# Patient Record
Sex: MALE | Race: OTHER | NOT HISPANIC OR LATINO | Employment: OTHER | URBAN - METROPOLITAN AREA
[De-identification: names, ages, dates, MRNs, and addresses within clinical notes are randomized per-mention and may not be internally consistent; named-entity substitution may affect disease eponyms.]

---

## 2021-03-24 ENCOUNTER — IMMUNIZATION (OUTPATIENT)
Dept: INTERNAL MEDICINE | Facility: CLINIC | Age: 61
End: 2021-03-24
Payer: COMMERCIAL

## 2021-03-24 DIAGNOSIS — Z23 NEED FOR VACCINATION: Primary | ICD-10-CM

## 2021-03-24 PROCEDURE — 0011A COVID-19, MRNA, LNP-S, PF, 100 MCG/0.5 ML DOSE VACCINE: CPT | Mod: PBBFAC | Performed by: INTERNAL MEDICINE

## 2021-04-21 ENCOUNTER — IMMUNIZATION (OUTPATIENT)
Dept: INTERNAL MEDICINE | Facility: CLINIC | Age: 61
End: 2021-04-21
Payer: COMMERCIAL

## 2021-04-21 DIAGNOSIS — Z23 NEED FOR VACCINATION: Primary | ICD-10-CM

## 2021-04-21 PROCEDURE — 0012A COVID-19, MRNA, LNP-S, PF, 100 MCG/0.5 ML DOSE VACCINE: CPT | Mod: PBBFAC | Performed by: INTERNAL MEDICINE

## 2021-08-26 ENCOUNTER — HOSPITAL ENCOUNTER (OUTPATIENT)
Dept: RADIOLOGY | Facility: HOSPITAL | Age: 61
Discharge: HOME | End: 2021-08-26
Attending: STUDENT IN AN ORGANIZED HEALTH CARE EDUCATION/TRAINING PROGRAM
Payer: COMMERCIAL

## 2021-08-26 ENCOUNTER — OFFICE VISIT (OUTPATIENT)
Dept: UROLOGY | Facility: CLINIC | Age: 61
End: 2021-08-26
Payer: COMMERCIAL

## 2021-08-26 VITALS
HEART RATE: 54 BPM | DIASTOLIC BLOOD PRESSURE: 82 MMHG | WEIGHT: 176.69 LBS | BODY MASS INDEX: 24.74 KG/M2 | HEIGHT: 71 IN | SYSTOLIC BLOOD PRESSURE: 134 MMHG

## 2021-08-26 DIAGNOSIS — N13.30 HYDRONEPHROSIS, UNSPECIFIED HYDRONEPHROSIS TYPE: ICD-10-CM

## 2021-08-26 DIAGNOSIS — N20.0 NEPHROLITHIASIS: ICD-10-CM

## 2021-08-26 DIAGNOSIS — N20.0 NEPHROLITHIASIS: Primary | ICD-10-CM

## 2021-08-26 LAB
BILIRUB SERPL-MCNC: ABNORMAL MG/DL
BLOOD URINE, POC: 250
CLARITY, POC UA: CLEAR
COLOR, POC UA: YELLOW
GLUCOSE UR QL STRIP: ABNORMAL
KETONES UR QL STRIP: ABNORMAL
LEUKOCYTE ESTERASE URINE, POC: ABNORMAL
NITRITE, POC UA: ABNORMAL
PH, POC UA: 6
POC RESIDUAL URINE VOLUME: 55 ML (ref 0–100)
PROTEIN, POC: ABNORMAL
SPECIFIC GRAVITY, POC UA: 1.01
UROBILINOGEN, POC UA: ABNORMAL

## 2021-08-26 PROCEDURE — 3075F PR MOST RECENT SYSTOLIC BLOOD PRESS GE 130-139MM HG: ICD-10-PCS | Mod: CPTII,S$GLB,, | Performed by: STUDENT IN AN ORGANIZED HEALTH CARE EDUCATION/TRAINING PROGRAM

## 2021-08-26 PROCEDURE — 1159F MED LIST DOCD IN RCRD: CPT | Mod: CPTII,S$GLB,, | Performed by: STUDENT IN AN ORGANIZED HEALTH CARE EDUCATION/TRAINING PROGRAM

## 2021-08-26 PROCEDURE — 87086 URINE CULTURE/COLONY COUNT: CPT | Performed by: STUDENT IN AN ORGANIZED HEALTH CARE EDUCATION/TRAINING PROGRAM

## 2021-08-26 PROCEDURE — 51798 US URINE CAPACITY MEASURE: CPT | Mod: S$GLB,,, | Performed by: STUDENT IN AN ORGANIZED HEALTH CARE EDUCATION/TRAINING PROGRAM

## 2021-08-26 PROCEDURE — 74018 RADEX ABDOMEN 1 VIEW: CPT | Mod: TC,FY

## 2021-08-26 PROCEDURE — 1160F RVW MEDS BY RX/DR IN RCRD: CPT | Mod: CPTII,S$GLB,, | Performed by: STUDENT IN AN ORGANIZED HEALTH CARE EDUCATION/TRAINING PROGRAM

## 2021-08-26 PROCEDURE — 99999 PR PBB SHADOW E&M-EST. PATIENT-LVL III: ICD-10-PCS | Mod: PBBFAC,,, | Performed by: STUDENT IN AN ORGANIZED HEALTH CARE EDUCATION/TRAINING PROGRAM

## 2021-08-26 PROCEDURE — 1126F AMNT PAIN NOTED NONE PRSNT: CPT | Mod: CPTII,S$GLB,, | Performed by: STUDENT IN AN ORGANIZED HEALTH CARE EDUCATION/TRAINING PROGRAM

## 2021-08-26 PROCEDURE — 51798 POCT BLADDER SCAN: ICD-10-PCS | Mod: S$GLB,,, | Performed by: STUDENT IN AN ORGANIZED HEALTH CARE EDUCATION/TRAINING PROGRAM

## 2021-08-26 PROCEDURE — 81002 URINALYSIS NONAUTO W/O SCOPE: CPT | Mod: S$GLB,,, | Performed by: STUDENT IN AN ORGANIZED HEALTH CARE EDUCATION/TRAINING PROGRAM

## 2021-08-26 PROCEDURE — 3075F SYST BP GE 130 - 139MM HG: CPT | Mod: CPTII,S$GLB,, | Performed by: STUDENT IN AN ORGANIZED HEALTH CARE EDUCATION/TRAINING PROGRAM

## 2021-08-26 PROCEDURE — 99999 PR PBB SHADOW E&M-EST. PATIENT-LVL III: CPT | Mod: PBBFAC,,, | Performed by: STUDENT IN AN ORGANIZED HEALTH CARE EDUCATION/TRAINING PROGRAM

## 2021-08-26 PROCEDURE — 74018 RADEX ABDOMEN 1 VIEW: CPT | Mod: 26,,, | Performed by: RADIOLOGY

## 2021-08-26 PROCEDURE — 1159F PR MEDICATION LIST DOCUMENTED IN MEDICAL RECORD: ICD-10-PCS | Mod: CPTII,S$GLB,, | Performed by: STUDENT IN AN ORGANIZED HEALTH CARE EDUCATION/TRAINING PROGRAM

## 2021-08-26 PROCEDURE — 99204 PR OFFICE/OUTPT VISIT, NEW, LEVL IV, 45-59 MIN: ICD-10-PCS | Mod: S$GLB,,, | Performed by: STUDENT IN AN ORGANIZED HEALTH CARE EDUCATION/TRAINING PROGRAM

## 2021-08-26 PROCEDURE — 74018 XR ABDOMEN AP 1 VIEW: ICD-10-PCS | Mod: 26,,, | Performed by: RADIOLOGY

## 2021-08-26 PROCEDURE — 81002 POCT URINE DIPSTICK WITHOUT MICROSCOPE: ICD-10-PCS | Mod: S$GLB,,, | Performed by: STUDENT IN AN ORGANIZED HEALTH CARE EDUCATION/TRAINING PROGRAM

## 2021-08-26 PROCEDURE — 3079F DIAST BP 80-89 MM HG: CPT | Mod: CPTII,S$GLB,, | Performed by: STUDENT IN AN ORGANIZED HEALTH CARE EDUCATION/TRAINING PROGRAM

## 2021-08-26 PROCEDURE — 99204 OFFICE O/P NEW MOD 45 MIN: CPT | Mod: S$GLB,,, | Performed by: STUDENT IN AN ORGANIZED HEALTH CARE EDUCATION/TRAINING PROGRAM

## 2021-08-26 PROCEDURE — 1126F PR PAIN SEVERITY QUANTIFIED, NO PAIN PRESENT: ICD-10-PCS | Mod: CPTII,S$GLB,, | Performed by: STUDENT IN AN ORGANIZED HEALTH CARE EDUCATION/TRAINING PROGRAM

## 2021-08-26 PROCEDURE — 3079F PR MOST RECENT DIASTOLIC BLOOD PRESSURE 80-89 MM HG: ICD-10-PCS | Mod: CPTII,S$GLB,, | Performed by: STUDENT IN AN ORGANIZED HEALTH CARE EDUCATION/TRAINING PROGRAM

## 2021-08-26 PROCEDURE — 1160F PR REVIEW ALL MEDS BY PRESCRIBER/CLIN PHARMACIST DOCUMENTED: ICD-10-PCS | Mod: CPTII,S$GLB,, | Performed by: STUDENT IN AN ORGANIZED HEALTH CARE EDUCATION/TRAINING PROGRAM

## 2021-08-26 RX ORDER — METFORMIN HYDROCHLORIDE 500 MG/1
500 TABLET ORAL 2 TIMES DAILY
COMMUNITY
Start: 2021-07-23 | End: 2023-12-20 | Stop reason: SDUPTHER

## 2021-08-26 RX ORDER — LEVOTHYROXINE SODIUM 150 UG/1
150 TABLET ORAL DAILY
COMMUNITY
Start: 2021-07-27 | End: 2023-12-20 | Stop reason: SDUPTHER

## 2021-08-26 RX ORDER — ATORVASTATIN CALCIUM 20 MG/1
20 TABLET, FILM COATED ORAL NIGHTLY
COMMUNITY
Start: 2021-07-23 | End: 2023-12-20 | Stop reason: SDUPTHER

## 2021-08-26 RX ORDER — LOSARTAN POTASSIUM 25 MG/1
25 TABLET ORAL NIGHTLY
COMMUNITY
Start: 2021-07-23 | End: 2023-12-20 | Stop reason: SDUPTHER

## 2021-08-26 RX ORDER — TAMSULOSIN HYDROCHLORIDE 0.4 MG/1
0.4 CAPSULE ORAL DAILY
Qty: 30 CAPSULE | Refills: 0 | Status: SHIPPED | OUTPATIENT
Start: 2021-08-26 | End: 2021-09-24

## 2021-08-27 LAB — BACTERIA UR CULT: NO GROWTH

## 2021-09-20 ENCOUNTER — TELEPHONE (OUTPATIENT)
Dept: ADMINISTRATIVE | Facility: OTHER | Age: 61
End: 2021-09-20

## 2021-12-29 ENCOUNTER — IMMUNIZATION (OUTPATIENT)
Dept: PHARMACY | Facility: CLINIC | Age: 61
End: 2021-12-29

## 2021-12-29 DIAGNOSIS — Z23 NEED FOR VACCINATION: Primary | ICD-10-CM

## 2022-06-22 ENCOUNTER — IMMUNIZATION (OUTPATIENT)
Dept: PHARMACY | Facility: CLINIC | Age: 62
End: 2022-06-22
Payer: COMMERCIAL

## 2022-06-22 DIAGNOSIS — Z23 NEED FOR VACCINATION: Primary | ICD-10-CM

## 2023-08-28 ENCOUNTER — OFFICE VISIT (OUTPATIENT)
Dept: UROLOGY | Facility: CLINIC | Age: 63
End: 2023-08-28
Payer: COMMERCIAL

## 2023-08-28 VITALS
HEIGHT: 71 IN | DIASTOLIC BLOOD PRESSURE: 71 MMHG | BODY MASS INDEX: 23.97 KG/M2 | WEIGHT: 171.19 LBS | HEART RATE: 55 BPM | SYSTOLIC BLOOD PRESSURE: 122 MMHG

## 2023-08-28 DIAGNOSIS — Z12.5 PROSTATE CANCER SCREENING: Primary | ICD-10-CM

## 2023-08-28 DIAGNOSIS — N40.1 BPH WITH OBSTRUCTION/LOWER URINARY TRACT SYMPTOMS: ICD-10-CM

## 2023-08-28 DIAGNOSIS — N13.8 BPH WITH OBSTRUCTION/LOWER URINARY TRACT SYMPTOMS: ICD-10-CM

## 2023-08-28 DIAGNOSIS — N41.9 INFLAMMATORY DISEASE OF PROSTATE, UNSPECIFIED: ICD-10-CM

## 2023-08-28 PROCEDURE — 99214 PR OFFICE/OUTPT VISIT, EST, LEVL IV, 30-39 MIN: ICD-10-PCS | Mod: S$GLB,,, | Performed by: UROLOGY

## 2023-08-28 PROCEDURE — 99999 PR PBB SHADOW E&M-EST. PATIENT-LVL III: CPT | Mod: PBBFAC,,, | Performed by: UROLOGY

## 2023-08-28 PROCEDURE — 1160F RVW MEDS BY RX/DR IN RCRD: CPT | Mod: CPTII,S$GLB,, | Performed by: UROLOGY

## 2023-08-28 PROCEDURE — 99214 OFFICE O/P EST MOD 30 MIN: CPT | Mod: S$GLB,,, | Performed by: UROLOGY

## 2023-08-28 PROCEDURE — 3074F PR MOST RECENT SYSTOLIC BLOOD PRESSURE < 130 MM HG: ICD-10-PCS | Mod: CPTII,S$GLB,, | Performed by: UROLOGY

## 2023-08-28 PROCEDURE — 3074F SYST BP LT 130 MM HG: CPT | Mod: CPTII,S$GLB,, | Performed by: UROLOGY

## 2023-08-28 PROCEDURE — 1159F MED LIST DOCD IN RCRD: CPT | Mod: CPTII,S$GLB,, | Performed by: UROLOGY

## 2023-08-28 PROCEDURE — 99999 PR PBB SHADOW E&M-EST. PATIENT-LVL III: ICD-10-PCS | Mod: PBBFAC,,, | Performed by: UROLOGY

## 2023-08-28 PROCEDURE — 4010F ACE/ARB THERAPY RXD/TAKEN: CPT | Mod: CPTII,S$GLB,, | Performed by: UROLOGY

## 2023-08-28 PROCEDURE — 1159F PR MEDICATION LIST DOCUMENTED IN MEDICAL RECORD: ICD-10-PCS | Mod: CPTII,S$GLB,, | Performed by: UROLOGY

## 2023-08-28 PROCEDURE — 3078F DIAST BP <80 MM HG: CPT | Mod: CPTII,S$GLB,, | Performed by: UROLOGY

## 2023-08-28 PROCEDURE — 4010F PR ACE/ARB THEARPY RXD/TAKEN: ICD-10-PCS | Mod: CPTII,S$GLB,, | Performed by: UROLOGY

## 2023-08-28 PROCEDURE — 3008F BODY MASS INDEX DOCD: CPT | Mod: CPTII,S$GLB,, | Performed by: UROLOGY

## 2023-08-28 PROCEDURE — 1160F PR REVIEW ALL MEDS BY PRESCRIBER/CLIN PHARMACIST DOCUMENTED: ICD-10-PCS | Mod: CPTII,S$GLB,, | Performed by: UROLOGY

## 2023-08-28 PROCEDURE — 3008F PR BODY MASS INDEX (BMI) DOCUMENTED: ICD-10-PCS | Mod: CPTII,S$GLB,, | Performed by: UROLOGY

## 2023-08-28 PROCEDURE — 3078F PR MOST RECENT DIASTOLIC BLOOD PRESSURE < 80 MM HG: ICD-10-PCS | Mod: CPTII,S$GLB,, | Performed by: UROLOGY

## 2023-08-28 RX ORDER — FINASTERIDE 5 MG/1
5 TABLET, FILM COATED ORAL DAILY
Qty: 90 TABLET | Refills: 3 | Status: SHIPPED | OUTPATIENT
Start: 2023-08-28 | End: 2023-12-20 | Stop reason: SDUPTHER

## 2023-08-28 NOTE — PROGRESS NOTES
[facility] - Urology   Clinic Note    SUBJECTIVE:     Chief Complaint   Patient presents with    Other     Prostate issues        Referral from: No ref. provider found.    History of Present Illness:  Waldemar Marquez is a 62 y.o. male who presents to clinic for lower urinary tract symptoms and BPH.    Patient with bothersome LUTS, including urgency, weak stream, nocturia x 3 times per night. Denies any UTIs. No hematuria. No PSA on file. Has been on tamsulosin with little relief.    Previous medications for BPH include: yes: tamsulosin  AUA Symptom Score: pt unable to fill out, speaks cantonese  Previous prostatic surgery: No    Patient endorses no additional complaints at this time.    History reviewed. No pertinent past medical history.    History reviewed. No pertinent surgical history.    History reviewed. No pertinent family history.    Social History     Tobacco Use    Smoking status: Every Day     Current packs/day: 0.25     Average packs/day: 0.3 packs/day for 40.0 years (10.0 ttl pk-yrs)     Types: Cigarettes    Smokeless tobacco: Never       Current Outpatient Medications on File Prior to Visit   Medication Sig Dispense Refill    atorvastatin (LIPITOR) 20 MG tablet Take 20 mg by mouth nightly.      levothyroxine (SYNTHROID) 150 MCG tablet Take 150 mcg by mouth once daily.      losartan (COZAAR) 25 MG tablet Take 25 mg by mouth nightly.      metFORMIN (GLUCOPHAGE) 500 MG tablet Take 500 mg by mouth 2 (two) times daily.      tamsulosin (FLOMAX) 0.4 mg Cap Take 1 capsule by mouth once daily 30 capsule 0     No current facility-administered medications on file prior to visit.       Review of patient's allergies indicates:  No Known Allergies    Review of Systems:  A review of 10+ systems was conducted with pertinent positive and negative findings documented in HPI with all other systems reviewed and negative.    OBJECTIVE:     Estimated body mass index is 23.88 kg/m² as calculated from the following:    Height as of  "this encounter: 5' 11" (1.803 m).    Weight as of this encounter: 77.7 kg (171 lb 3 oz).    Vital Signs (Most Recent)  Vitals:    08/28/23 0922   BP: 122/71   Pulse: (!) 55       Physical Exam:  GENERAL: patient sitting comfortably  HEENT: normocephalic  NECK: supple, no JVD  PULM: normal chest rise, no increased WOB  HEART: non-diaphoretic  ABDO: soft, nondistended, nontender  BACK: no CVA tenderness bilaterally  SKIN: warm, dry, well perfused  EXT: no bruising or edema  NEURO: grossly normal with no focal deficits  PSYCH: appropriate mood and affect    Genitourinary Exam:  deferred    Lab Results   Component Value Date    BUN 14 08/26/2021    CREATININE 0.8 08/26/2021    WBC 6.69 08/26/2021    HGB 14.1 08/26/2021    HCT 41.7 08/26/2021     08/26/2021    AST 16 12/16/2014    ALT 17 12/16/2014    ALKPHOS 50 (L) 12/16/2014    ALBUMIN 3.5 12/16/2014        Imaging:  I have personally reviewed all relevant imaging studies.    No results found for this or any previous visit (from the past 2160 hour(s)).  No results found for this or any previous visit (from the past 2160 hour(s)).  X-Ray Abdomen AP 1 View  Narrative: EXAMINATION:  XR ABDOMEN AP 1 VIEW    CLINICAL HISTORY:  Calculus of kidney    TECHNIQUE:  Single AP supine view of the abdomen (KUB) was performed    COMPARISON:  None    FINDINGS:  Bones unremarkable    6 mm calcification overlies the right renal shadow.  Suspected cloudlike large focus of calcification measuring 20 x 13 mm overlies the expected location of the left renal pelvis and a cluster of calcifications or irregular larger calcification which measures 12 mm when measured together overlies the lower pole of the left renal shadow.  Rounded pelvic calcifications are seen bilaterally compatible with phleboliths.    Bowel gas pattern nonobstructive with formed stool seen throughout the colon, possibly obscuring smaller genitourinary tract stones.  Impression: Bilateral nephrolithiasis as " "discussed above    Electronically signed by: Pawan Adames Jr  Date:    08/26/2021  Time:    10:42       PSA:  Lab Results   Component Value Date    PSA 1.2 12/16/2014       Testosterone:  No results found for: "TOTALTESTOST", "TESTOSTERONE"     ASSESSMENT     1. Prostate cancer screening    2. BPH with obstruction/lower urinary tract symptoms        PLAN:     BPH with LUTS    - We discussed the association of lower urinary tract symptoms (LUTS) and prostate enlargement (BPH). The management of BPH varies widely depending on the degree of bother, adverse events related to BPH, and the overall size of the prostate.     - We discussed behavioral modifications and medical management with alpha-blockers or 5 alpha-reductase inhibitors. We reviewed the surgical options available including Rezum, Urolift, TURP, HoLEP, and simple prostatectomy with both robotic and open approaches. We reviewed the indications for each as well as the risks, benefits, and re-treatment rates associated with each procedure.     - Will continue tamsulosin 0.4 mg daily and add finasteride 5mg daily    Prostate Cancer Screening    - PSA today    Reinaldo Ervin MD  Urology  [facility]  [facility]    Disclaimer: This note has been generated using voice-recognition software. There may be typographical errors that have been missed during proof-reading.     "

## 2023-11-27 ENCOUNTER — LAB VISIT (OUTPATIENT)
Dept: LAB | Facility: HOSPITAL | Age: 63
End: 2023-11-27
Attending: UROLOGY
Payer: MEDICARE

## 2023-11-27 DIAGNOSIS — Z12.5 PROSTATE CANCER SCREENING: ICD-10-CM

## 2023-11-27 LAB — COMPLEXED PSA SERPL-MCNC: 13 NG/ML (ref 0–4)

## 2023-11-27 PROCEDURE — 36415 COLL VENOUS BLD VENIPUNCTURE: CPT | Performed by: UROLOGY

## 2023-11-27 PROCEDURE — 84153 ASSAY OF PSA TOTAL: CPT | Performed by: UROLOGY

## 2023-12-04 ENCOUNTER — LAB VISIT (OUTPATIENT)
Dept: LAB | Facility: HOSPITAL | Age: 63
End: 2023-12-04
Attending: UROLOGY
Payer: MEDICARE

## 2023-12-04 ENCOUNTER — OFFICE VISIT (OUTPATIENT)
Dept: UROLOGY | Facility: CLINIC | Age: 63
End: 2023-12-04
Payer: MEDICARE

## 2023-12-04 VITALS
WEIGHT: 177.25 LBS | BODY MASS INDEX: 24.81 KG/M2 | HEART RATE: 64 BPM | HEIGHT: 71 IN | SYSTOLIC BLOOD PRESSURE: 102 MMHG | DIASTOLIC BLOOD PRESSURE: 68 MMHG

## 2023-12-04 DIAGNOSIS — N40.1 BPH WITH OBSTRUCTION/LOWER URINARY TRACT SYMPTOMS: ICD-10-CM

## 2023-12-04 DIAGNOSIS — Z12.5 PROSTATE CANCER SCREENING: Primary | ICD-10-CM

## 2023-12-04 DIAGNOSIS — N13.8 BPH WITH OBSTRUCTION/LOWER URINARY TRACT SYMPTOMS: ICD-10-CM

## 2023-12-04 DIAGNOSIS — R97.20 ELEVATED PSA: ICD-10-CM

## 2023-12-04 DIAGNOSIS — Z12.5 PROSTATE CANCER SCREENING: ICD-10-CM

## 2023-12-04 LAB
PROSTATE SPECIFIC ANTIGEN, TOTAL: 15.4 NG/ML (ref 0–4)
PSA FREE MFR SERPL: 11.3 %
PSA FREE SERPL-MCNC: 1.74 NG/ML (ref 0–1.5)

## 2023-12-04 PROCEDURE — 99214 OFFICE O/P EST MOD 30 MIN: CPT | Mod: S$PBB,,, | Performed by: UROLOGY

## 2023-12-04 PROCEDURE — 99214 PR OFFICE/OUTPT VISIT, EST, LEVL IV, 30-39 MIN: ICD-10-PCS | Mod: S$PBB,,, | Performed by: UROLOGY

## 2023-12-04 PROCEDURE — 84154 ASSAY OF PSA FREE: CPT | Performed by: UROLOGY

## 2023-12-04 PROCEDURE — 36415 COLL VENOUS BLD VENIPUNCTURE: CPT | Performed by: UROLOGY

## 2023-12-04 PROCEDURE — 99999 PR PBB SHADOW E&M-EST. PATIENT-LVL IV: CPT | Mod: PBBFAC,,, | Performed by: UROLOGY

## 2023-12-04 PROCEDURE — 99999 PR PBB SHADOW E&M-EST. PATIENT-LVL IV: ICD-10-PCS | Mod: PBBFAC,,, | Performed by: UROLOGY

## 2023-12-04 PROCEDURE — 99214 OFFICE O/P EST MOD 30 MIN: CPT | Mod: PBBFAC,PO | Performed by: UROLOGY

## 2023-12-04 NOTE — PROGRESS NOTES
[facility] - Urology   Clinic Note    SUBJECTIVE:     Chief Complaint   Patient presents with    Other     3 month follow up  Discuss results        Referral from: No ref. provider found.    History of Present Illness:  Waldemar Marquez is a 63 y.o. male who presents to clinic for lower urinary tract symptoms and BPH.    Patient with bothersome LUTS, including urgency, weak stream, nocturia x 3 times per night. Denies any UTIs. No hematuria. No PSA on file. Has been on tamsulosin with little relief.    Previous medications for BPH include: yes: tamsulosin  AUA Symptom Score: pt unable to fill out, speaks cantonese  Previous prostatic surgery: No    12/04/2023  Here with elevated PSA. PSA is 26 corrected for finasteride.    PSA:  Lab Results   Component Value Date    PSA 1.2 12/16/2014    PSADIAG 13.0 (H) 11/27/2023       Patient endorses no additional complaints at this time.    History reviewed. No pertinent past medical history.    History reviewed. No pertinent surgical history.    History reviewed. No pertinent family history.    Social History     Tobacco Use    Smoking status: Every Day     Current packs/day: 0.25     Average packs/day: 0.3 packs/day for 40.0 years (10.0 ttl pk-yrs)     Types: Cigarettes    Smokeless tobacco: Never       Current Outpatient Medications on File Prior to Visit   Medication Sig Dispense Refill    atorvastatin (LIPITOR) 20 MG tablet Take 20 mg by mouth nightly.      COVID [id number],12up,,andu,,PF, (SPIKEVAX [id number],12Y UP,,PF,) 50 mcg/0.5 mL injection Inject into the muscle. 0.5 mL 0    finasteride (PROSCAR) 5 mg tablet Take 1 tablet (5 mg total) by mouth once daily. 90 tablet 3    levothyroxine (SYNTHROID) 150 MCG tablet Take 150 mcg by mouth once daily.      losartan (COZAAR) 25 MG tablet Take 25 mg by mouth nightly.      metFORMIN (GLUCOPHAGE) 500 MG tablet Take 500 mg by mouth 2 (two) times daily.      tamsulosin (FLOMAX) 0.4 mg Cap Take 1 capsule by mouth once daily 30 capsule 0  "    No current facility-administered medications on file prior to visit.       Review of patient's allergies indicates:  No Known Allergies    Review of Systems:  A review of 10+ systems was conducted with pertinent positive and negative findings documented in HPI with all other systems reviewed and negative.    OBJECTIVE:     Estimated body mass index is 24.72 kg/m² as calculated from the following:    Height as of this encounter: 5' 11" (1.803 m).    Weight as of this encounter: 80.4 kg (177 lb 4 oz).    Vital Signs (Most Recent)  Vitals:    12/04/23 0940   BP: 102/68   Pulse: 64       Physical Exam:  GENERAL: patient sitting comfortably  HEENT: normocephalic  NECK: supple, no JVD  PULM: normal chest rise, no increased WOB  HEART: non-diaphoretic  ABDO: soft, nondistended, nontender  BACK: no CVA tenderness bilaterally  SKIN: warm, dry, well perfused  EXT: no bruising or edema  NEURO: grossly normal with no focal deficits  PSYCH: appropriate mood and affect    Genitourinary Exam:  deferred    Lab Results   Component Value Date    BUN 14 08/26/2021    CREATININE 0.8 08/26/2021    WBC 6.69 08/26/2021    HGB 14.1 08/26/2021    HCT 41.7 08/26/2021     08/26/2021    AST 16 12/16/2014    ALT 17 12/16/2014    ALKPHOS 50 (L) 12/16/2014    ALBUMIN 3.5 12/16/2014        Imaging:  I have personally reviewed all relevant imaging studies.    No results found for this or any previous visit (from the past 2160 hour(s)).  No results found for this or any previous visit (from the past 2160 hour(s)).  X-Ray Abdomen AP 1 View  Narrative: EXAMINATION:  XR ABDOMEN AP 1 VIEW    CLINICAL HISTORY:  Calculus of kidney    TECHNIQUE:  Single AP supine view of the abdomen (KUB) was performed    COMPARISON:  None    FINDINGS:  Bones unremarkable    6 mm calcification overlies the right renal shadow.  Suspected cloudlike large focus of calcification measuring 20 x 13 mm overlies the expected location of the left renal pelvis and a " "cluster of calcifications or irregular larger calcification which measures 12 mm when measured together overlies the lower pole of the left renal shadow.  Rounded pelvic calcifications are seen bilaterally compatible with phleboliths.    Bowel gas pattern nonobstructive with formed stool seen throughout the colon, possibly obscuring smaller genitourinary tract stones.  Impression: Bilateral nephrolithiasis as discussed above    Electronically signed by: Pawan Adames Jr  Date:    08/26/2021  Time:    10:42       PSA:  Lab Results   Component Value Date    PSA 1.2 12/16/2014    PSADIAG 13.0 (H) 11/27/2023       Testosterone:  No results found for: "TOTALTESTOST", "TESTOSTERONE"     ASSESSMENT     1. Prostate cancer screening    2. BPH with obstruction/lower urinary tract symptoms    3. Elevated PSA        PLAN:     PSA values were discussed. PSA is a protein made by the prostate in both benign and malignant conditions. I discussed with the finding of an abnormal PSA test or rising PSA level. We discussed about the benefits and limitations of PSA testing/screening.    - We discussed the most common differential diagnosis of an elevated PSA including BPH, prostatitis (chronic and acute), and prostate cancer. Other common benign reasons for elevated PSA include infection, recent instrumentation, ejaculation, and trauma.     -  We also discussed next steps, including repeating the PSA, proceeding to prostate needle biopsy, as well as the utility of a multi-parametric prostate MRI.    - After our discussion, we decided to proceed with repeat PSA, and MRI of the prostate ASAP.  Patient is going to [place] for 3 months and would like to follow up after that.  We discussed the risks and benefits of that and will touch base to arrange follow up once the MRI is completed.    Reinaldo Ervin MD  Urology  [facility]    Disclaimer: This note has been generated using voice-recognition software. There may " be typographical errors that have been missed during proof-reading.

## 2023-12-11 ENCOUNTER — HOSPITAL ENCOUNTER (OUTPATIENT)
Dept: RADIOLOGY | Facility: HOSPITAL | Age: 63
Discharge: HOME | End: 2023-12-11
Attending: UROLOGY
Payer: MEDICARE

## 2023-12-11 DIAGNOSIS — Z12.5 PROSTATE CANCER SCREENING: Primary | ICD-10-CM

## 2023-12-11 DIAGNOSIS — R97.20 ELEVATED PSA: ICD-10-CM

## 2023-12-11 PROCEDURE — 25500020 PHARM REV CODE 255: Performed by: UROLOGY

## 2023-12-11 PROCEDURE — 72197 MRI PROSTATE W W/O CONTRAST: ICD-10-PCS | Mod: 26,,, | Performed by: RADIOLOGY

## 2023-12-11 PROCEDURE — 72197 MRI PELVIS W/O & W/DYE: CPT | Mod: 26,,, | Performed by: RADIOLOGY

## 2023-12-11 PROCEDURE — A9585 GADOBUTROL INJECTION: HCPCS | Performed by: UROLOGY

## 2023-12-11 PROCEDURE — 72197 MRI PELVIS W/O & W/DYE: CPT | Mod: TC

## 2023-12-11 RX ORDER — GADOBUTROL 604.72 MG/ML
10 INJECTION INTRAVENOUS
Status: COMPLETED | OUTPATIENT
Start: 2023-12-11 | End: 2023-12-11

## 2023-12-11 RX ADMIN — GADOBUTROL 10 ML: 604.72 INJECTION INTRAVENOUS at 03:12

## 2023-12-18 ENCOUNTER — OFFICE VISIT (OUTPATIENT)
Dept: UROLOGY | Facility: CLINIC | Age: 63
End: 2023-12-18
Payer: MEDICARE

## 2023-12-18 VITALS
DIASTOLIC BLOOD PRESSURE: 83 MMHG | BODY MASS INDEX: 25.83 KG/M2 | HEIGHT: 71 IN | SYSTOLIC BLOOD PRESSURE: 140 MMHG | HEART RATE: 69 BPM | WEIGHT: 184.5 LBS

## 2023-12-18 DIAGNOSIS — R97.20 ELEVATED PSA: ICD-10-CM

## 2023-12-18 DIAGNOSIS — Z12.5 PROSTATE CANCER SCREENING: Primary | ICD-10-CM

## 2023-12-18 PROCEDURE — 99213 OFFICE O/P EST LOW 20 MIN: CPT | Mod: PBBFAC,PO | Performed by: UROLOGY

## 2023-12-18 PROCEDURE — 99999 PR PBB SHADOW E&M-EST. PATIENT-LVL III: ICD-10-PCS | Mod: PBBFAC,,, | Performed by: UROLOGY

## 2023-12-18 PROCEDURE — 99215 PR OFFICE/OUTPT VISIT, EST, LEVL V, 40-54 MIN: ICD-10-PCS | Mod: S$PBB,,, | Performed by: UROLOGY

## 2023-12-18 PROCEDURE — 99999 PR PBB SHADOW E&M-EST. PATIENT-LVL III: CPT | Mod: PBBFAC,,, | Performed by: UROLOGY

## 2023-12-18 PROCEDURE — 99215 OFFICE O/P EST HI 40 MIN: CPT | Mod: S$PBB,,, | Performed by: UROLOGY

## 2023-12-18 RX ORDER — CIPROFLOXACIN 500 MG/1
500 TABLET ORAL 2 TIMES DAILY
Qty: 2 TABLET | Refills: 0 | Status: SHIPPED | OUTPATIENT
Start: 2023-12-18 | End: 2023-12-19

## 2023-12-18 NOTE — PROGRESS NOTES
[facility] - Urology   Clinic Note    SUBJECTIVE:     Chief Complaint   Patient presents with    Other     MRI Results        Referral from: No ref. provider found.    History of Present Illness:  Waldemar Marquez is a 63 y.o. male who presents to clinic for lower urinary tract symptoms and BPH.    Patient with bothersome LUTS, including urgency, weak stream, nocturia x 3 times per night. Denies any UTIs. No hematuria. No PSA on file. Has been on tamsulosin with little relief.    Previous medications for BPH include: yes: tamsulosin  AUA Symptom Score: pt unable to fill out, speaks cantonese  Previous prostatic surgery: No    12/04/2023  Here with elevated PSA. PSA is 26 corrected for finasteride.    PSA:  Lab Results   Component Value Date    PSA 1.2 12/16/2014    PSADIAG 13.0 (H) 11/27/2023    PSATOTAL 15.4 (H) 12/04/2023    PSAFREE 1.74 (H) 12/04/2023 12/18/2023  Here to review the results of his prostate MRI.    MRI Prostate:  Prostate Volume: 50 cc  PSA: 30.8  PSA Density: 0.61  Overall Assessment: PI-RADS 5, 1 targets (entire prostate)  Apparent extension into the bladder    IPSS: 25/5    Patient endorses no additional complaints at this time.    History reviewed. No pertinent past medical history.    History reviewed. No pertinent surgical history.    History reviewed. No pertinent family history.    Social History     Tobacco Use    Smoking status: Every Day     Current packs/day: 0.25     Average packs/day: 0.3 packs/day for 40.0 years (10.0 ttl pk-yrs)     Types: Cigarettes    Smokeless tobacco: Never       Current Outpatient Medications on File Prior to Visit   Medication Sig Dispense Refill    atorvastatin (LIPITOR) 20 MG tablet Take 20 mg by mouth nightly.      COVID [id number],12up,,andu,,PF, (SPIKEVAX [id number],12Y UP,,PF,) 50 mcg/0.5 mL injection Inject into the muscle. 0.5 mL 0    finasteride (PROSCAR) 5 mg tablet Take 1 tablet (5 mg total) by mouth once daily. 90 tablet 3    levothyroxine (SYNTHROID) 150  "MCG tablet Take 150 mcg by mouth once daily.      losartan (COZAAR) 25 MG tablet Take 25 mg by mouth nightly.      metFORMIN (GLUCOPHAGE) 500 MG tablet Take 500 mg by mouth 2 (two) times daily.      tamsulosin (FLOMAX) 0.4 mg Cap Take 1 capsule by mouth once daily 30 capsule 0     No current facility-administered medications on file prior to visit.       Review of patient's allergies indicates:  No Known Allergies    Review of Systems:  A review of 10+ systems was conducted with pertinent positive and negative findings documented in HPI with all other systems reviewed and negative.    OBJECTIVE:     Estimated body mass index is 25.74 kg/m² as calculated from the following:    Height as of this encounter: 5' 11" (1.803 m).    Weight as of this encounter: 83.7 kg (184 lb 8.4 oz).    Vital Signs (Most Recent)  Vitals:    12/18/23 0904   BP: (!) 140/83   Pulse: 69       Physical Exam:  GENERAL: patient sitting comfortably  HEENT: normocephalic  NECK: supple, no JVD  PULM: normal chest rise, no increased WOB  HEART: non-diaphoretic  ABDO: soft, nondistended, nontender  BACK: no CVA tenderness bilaterally  SKIN: warm, dry, well perfused  EXT: no bruising or edema  NEURO: grossly normal with no focal deficits  PSYCH: appropriate mood and affect    Genitourinary Exam:  deferred    Lab Results   Component Value Date    BUN 14 08/26/2021    CREATININE 0.8 08/26/2021    WBC 6.69 08/26/2021    HGB 14.1 08/26/2021    HCT 41.7 08/26/2021     08/26/2021    AST 16 12/16/2014    ALT 17 12/16/2014    ALKPHOS 50 (L) 12/16/2014    ALBUMIN 3.5 12/16/2014        Imaging:  I have personally reviewed all relevant imaging studies.    No results found for this or any previous visit (from the past 2160 hour(s)).  Results for orders placed or performed during the hospital encounter of 12/11/23 (from the past 2160 hour(s))   MRI Prostate W W/O Contrast    Impression    Diffusely abnormal appearance of the prostate highly concerning for " malignancy (PI-RADS 5).  Suspected involvement of the bilateral seminal vesicles.    Polypoid mass in the right anterolateral bladder wall concerning for involvement of the bladder wall, though primary bladder malignancy not excluded.    Suspicious right pelvic lymph node.    Overall Assessment: PI-RADS 5 - Very high (clinically significant cancer is highly likely to be present)    Number of targets created for potential MR/US fusion biopsy = 1 (encompassing nearly the entire prostate).    Electronically signed by resident: Man Marin  Date:    12/12/2023  Time:    08:40    Electronically signed by: Austyn Bardales MD  Date:    12/12/2023  Time:    17:39     MRI Prostate W W/O Contrast  Narrative: EXAMINATION:  MRI PROSTATE W W/O CONTRAST    CLINICAL HISTORY:  Prostate cancer suspected;  Elevated prostate specific antigen (PSA)    Additional history: None provided.    TECHNIQUE:  Multiparametric MRI of the prostate/pelvis performed on a 3T scanner with phase pelvic coil. Multiplanar, multisequence images including high resolution, small field-of-view T2-WI; axial diffusion weighted images with multiple B-values and creation of ADC-maps; and dynamic contrast enhanced T1-weighted images through the prostate were obtained before, during, and after the administration of 10 cc intravenous gadolinium.    COMPARISON:  None.    FINDINGS:  Previous biopsy: None.    PSA: 15.4 ng/mL (12/04/2023), 13.0 ng/mL (11/27/2023), 1.2 ng/mL (12/16/2014)    Prior therapy: None.    Prostate: 5.1 x 4.5 x 4.7 cm corresponding to a computed volume of 50 cc.    Nearly entire prostate gland is replaced by abnormal low T2 signal with diffusion restriction, P-RADS 5.  Portions of the abdominal prostate were marked in DynaCAD to facilitate possible biopsy.    Neurovascular bundle: Normal appearance.    Seminal vesicles: Abnormal signal involving the bilateral seminal vesicles near the prostate which may represent invasion.    Adjacent Organ  "Involvement: Polypoid mass in the right inferolateral bladder wall measuring 3.8 x 5.7 x 1.8 cm, concerning for involvement of the bladder wall, though primary bladder malignancy not excluded.  No definite rectal involvement.    Lymphadenopathy: Suspicious 0.8 cm lymph node in the right pelvic fat with mild heterogeneous signal (series 6001, image 15).    Other Findings: None.  Impression: Diffusely abnormal appearance of the prostate highly concerning for malignancy (PI-RADS 5).  Suspected involvement of the bilateral seminal vesicles.    Polypoid mass in the right anterolateral bladder wall concerning for involvement of the bladder wall, though primary bladder malignancy not excluded.    Suspicious right pelvic lymph node.    Overall Assessment: PI-RADS 5 - Very high (clinically significant cancer is highly likely to be present)    Number of targets created for potential MR/US fusion biopsy = 1 (encompassing nearly the entire prostate).    Electronically signed by resident: Man Marin  Date:    12/12/2023  Time:    08:40    Electronically signed by: Austyn Bardales MD  Date:    12/12/2023  Time:    17:39       PSA:  Lab Results   Component Value Date    PSA 1.2 12/16/2014    PSADIAG 13.0 (H) 11/27/2023    PSATOTAL 15.4 (H) 12/04/2023    PSAFREE 1.74 (H) 12/04/2023       Testosterone:  No results found for: "TOTALTESTOST", "TESTOSTERONE"     ASSESSMENT     1. Prostate cancer screening    2. Elevated PSA        PLAN:     We discussed options extensively.  Patient needs a prostate needle biopsy as he likely has prostate cancer.  We will subsequently need imaging as part of a metastatic workup, likely a PSMA PET scan.  I would also perform cystoscopy at the time of biopsy to evaluate his bladder.  In the event that the patient were to have prostate cancer he would likely need ADT with radiation therapy, and due to his extension of the disease into the bladder, I would be very hesitant to consider any radical " prostatectomy in the event that he does have localized disease.  Prior to proceeding with any radiation, he would also likely need some sort of de-obstruction.  The patient was strongly considering leaving [facility] and going back to [place] for the next few months which I advised him against.    I spent a total of 60 minutes on the day of the visit. This includes face to face time and non-face to face time preparing to see the patient (eg, review of tests), obtaining and/or reviewing separately obtained history, documenting clinical information in the electronic or other health record, independently interpreting results and communicating results to the patient/family/caregiver, or care coordinator.    Reinaldo Ervin MD  Urology  [facility]  [facility]    Disclaimer: This note has been generated using voice-recognition software. There may be typographical errors that have been missed during proof-reading.

## 2023-12-19 PROBLEM — N20.0 KIDNEY STONE: Status: ACTIVE | Noted: 2023-12-19

## 2023-12-19 PROBLEM — E11.59 HYPERTENSION ASSOCIATED WITH TYPE 2 DIABETES MELLITUS: Status: ACTIVE | Noted: 2023-12-19

## 2023-12-19 PROBLEM — I15.2 HYPERTENSION ASSOCIATED WITH TYPE 2 DIABETES MELLITUS: Status: ACTIVE | Noted: 2023-12-19

## 2023-12-19 PROBLEM — R97.20 ELEVATED PSA: Status: ACTIVE | Noted: 2023-12-19

## 2023-12-19 LAB
CREAT SERPL-MCNC: 1.3 MG/DL (ref 0.5–1.4)
SAMPLE: NORMAL

## 2023-12-20 ENCOUNTER — LAB VISIT (OUTPATIENT)
Dept: LAB | Facility: HOSPITAL | Age: 63
End: 2023-12-20
Attending: INTERNAL MEDICINE
Payer: MEDICARE

## 2023-12-20 ENCOUNTER — OFFICE VISIT (OUTPATIENT)
Dept: INTERNAL MEDICINE | Facility: CLINIC | Age: 63
End: 2023-12-20
Payer: MEDICARE

## 2023-12-20 VITALS
HEART RATE: 68 BPM | DIASTOLIC BLOOD PRESSURE: 80 MMHG | SYSTOLIC BLOOD PRESSURE: 130 MMHG | OXYGEN SATURATION: 98 % | BODY MASS INDEX: 25.71 KG/M2 | WEIGHT: 183.63 LBS | HEIGHT: 71 IN

## 2023-12-20 DIAGNOSIS — N20.0 KIDNEY STONE: ICD-10-CM

## 2023-12-20 DIAGNOSIS — E55.9 VITAMIN D DEFICIENCY: ICD-10-CM

## 2023-12-20 DIAGNOSIS — Z23 NEED FOR HEPATITIS B VACCINATION: ICD-10-CM

## 2023-12-20 DIAGNOSIS — Z11.3 SCREENING FOR STD (SEXUALLY TRANSMITTED DISEASE): ICD-10-CM

## 2023-12-20 DIAGNOSIS — Z23 NEED FOR PNEUMOCOCCAL 20-VALENT CONJUGATE VACCINATION: ICD-10-CM

## 2023-12-20 DIAGNOSIS — E11.59 HYPERTENSION ASSOCIATED WITH TYPE 2 DIABETES MELLITUS: ICD-10-CM

## 2023-12-20 DIAGNOSIS — Z23 NEED FOR TDAP VACCINATION: ICD-10-CM

## 2023-12-20 DIAGNOSIS — R35.0 BENIGN PROSTATIC HYPERPLASIA WITH URINARY FREQUENCY: ICD-10-CM

## 2023-12-20 DIAGNOSIS — Z23 NEED FOR SHINGLES VACCINE: ICD-10-CM

## 2023-12-20 DIAGNOSIS — E11.9 TYPE 2 DIABETES MELLITUS WITHOUT COMPLICATION, WITHOUT LONG-TERM CURRENT USE OF INSULIN: ICD-10-CM

## 2023-12-20 DIAGNOSIS — I15.2 HYPERTENSION ASSOCIATED WITH TYPE 2 DIABETES MELLITUS: ICD-10-CM

## 2023-12-20 DIAGNOSIS — Z76.89 ESTABLISHING CARE WITH NEW DOCTOR, ENCOUNTER FOR: ICD-10-CM

## 2023-12-20 DIAGNOSIS — N40.1 BENIGN PROSTATIC HYPERPLASIA WITH URINARY FREQUENCY: ICD-10-CM

## 2023-12-20 DIAGNOSIS — E89.0 POSTABLATIVE HYPOTHYROIDISM: ICD-10-CM

## 2023-12-20 DIAGNOSIS — E78.5 DYSLIPIDEMIA: ICD-10-CM

## 2023-12-20 DIAGNOSIS — R97.20 ELEVATED PSA: ICD-10-CM

## 2023-12-20 DIAGNOSIS — Z76.89 ESTABLISHING CARE WITH NEW DOCTOR, ENCOUNTER FOR: Primary | ICD-10-CM

## 2023-12-20 LAB
25(OH)D3+25(OH)D2 SERPL-MCNC: 28 NG/ML (ref 30–96)
ALBUMIN SERPL BCP-MCNC: 3.8 G/DL (ref 3.5–5.2)
ALBUMIN/CREAT UR: 35 UG/MG (ref 0–30)
ALP SERPL-CCNC: 60 U/L (ref 55–135)
ALT SERPL W/O P-5'-P-CCNC: 14 U/L (ref 10–44)
ANION GAP SERPL CALC-SCNC: 10 MMOL/L (ref 8–16)
AST SERPL-CCNC: 17 U/L (ref 10–40)
BACTERIA #/AREA URNS AUTO: ABNORMAL /HPF
BASOPHILS # BLD AUTO: 0.05 K/UL (ref 0–0.2)
BASOPHILS NFR BLD: 0.6 % (ref 0–1.9)
BILIRUB SERPL-MCNC: 0.5 MG/DL (ref 0.1–1)
BILIRUB UR QL STRIP: NEGATIVE
BUN SERPL-MCNC: 26 MG/DL (ref 8–23)
CALCIUM SERPL-MCNC: 9.7 MG/DL (ref 8.7–10.5)
CHLORIDE SERPL-SCNC: 105 MMOL/L (ref 95–110)
CHOLEST SERPL-MCNC: 158 MG/DL (ref 120–199)
CHOLEST/HDLC SERPL: 2.6 {RATIO} (ref 2–5)
CLARITY UR REFRACT.AUTO: CLEAR
CO2 SERPL-SCNC: 21 MMOL/L (ref 23–29)
COLOR UR AUTO: COLORLESS
CREAT SERPL-MCNC: 1.2 MG/DL (ref 0.5–1.4)
CREAT UR-MCNC: 40 MG/DL (ref 23–375)
DIFFERENTIAL METHOD BLD: ABNORMAL
EOSINOPHIL # BLD AUTO: 0.4 K/UL (ref 0–0.5)
EOSINOPHIL NFR BLD: 5.4 % (ref 0–8)
ERYTHROCYTE [DISTWIDTH] IN BLOOD BY AUTOMATED COUNT: 13.3 % (ref 11.5–14.5)
EST. GFR  (NO RACE VARIABLE): >60 ML/MIN/1.73 M^2
ESTIMATED AVG GLUCOSE: 151 MG/DL (ref 68–131)
GLUCOSE SERPL-MCNC: 109 MG/DL (ref 70–110)
GLUCOSE UR QL STRIP: ABNORMAL
HBA1C MFR BLD: 6.9 % (ref 4–5.6)
HBV CORE AB SERPL QL IA: REACTIVE
HBV SURFACE AB SER-ACNC: 6.52 MIU/ML
HBV SURFACE AB SER-ACNC: NORMAL M[IU]/ML
HBV SURFACE AG SERPL QL IA: NORMAL
HCT VFR BLD AUTO: 44.5 % (ref 40–54)
HCV AB SERPL QL IA: NORMAL
HDLC SERPL-MCNC: 60 MG/DL (ref 40–75)
HDLC SERPL: 38 % (ref 20–50)
HGB BLD-MCNC: 15 G/DL (ref 14–18)
HGB UR QL STRIP: ABNORMAL
HIV 1+2 AB+HIV1 P24 AG SERPL QL IA: NORMAL
IMM GRANULOCYTES # BLD AUTO: 0.04 K/UL (ref 0–0.04)
IMM GRANULOCYTES NFR BLD AUTO: 0.5 % (ref 0–0.5)
KETONES UR QL STRIP: NEGATIVE
LDLC SERPL CALC-MCNC: 80.2 MG/DL (ref 63–159)
LEUKOCYTE ESTERASE UR QL STRIP: NEGATIVE
LYMPHOCYTES # BLD AUTO: 2.4 K/UL (ref 1–4.8)
LYMPHOCYTES NFR BLD: 29.1 % (ref 18–48)
MCH RBC QN AUTO: 31.6 PG (ref 27–31)
MCHC RBC AUTO-ENTMCNC: 33.7 G/DL (ref 32–36)
MCV RBC AUTO: 94 FL (ref 82–98)
MICROALBUMIN UR DL<=1MG/L-MCNC: 14 UG/ML
MICROSCOPIC COMMENT: ABNORMAL
MONOCYTES # BLD AUTO: 0.9 K/UL (ref 0.3–1)
MONOCYTES NFR BLD: 11.2 % (ref 4–15)
NEUTROPHILS # BLD AUTO: 4.4 K/UL (ref 1.8–7.7)
NEUTROPHILS NFR BLD: 53.2 % (ref 38–73)
NITRITE UR QL STRIP: NEGATIVE
NONHDLC SERPL-MCNC: 98 MG/DL
NRBC BLD-RTO: 0 /100 WBC
PH UR STRIP: 6 [PH] (ref 5–8)
PLATELET # BLD AUTO: 232 K/UL (ref 150–450)
PMV BLD AUTO: 10 FL (ref 9.2–12.9)
POTASSIUM SERPL-SCNC: 4.2 MMOL/L (ref 3.5–5.1)
PROT SERPL-MCNC: 8.2 G/DL (ref 6–8.4)
PROT UR QL STRIP: NEGATIVE
RBC # BLD AUTO: 4.75 M/UL (ref 4.6–6.2)
RBC #/AREA URNS AUTO: 9 /HPF (ref 0–4)
SODIUM SERPL-SCNC: 136 MMOL/L (ref 136–145)
SP GR UR STRIP: 1.01 (ref 1–1.03)
T4 FREE SERPL-MCNC: 1.18 NG/DL (ref 0.71–1.51)
TRIGL SERPL-MCNC: 89 MG/DL (ref 30–150)
TSH SERPL DL<=0.005 MIU/L-ACNC: 0.24 UIU/ML (ref 0.4–4)
URN SPEC COLLECT METH UR: ABNORMAL
WBC # BLD AUTO: 8.21 K/UL (ref 3.9–12.7)
WBC #/AREA URNS AUTO: 2 /HPF (ref 0–5)
YEAST UR QL AUTO: ABNORMAL

## 2023-12-20 PROCEDURE — 86706 HEP B SURFACE ANTIBODY: CPT | Performed by: INTERNAL MEDICINE

## 2023-12-20 PROCEDURE — 81001 URINALYSIS AUTO W/SCOPE: CPT | Performed by: INTERNAL MEDICINE

## 2023-12-20 PROCEDURE — 99213 OFFICE O/P EST LOW 20 MIN: CPT | Mod: PBBFAC | Performed by: INTERNAL MEDICINE

## 2023-12-20 PROCEDURE — 99204 PR OFFICE/OUTPT VISIT, NEW, LEVL IV, 45-59 MIN: ICD-10-PCS | Mod: S$PBB,,, | Performed by: INTERNAL MEDICINE

## 2023-12-20 PROCEDURE — 86704 HEP B CORE ANTIBODY TOTAL: CPT | Performed by: INTERNAL MEDICINE

## 2023-12-20 PROCEDURE — 83036 HEMOGLOBIN GLYCOSYLATED A1C: CPT | Performed by: INTERNAL MEDICINE

## 2023-12-20 PROCEDURE — 82043 UR ALBUMIN QUANTITATIVE: CPT | Performed by: INTERNAL MEDICINE

## 2023-12-20 PROCEDURE — 99999 PR PBB SHADOW E&M-EST. PATIENT-LVL III: CPT | Mod: PBBFAC,,, | Performed by: INTERNAL MEDICINE

## 2023-12-20 PROCEDURE — 87389 HIV-1 AG W/HIV-1&-2 AB AG IA: CPT | Performed by: INTERNAL MEDICINE

## 2023-12-20 PROCEDURE — 82306 VITAMIN D 25 HYDROXY: CPT | Performed by: INTERNAL MEDICINE

## 2023-12-20 PROCEDURE — 84443 ASSAY THYROID STIM HORMONE: CPT | Performed by: INTERNAL MEDICINE

## 2023-12-20 PROCEDURE — 99204 OFFICE O/P NEW MOD 45 MIN: CPT | Mod: S$PBB,,, | Performed by: INTERNAL MEDICINE

## 2023-12-20 PROCEDURE — 85025 COMPLETE CBC W/AUTO DIFF WBC: CPT | Performed by: INTERNAL MEDICINE

## 2023-12-20 PROCEDURE — 84439 ASSAY OF FREE THYROXINE: CPT | Performed by: INTERNAL MEDICINE

## 2023-12-20 PROCEDURE — 86803 HEPATITIS C AB TEST: CPT | Performed by: INTERNAL MEDICINE

## 2023-12-20 PROCEDURE — 80053 COMPREHEN METABOLIC PANEL: CPT | Performed by: INTERNAL MEDICINE

## 2023-12-20 PROCEDURE — 87340 HEPATITIS B SURFACE AG IA: CPT | Performed by: INTERNAL MEDICINE

## 2023-12-20 PROCEDURE — 36415 COLL VENOUS BLD VENIPUNCTURE: CPT | Performed by: INTERNAL MEDICINE

## 2023-12-20 PROCEDURE — 80061 LIPID PANEL: CPT | Performed by: INTERNAL MEDICINE

## 2023-12-20 PROCEDURE — 99999 PR PBB SHADOW E&M-EST. PATIENT-LVL III: ICD-10-PCS | Mod: PBBFAC,,, | Performed by: INTERNAL MEDICINE

## 2023-12-20 RX ORDER — ATORVASTATIN CALCIUM 20 MG/1
20 TABLET, FILM COATED ORAL NIGHTLY
Qty: 90 TABLET | Refills: 3 | Status: SHIPPED | OUTPATIENT
Start: 2023-12-20

## 2023-12-20 RX ORDER — ACETAMINOPHEN 500 MG
5000 TABLET ORAL DAILY
COMMUNITY
Start: 2023-12-20

## 2023-12-20 RX ORDER — FINASTERIDE 5 MG/1
5 TABLET, FILM COATED ORAL DAILY
Qty: 90 TABLET | Refills: 3 | Status: SHIPPED | OUTPATIENT
Start: 2023-12-20 | End: 2024-12-19

## 2023-12-20 RX ORDER — ZOSTER VACCINE RECOMBINANT, ADJUVANTED 50 MCG/0.5
0.5 KIT INTRAMUSCULAR ONCE
Qty: 1 EACH | Refills: 0 | Status: SHIPPED | OUTPATIENT
Start: 2023-12-20 | End: 2023-12-20

## 2023-12-20 RX ORDER — LEVOTHYROXINE SODIUM 137 UG/1
150 TABLET ORAL DAILY
Qty: 90 TABLET | Refills: 3 | Status: SHIPPED | OUTPATIENT
Start: 2023-12-20

## 2023-12-20 RX ORDER — TAMSULOSIN HYDROCHLORIDE 0.4 MG/1
1 CAPSULE ORAL DAILY
Qty: 90 CAPSULE | Refills: 3 | Status: SHIPPED | OUTPATIENT
Start: 2023-12-20

## 2023-12-20 RX ORDER — LOSARTAN POTASSIUM 25 MG/1
25 TABLET ORAL NIGHTLY
Qty: 90 TABLET | Refills: 3 | Status: SHIPPED | OUTPATIENT
Start: 2023-12-20

## 2023-12-20 RX ORDER — DAPAGLIFLOZIN 10 MG/1
10 TABLET, FILM COATED ORAL DAILY
Qty: 90 TABLET | Refills: 3 | Status: SHIPPED | OUTPATIENT
Start: 2023-12-20 | End: 2024-03-04 | Stop reason: ALTCHOICE

## 2023-12-20 RX ORDER — METFORMIN HYDROCHLORIDE 500 MG/1
500 TABLET ORAL 2 TIMES DAILY
Qty: 180 TABLET | Refills: 3 | Status: SHIPPED | OUTPATIENT
Start: 2023-12-20

## 2023-12-20 NOTE — PROGRESS NOTES
INTERNAL MEDICINE CLINIC    Initial Visit to Establish Care    PRESENTING HISTORY     Previous PCP:  Dr Segundo    Chief Complaint/Reason for Visit:     Chief Complaint   Patient presents with    Establish Care     New Patient       History of Present Illness & ROS : Mr. Waldemar Marquez is a 63 y.o. male.      Reviewed in details past medical history.  He has urgent prostate issues and will undergo surgery.    Reviewed vaccination.      PAST HISTORY:     Past Medical History:   Diagnosis Date    Dyslipidemia 02/25/2013    Hypertension associated with type 2 diabetes mellitus 12/19/2023    Kidney stone 12/19/2023    Postablative hypothyroidism 02/25/2013    radioactive ablation    Type 2 diabetes mellitus without complication, without long-term current use of insulin 03/14/2011    Vitamin D deficiency 02/25/2013       History reviewed. No pertinent surgical history.    Family History   Problem Relation Age of Onset    Breast cancer Sister        Social History     Socioeconomic History    Marital status:    Tobacco Use    Smoking status: Every Day     Current packs/day: 0.25     Average packs/day: 0.3 packs/day for 55.0 years (17.5 ttl pk-yrs)     Types: Cigarettes    Smokeless tobacco: Never   Substance and Sexual Activity    Alcohol use: Never    Drug use: Never    Sexual activity: Not Currently     Partners: Female   Social History Narrative    Works at [employer]        : John        1 offspring      Social Determinants of Health     Financial Resource Strain: Low Risk  (12/20/2023)    Overall Financial Resource Strain (CARDIA)     Difficulty of Paying Living Expenses: Not hard at all   Food Insecurity: No Food Insecurity (12/20/2023)    Hunger Vital Sign     Worried About Running Out of Food in the Last Year: Never true     Ran Out of Food in the Last Year: Never true   Transportation Needs: No Transportation Needs (12/20/2023)    PRAPARE - Transportation     Lack of Transportation (Medical): No      Lack of Transportation (Non-Medical): No   Physical Activity: Sufficiently Active (12/20/2023)    Exercise Vital Sign     Days of Exercise per Week: 7 days     Minutes of Exercise per Session: 40 min   Stress: Stress Concern Present (12/20/2023)    [employer] - Occupational Stress Questionnaire     Feeling of Stress : To some extent   Social Connections: Unknown (12/20/2023)    Social Connection and Isolation Panel [NHANES]     Frequency of Communication with Friends and Family: More than three times a week     Frequency of Social Gatherings with Friends and Family: More than three times a week     Active Member of Clubs or Organizations: Yes     Attends Club or Organization Meetings: More than 4 times per year     Marital Status:    Housing Stability: Low Risk  (12/20/2023)    Housing Stability Vital Sign     Unable to Pay for Housing in the Last Year: No     Number of Places Lived in the Last Year: 1     Unstable Housing in the Last Year: No       MEDICATIONS & ALLERGIES:     Current Outpatient Medications on File Prior to Visit   Medication Sig Dispense Refill    atorvastatin (LIPITOR) 20 MG tablet Take 20 mg by mouth nightly.             finasteride (PROSCAR) 5 mg tablet Take 1 tablet (5 mg total) by mouth once daily. 90 tablet 3    levothyroxine (SYNTHROID) 150 MCG tablet Take 150 mcg by mouth once daily.      losartan (COZAAR) 25 MG tablet Take 25 mg by mouth nightly.      metFORMIN (GLUCOPHAGE) 500 MG tablet Take 500 mg by mouth 2 (two) times daily.      tamsulosin (FLOMAX) 0.4 mg Cap Take 1 capsule by mouth once daily 30 capsule 0       Review of patient's allergies indicates:  No Known Allergies    Medications Reconciliation:   I have reconciled the patient's home medications with the patient/family. I have updated all changes.  Refer to After-Visit Medication List.    OBJECTIVE:     Vital Signs:  Vitals:    12/20/23 1622   BP: 130/80   Pulse: 68     Wt Readings from  Last 3 Encounters:   12/20/23 1622 83.3 kg (183 lb 10.3 oz)   12/18/23 0904 83.7 kg (184 lb 8.4 oz)   12/04/23 0940 80.4 kg (177 lb 4 oz)     Body mass index is 25.61 kg/m².     Physical Exam:  General: Well developed, well nourished. No distress.  HEENT: Head is normocephalic, atraumatic  Eyes: Clear conjunctiva.  Neck: Supple, symmetrical neck; trachea midline.  Lungs: Clear to auscultation bilaterally and normal respiratory effort.  Cardiovascular: Heart with regular rate and rhythm.    Extremities: No LE edema.  Abdomen: Abdomen is soft, non-tender non-distended with normal bowel sounds.  Skin: Skin color, texture, turgor normal. No rashes.  Musculoskeletal: Normal gait.   Psychiatric: Normal affect. Alert.    Laboratory  Lab Results   Component Value Date    WBC 8.21 12/20/2023    HGB 15.0 12/20/2023    HCT 44.5 12/20/2023     12/20/2023    CHOL 158 12/20/2023    TRIG 89 12/20/2023    HDL 60 12/20/2023    ALT 14 12/20/2023    AST 17 12/20/2023     12/20/2023    K 4.2 12/20/2023     12/20/2023    CREATININE 1.2 12/20/2023    BUN 26 (H) 12/20/2023    CO2 21 (L) 12/20/2023    TSH 0.238 (L) 12/20/2023    PSA 1.2 12/16/2014    HGBA1C 6.9 (H) 12/20/2023       ASSESSMENT & PLAN:   New patient who has not seen Primary Care Provider at [facility] for the past 3 years.  Patient is new to me. Medical, surgical, social, medication and allergy histories were obtained during this visit.    Establishing care with new doctor, encounter for  - Reviewed and updated past and current medical problems.  Discussed treatment of current medical problems    -     Lipid Panel; Future; Expected date: 12/20/2023  -     CBC Auto Differential; Future; Expected date: 12/20/2023  -     Comprehensive Metabolic Panel; Future; Expected date: 12/20/2023  -     TSH; Future; Expected date: 12/20/2023  -     Hemoglobin A1C; Future; Expected date: 12/20/2023  -     HIV 1/2 Ag/Ab (4th Gen); Future; Expected date: 12/20/2023  -      Hepatitis C Antibody; Future; Expected date: 12/20/2023  -     HEPATITIS B SURFACE ANTIBODY; Future; Expected date: 12/20/2023  -     HEPATITIS B SURFACE ANTIGEN; Future; Expected date: 12/20/2023  -     HEPATITIS B CORE ANTIBODY, TOTAL; Future; Expected date: 12/20/2023  -     Vitamin D; Future; Expected date: 06/17/2024    Screening for STD (sexually transmitted disease)  -     HIV 1/2 Ag/Ab (4th Gen); Future; Expected date: 12/20/2023  -     Hepatitis C Antibody; Future; Expected date: 12/20/2023  -     HEPATITIS B SURFACE ANTIBODY; Future; Expected date: 12/20/2023  -     HEPATITIS B SURFACE ANTIGEN; Future; Expected date: 12/20/2023  -     HEPATITIS B CORE ANTIBODY, TOTAL; Future; Expected date: 12/20/2023    Type 2 diabetes mellitus without complication, without long-term current use of insulin    Rx:  -     metFORMIN (GLUCOPHAGE) 500 MG tablet; Take 1 tablet (500 mg total) by mouth 2 (two) times daily.  Dispense: 180 tablet; Refill: 3  -     dapagliflozin propanediol (FARXIGA) 10 mg tablet; Take 1 tablet (10 mg total) by mouth once daily.  Dispense: 90 tablet; Refill: 3    Order:  -     Urinalysis, Reflex to Urine Culture Urine, Clean Catch  -     Microalbumin/Creatinine Ratio, Urine  -     Hemoglobin A1C; Future; Expected date: 12/20/2023    Dyslipidemia    -     atorvastatin (LIPITOR) 20 MG tablet; Take 1 tablet (20 mg total) by mouth nightly.  Dispense: 90 tablet; Refill: 3    Hypertension associated with type 2 diabetes mellitus    -     losartan (COZAAR) 25 MG tablet; Take 1 tablet (25 mg total) by mouth nightly.  Dispense: 90 tablet; Refill: 3    Postablative hypothyroidism    -     levothyroxine (SYNTHROID) 137 MCG Tab tablet; Take 1 tablet (137 mcg total) by mouth once daily.  Dispense: 90 tablet; Refill: 3    Kidney stone  - Drink plenty of lemon water.    Vitamin D deficiency    -     cholecalciferol, vitamin D3, 125 mcg (5,000 unit) Tab; Take 1 tablet (5,000 Units total) by mouth once  daily.    Benign prostatic hyperplasia with urinary frequency  -     tamsulosin (FLOMAX) 0.4 mg Cap; Take 1 capsule (0.4 mg total) by mouth once daily.  Dispense: 90 capsule; Refill: 3  -     finasteride (PROSCAR) 5 mg tablet; Take 1 tablet (5 mg total) by mouth once daily.  Dispense: 90 tablet; Refill: 3    Elevated PSA  Urology 12-18-23  Patient needs a prostate needle biopsy as he likely has prostate cancer.  We will subsequently need imaging as part of a metastatic workup, likely a PSMA PET scan.  I would also perform cystoscopy at the time of biopsy to evaluate his bladder.  In the event that the patient were to have prostate cancer he would likely need ADT with radiation therapy, and due to his extension of the disease into the bladder, I would be very hesitant to consider any radical prostatectomy in the event that he does have localized disease.  Prior to proceeding with any radiation, he would also likely need some sort of de-obstruction.  The patient was strongly considering leaving [facility] and going back to [place] for the next few months which I advised him against.      Preventive Health Maintenance:    Need for pneumococcal 20-valent conjugate vaccination  -     pneumoc 20-geni conj-dip cr,PF, (PREVNAR-20, PF,) 0.5 mL Syrg injection; Inject 0.5 mLs into the muscle once. for 1 dose  Dispense: 0.5 mL; Refill: 0    Need for Tdap vaccination  -     DIPH,PERTUSS,ACEL,,TET VAC,PF, ADULT (ADACEL) 2 Lf-(2.5-5-3-5 mcg)-5Lf/0.5 mL Syrg; Inject 0.5 mLs into the muscle once. for 1 dose  Dispense: 0.5 mL; Refill: 0    Need for shingles vaccine  -     varicella-zoster [id number], PF, (SHINGRIX, PF,) 50 mcg/0.5 mL injection; Inject 0.5 mLs into the muscle once. for 1 dose  Dispense: 1 each; Refill: 0      Hep B vaccination series (negative Hep B S ab and antigen, plus core).    Return to Clinic for Follow Up with me:   March 4 Monday at 8 am.      Scheduled Follow-up :  Future Appointments   Date Time Provider Department  Center   12/27/2023  1:45 PM Reinaldo Ervin MD [facility] [place] [facility]   3/4/2024  8:00 AM [facility]       After Visit Medication List :     Medication List            Accurate as of December 20, 2023 11:59 PM. If you have any questions, ask your nurse or doctor.                START taking these medications      cholecalciferol (vitamin D3) 125 mcg (5,000 unit) Tab  Take 1 tablet (5,000 Units total) by mouth once daily.  Started by: Pawan Garza MD     DIPH,PERTUSS(ACEL),TET VAC(PF) ADULT 2 Lf-(2.5-5-3-5 mcg)-5Lf/0.5 mL Syrg  Commonly known as: ADACEL  Inject 0.5 mLs into the muscle once. for 1 dose  Started by: Pawan Garza MD     FARXIGA 10 mg tablet  Generic drug: dapagliflozin propanediol  Take 1 tablet (10 mg total) by mouth once daily.  Started by: Pawan Garza MD     * hepatitis B virus vacc.rec(PF) 20 mcg/mL Syrg  Commonly known as: ENGERIX-B  Inject 1 mL (20 mcg total) into the muscle once. for 1 dose  Started by: Pawan Garza MD     * hepatitis B virus vacc.rec(PF) 20 mcg/mL Syrg  Commonly known as: ENGERIX-B  Inject 1 mL (20 mcg total) into the muscle once. for 1 dose  Start taking on: January 22, 2024  Started by: Pawan Garza MD     * hepatitis B virus vacc.rec(PF) 20 mcg/mL Syrg  Commonly known as: ENGERIX-B  Inject 1 mL (20 mcg total) into the muscle once. for 1 dose  Start taking on: June 21, 2024  Started by: Pawan Garza MD     pneumoc 20-geni conj-dip cr(PF) 0.5 mL Syrg injection  Commonly known as: PREVNAR-20 (PF)  Inject 0.5 mLs into the muscle once. for 1 dose  Started by: Pawan Garza MD     SHINGRIX (PF) 50 mcg/0.5 mL injection  Generic drug: varicella-zoster [id number] (PF)  Inject 0.5 mLs into the muscle once. for 1 dose  Started by: Pawan Garza MD           * This list has 3 medication(s) that are the same as other medications prescribed for you. Read the directions carefully, and ask your doctor or other care provider to review them with you.                 CHANGE how you take these medications      levothyroxine 137 MCG Tab tablet  Commonly known as: SYNTHROID  Take 1 tablet (137 mcg total) by mouth once daily.  What changed:   medication strength  how much to take  Changed by: Pawan Garza MD     tamsulosin 0.4 mg Cap  Commonly known as: FLOMAX  Take 1 capsule (0.4 mg total) by mouth once daily.  What changed: when to take this  Changed by: Pawan Garza MD            CONTINUE taking these medications      atorvastatin 20 MG tablet  Commonly known as: LIPITOR  Take 1 tablet (20 mg total) by mouth nightly.     finasteride 5 mg tablet  Commonly known as: PROSCAR  Take 1 tablet (5 mg total) by mouth once daily.     losartan 25 MG tablet  Commonly known as: COZAAR  Take 1 tablet (25 mg total) by mouth nightly.     metFORMIN 500 MG tablet  Commonly known as: GLUCOPHAGE  Take 1 tablet (500 mg total) by mouth 2 (two) times daily.            STOP taking these medications      SPIKEVAX [id number](12Y UP)(PF) 50 mcg/0.5 mL injection  Generic drug: COVID [id number](12up)(andu)(PF)  Stopped by: Pawan Garza MD               Where to Get Your Medications        These medications were sent to [facility] [address]  [address] [place] [address]      Phone: [phone]   atorvastatin 20 MG tablet  DIPH,PERTUSS(ACEL),TET VAC(PF) ADULT 2 Lf-(2.5-5-3-5 mcg)-5Lf/0.5 mL Syrg  FARXIGA 10 mg tablet  finasteride 5 mg tablet  hepatitis B virus vacc.rec(PF) 20 mcg/mL Syrg  hepatitis B virus vacc.rec(PF) 20 mcg/mL Syrg  hepatitis B virus vacc.rec(PF) 20 mcg/mL Syrg  levothyroxine 137 MCG Tab tablet  losartan 25 MG tablet  metFORMIN 500 MG tablet  pneumoc 20-geni conj-dip cr(PF) 0.5 mL Syrg injection  SHINGRIX (PF) 50 mcg/0.5 mL injection  tamsulosin 0.4 mg Cap       You can get these medications from any pharmacy    You don't need a prescription for these medications  cholecalciferol (vitamin D3) 125 mcg (5,000 unit) Tab          Signing Physician:  Pawan Garza MD

## 2023-12-27 ENCOUNTER — PROCEDURE VISIT (OUTPATIENT)
Dept: UROLOGY | Facility: CLINIC | Age: 63
End: 2023-12-27
Payer: MEDICARE

## 2023-12-27 VITALS
HEART RATE: 64 BPM | HEIGHT: 71 IN | SYSTOLIC BLOOD PRESSURE: 150 MMHG | DIASTOLIC BLOOD PRESSURE: 87 MMHG | BODY MASS INDEX: 25.62 KG/M2 | WEIGHT: 183 LBS

## 2023-12-27 DIAGNOSIS — R97.20 ELEVATED PSA: ICD-10-CM

## 2023-12-27 DIAGNOSIS — C61 PROSTATE CANCER: Primary | ICD-10-CM

## 2023-12-27 PROCEDURE — 76872 US TRANSRECTAL: CPT | Mod: PBBFAC,PO | Performed by: UROLOGY

## 2023-12-27 PROCEDURE — 76872 US TRANSRECTAL: CPT | Mod: 26,S$PBB,, | Performed by: UROLOGY

## 2023-12-27 PROCEDURE — 52000 PR CYSTOURETHROSCOPY: ICD-10-PCS | Mod: S$PBB,,, | Performed by: UROLOGY

## 2023-12-27 PROCEDURE — G0416 PROSTATE BIOPSY, ANY MTHD: HCPCS | Mod: 26,,, | Performed by: PATHOLOGY

## 2023-12-27 PROCEDURE — G0416 PROSTATE BIOPSY, ANY MTHD: ICD-10-PCS | Mod: 26,,, | Performed by: PATHOLOGY

## 2023-12-27 PROCEDURE — 76872 PR US TRANSRECTAL: ICD-10-PCS | Mod: 26,S$PBB,, | Performed by: UROLOGY

## 2023-12-27 PROCEDURE — 52000 CYSTOURETHROSCOPY: CPT | Mod: S$PBB,,, | Performed by: UROLOGY

## 2023-12-27 PROCEDURE — 88305 TISSUE EXAM BY PATHOLOGIST: CPT | Mod: 59 | Performed by: PATHOLOGY

## 2023-12-27 PROCEDURE — 52000 CYSTOURETHROSCOPY: CPT | Mod: PBBFAC,PO | Performed by: UROLOGY

## 2023-12-27 NOTE — PROCEDURES
[facility] - Urology   Cystoscopy and Transrectal Ultrasound of the Prostate Procedure Notes    Cystoscopy Note     Procedure:   Flexible cysto-uretheroscopy    Pre Procedure Diagnosis:  Possible prostate cancer and bladder mass    Post Procedure Diagnosis:  Same    Surgeon: Reinaldo Ervin MD     Anesthesia: 2% uro-jet lidocaine jelly for local analgesia    Procedure note:  A flexible cysto-urethroscopy was performed after consent was obtained.  The risks and benefits were explained. Prior to procedure a timeout was performed and the correct patient, procedure, and site was verified. 2% lidocaine urojet was used for local analgesia. The genitalia was prepped and draped in the sterile fashion.     The flexible scope was advanced into the urethra and into the bladder. There There were no urethral strictures noted throughout the course of the urethra. during scope passage.     The bladder was completely surveyed in a systematic fashion. The bilateral ureteral orifices were identified in their normal orthotopic locations with efflux noted bilaterally. The remained of the bladder was evaluated. An abnormality was seen:  There is clear evidence of what appears to be T4 prostate cancer extending from the prostate encompassing the entire base of the bladder and bladder neck .     The patient tolerated the procedure well without complication.     Findings in summary:  Evidence of T4 prostate cancer    Transrectal Ultrasound of the Prostate with Prostate Needle Biopsy Note     Surgeon: Aziza    Informed consent was obtained. He was placed into the lateral decubitus position. BEV revealed no suspicious nodules or lesions. Transrectal ultrasound probe placed into the rectum and ultrasound of the prostate performed.     A prostate block was performed.  A 12 core systematic prostate ultrasound was then completed.    Plan:     Patient with evidence of clinical T4 prostate cancer.  We will order a PSMA PET scan.  Follow up with me  for path results and scan review.    Reinaldo Ervin MD  Urology  [facility]

## 2023-12-29 LAB
FINAL PATHOLOGIC DIAGNOSIS: NORMAL
GROSS: NORMAL
Lab: NORMAL
MICROSCOPIC EXAM: NORMAL

## 2024-01-04 ENCOUNTER — HOSPITAL ENCOUNTER (OUTPATIENT)
Dept: RADIOLOGY | Facility: HOSPITAL | Age: 64
Discharge: HOME | End: 2024-01-04
Attending: UROLOGY
Payer: MEDICARE

## 2024-01-04 DIAGNOSIS — C61 PROSTATE CANCER: ICD-10-CM

## 2024-01-04 DIAGNOSIS — R97.20 ELEVATED PSA: ICD-10-CM

## 2024-01-04 PROCEDURE — 78815 PET IMAGE W/CT SKULL-THIGH: CPT | Mod: TC,PS

## 2024-01-04 PROCEDURE — 78815 PET IMAGE W/CT SKULL-THIGH: CPT | Mod: 26,PI,, | Performed by: STUDENT IN AN ORGANIZED HEALTH CARE EDUCATION/TRAINING PROGRAM

## 2024-01-04 PROCEDURE — A9595 NM PET CT F 18 PYL PSMA, MIDTHIGH TO VERTEX: HCPCS | Mod: TB

## 2024-01-08 ENCOUNTER — TELEPHONE (OUTPATIENT)
Dept: UROLOGY | Facility: CLINIC | Age: 64
End: 2024-01-08
Payer: MEDICARE

## 2024-01-08 RX ORDER — BICALUTAMIDE 50 MG/1
50 TABLET, FILM COATED ORAL DAILY
Qty: 30 TABLET | Refills: 11 | Status: SHIPPED | OUTPATIENT
Start: 2024-01-08 | End: 2025-01-07

## 2024-01-08 NOTE — TELEPHONE ENCOUNTER
Left message for patient on VM via  [facility] id number [id number] indicating that patient needs f/u with Dr. Ervin for PET scan results.

## 2024-01-08 NOTE — TELEPHONE ENCOUNTER
----- Message from Reinaldo Ervin MD sent at 1/8/2024 11:33 AM CST -----  Needs follow up ASAP, can see him this week if possible. I am also prescribing him Casodex, let him know he should pick it up and start it today and we can discuss at follow up

## 2024-01-09 ENCOUNTER — TELEPHONE (OUTPATIENT)
Dept: UROLOGY | Facility: CLINIC | Age: 64
End: 2024-01-09
Payer: MEDICARE

## 2024-01-09 NOTE — TELEPHONE ENCOUNTER
Called pt via  [facility] id number [id number]. He left  for patient indicating that he needs a follow up appt ASAP and also that a medication was called into [facility] that should be started ASAP. I also sent a [facility] message. This is the second day calling.

## 2024-01-10 ENCOUNTER — OFFICE VISIT (OUTPATIENT)
Dept: UROLOGY | Facility: CLINIC | Age: 64
End: 2024-01-10
Payer: MEDICARE

## 2024-01-10 ENCOUNTER — TELEPHONE (OUTPATIENT)
Dept: HEMATOLOGY/ONCOLOGY | Facility: CLINIC | Age: 64
End: 2024-01-10
Payer: MEDICARE

## 2024-01-10 VITALS
SYSTOLIC BLOOD PRESSURE: 125 MMHG | DIASTOLIC BLOOD PRESSURE: 71 MMHG | BODY MASS INDEX: 25.73 KG/M2 | HEIGHT: 71 IN | HEART RATE: 65 BPM | WEIGHT: 183.75 LBS

## 2024-01-10 DIAGNOSIS — C79.51 PROSTATE CANCER METASTATIC TO BONE: Primary | ICD-10-CM

## 2024-01-10 DIAGNOSIS — R97.20 ELEVATED PSA: ICD-10-CM

## 2024-01-10 DIAGNOSIS — C61 PROSTATE CANCER METASTATIC TO BONE: Primary | ICD-10-CM

## 2024-01-10 DIAGNOSIS — N20.1 LEFT URETERAL STONE: ICD-10-CM

## 2024-01-10 PROCEDURE — 3078F DIAST BP <80 MM HG: CPT | Mod: CPTII,S$GLB,, | Performed by: UROLOGY

## 2024-01-10 PROCEDURE — 3008F BODY MASS INDEX DOCD: CPT | Mod: CPTII,S$GLB,, | Performed by: UROLOGY

## 2024-01-10 PROCEDURE — 1126F AMNT PAIN NOTED NONE PRSNT: CPT | Mod: CPTII,S$GLB,, | Performed by: UROLOGY

## 2024-01-10 PROCEDURE — 99215 OFFICE O/P EST HI 40 MIN: CPT | Mod: S$GLB,,, | Performed by: UROLOGY

## 2024-01-10 PROCEDURE — 1101F PT FALLS ASSESS-DOCD LE1/YR: CPT | Mod: CPTII,S$GLB,, | Performed by: UROLOGY

## 2024-01-10 PROCEDURE — 1160F RVW MEDS BY RX/DR IN RCRD: CPT | Mod: CPTII,S$GLB,, | Performed by: UROLOGY

## 2024-01-10 PROCEDURE — 3074F SYST BP LT 130 MM HG: CPT | Mod: CPTII,S$GLB,, | Performed by: UROLOGY

## 2024-01-10 PROCEDURE — 99999 PR PBB SHADOW E&M-EST. PATIENT-LVL V: CPT | Mod: PBBFAC,,, | Performed by: UROLOGY

## 2024-01-10 PROCEDURE — 3288F FALL RISK ASSESSMENT DOCD: CPT | Mod: CPTII,S$GLB,, | Performed by: UROLOGY

## 2024-01-10 PROCEDURE — 1159F MED LIST DOCD IN RCRD: CPT | Mod: CPTII,S$GLB,, | Performed by: UROLOGY

## 2024-01-10 RX ORDER — CEFAZOLIN SODIUM 2 G/50ML
2 SOLUTION INTRAVENOUS
Status: CANCELLED | OUTPATIENT
Start: 2024-01-10

## 2024-01-10 RX ORDER — SODIUM CHLORIDE 9 MG/ML
INJECTION, SOLUTION INTRAVENOUS CONTINUOUS
Status: CANCELLED | OUTPATIENT
Start: 2024-01-10

## 2024-01-10 NOTE — PROGRESS NOTES
[facility] - [facility]rology   Clinic Note    SUBJECTIVE:     Chief Complaint   Patient presents with    Other     PSMA results        Referral from: No ref. provider found.    History of Present Illness:  Waldemar Marquez is a 63 y.o. male who presents to clinic for lower urinary tract symptoms and BPH.    Patient with bothersome LUTS, including urgency, weak stream, nocturia x 3 times per night. Denies any UTIs. No hematuria. No PSA on file. Has been on tamsulosin with little relief.    Previous medications for BPH include: yes: tamsulosin  AUA Symptom Score: pt unable to fill out, speaks cantonese  Previous prostatic surgery: No    12/04/2023  Here with elevated PSA. PSA is 26 corrected for finasteride.    01/10/2024  Prostate biopsy with Gl 3+4 disease  PSMA pet with osseous mets  Cysto with bladder invasion    PSA:  Lab Results   Component Value Date    PSA 1.2 12/16/2014    PSADIAG 13.0 (H) 11/27/2023    PSATOTAL 15.4 (H) 12/04/2023    PSAFREE 1.74 (H) 12/04/2023     01/10/2024  Here to review the results of his prostate MRI.    MRI Prostate:  Prostate Volume: 50 cc  PSA: 30.8  PSA Density: 0.61  Overall Assessment: PI-RADS 5, 1 targets (entire prostate)  Apparent extension into the bladder    IPSS: 25/5    Patient endorses no additional complaints at this time.    Past Medical History:   Diagnosis Date    Dyslipidemia 02/25/2013    Hypertension associated with type 2 diabetes mellitus 12/19/2023    Kidney stone 12/19/2023    Postablative hypothyroidism 02/25/2013    radioactive ablation    Type 2 diabetes mellitus without complication, without long-term current use of insulin 03/14/2011    Vitamin D deficiency 02/25/2013       History reviewed. No pertinent surgical history.    Family History   Problem Relation Age of Onset    Breast cancer Sister        Social History     Tobacco Use    Smoking status: Every Day     Current packs/day: 0.25     Average packs/day: 0.3 packs/day for 55.0 years (17.5 ttl pk-yrs)     Types:  "Cigarettes    Smokeless tobacco: Never   Substance Use Topics    Alcohol use: Never    Drug use: Never       Current Outpatient Medications on File Prior to Visit   Medication Sig Dispense Refill    atorvastatin (LIPITOR) 20 MG tablet Take 1 tablet (20 mg total) by mouth nightly. 90 tablet 3    bicalutamide (CASODEX) 50 MG Tab Take 1 tablet (50 mg total) by mouth once daily. 30 tablet 11    cholecalciferol, vitamin D3, 125 mcg (5,000 unit) Tab Take 1 tablet (5,000 Units total) by mouth once daily.      dapagliflozin propanediol (FARXIGA) 10 mg tablet Take 1 tablet (10 mg total) by mouth once daily. 90 tablet 3    finasteride (PROSCAR) 5 mg tablet Take 1 tablet (5 mg total) by mouth once daily. 90 tablet 3    [START ON 1/22/2024] hepatitis B virus vacc.rec,PF, (ENGERIX-B) 20 mcg/mL Syrg Inject 1 mL (20 mcg total) into the muscle once. for 1 dose 1 mL 0    [START ON 6/21/2024] hepatitis B virus vacc.rec,PF, (ENGERIX-B) 20 mcg/mL Syrg Inject 1 mL (20 mcg total) into the muscle once. for 1 dose 1 mL 0    levothyroxine (SYNTHROID) 137 MCG Tab tablet Take 1 tablet (137 mcg total) by mouth once daily. 90 tablet 3    losartan (COZAAR) 25 MG tablet Take 1 tablet (25 mg total) by mouth nightly. 90 tablet 3    metFORMIN (GLUCOPHAGE) 500 MG tablet Take 1 tablet (500 mg total) by mouth 2 (two) times daily. 180 tablet 3    tamsulosin (FLOMAX) 0.4 mg Cap Take 1 capsule (0.4 mg total) by mouth once daily. 90 capsule 3     No current facility-administered medications on file prior to visit.       Review of patient's allergies indicates:  No Known Allergies    Review of Systems:  A review of 10+ systems was conducted with pertinent positive and negative findings documented in HPI with all other systems reviewed and negative.    OBJECTIVE:     Estimated body mass index is 25.63 kg/m² as calculated from the following:    Height as of this encounter: 5' 11" (1.803 m).    Weight as of this encounter: 83.3 kg (183 lb 12.1 oz).    Vital " Signs (Most Recent)  Vitals:    01/10/24 1445   BP: 125/71   Pulse: 65       Physical Exam:  GENERAL: patient sitting comfortably  HEENT: normocephalic  NECK: supple, no JVD  PULM: normal chest rise, no increased WOB  HEART: non-diaphoretic  ABDO: soft, nondistended, nontender  BACK: no CVA tenderness bilaterally  SKIN: warm, dry, well perfused  EXT: no bruising or edema  NEURO: grossly normal with no focal deficits  PSYCH: appropriate mood and affect    Genitourinary Exam:  deferred    Lab Results   Component Value Date    BUN 26 (H) 12/20/2023    CREATININE 1.2 12/20/2023    WBC 8.21 12/20/2023    HGB 15.0 12/20/2023    HCT 44.5 12/20/2023     12/20/2023    AST 17 12/20/2023    ALT 14 12/20/2023    ALKPHOS 60 12/20/2023    ALBUMIN 3.8 12/20/2023    HGBA1C 6.9 (H) 12/20/2023        Imaging:  I have personally reviewed all relevant imaging studies.    No results found for this or any previous visit (from the past 2160 hour(s)).  Results for orders placed or performed during the hospital encounter of 12/11/23 (from the past 2160 hour(s))   MRI Prostate W W/O Contrast    Impression    Diffusely abnormal appearance of the prostate highly concerning for malignancy (PI-RADS 5).  Suspected involvement of the bilateral seminal vesicles.    Polypoid mass in the right anterolateral bladder wall concerning for involvement of the bladder wall, though primary bladder malignancy not excluded.    Suspicious right pelvic lymph node.    Overall Assessment: PI-RADS 5 - Very high (clinically significant cancer is highly likely to be present)    Number of targets created for potential MR/US fusion biopsy = 1 (encompassing nearly the entire prostate).    Electronically signed by resident: Man Marin  Date:    12/12/2023  Time:    08:40    Electronically signed by: Austyn Bardales MD  Date:    12/12/2023  Time:    17:39     NM PET CT F 18 PYL PSMA, Midthigh to Vertex  Narrative: EXAMINATION:  NM PET CT F 18 PYL PSMA, MIDTHIGH  TO VERTEX    CLINICAL HISTORY:  Elevated prostate specific antigen (PSA)    TECHNIQUE:  Approximately 60 minutes following the intravenous injection of 10.02 mCi F-18 piflufolastat in the left antecubital fossa, PET images were acquired from the proximal thighs to the skull vertex. CT images were also acquired for attenuation correction and anatomic localization and performed without oral or IV contrast.    COMPARISON:  MR prostate 12/11/2023.    FINDINGS:  In the head and neck, there is a tracer-avid left supraclavicular node, 0.8 cm short axis with SUV max 36 (image 90).    In the chest, there are tracer-avid pleural lesions and mediastinal and hilar lymphadenopathy, index lesions below.    *Right apical pleural lesion, 1.2 cm, SUV max 25 (image 100)    *Lower paratracheal node, 1.4 cm short axis, SUV max 63 (image 105)    *Right hilar magda conglomerate, 3.2 x 1.6 cm, SUV max 66 (image 126)    In the abdomen and pelvis, there is markedly increased tracer uptake throughout the prostate, with approximate SUV max 51.  Soft tissue attenuation from the prostate invades into the adjacent bladder, better delineated on recent prostate MR. Innumerable tracer-avid retroperitoneal, bilateral iliac chain, and pelvic sidewall nodes; index lesions below.    *Pericaval node, 1.0 cm short axis, SUV max 34 (image 178)    *Aortocaval node, 1.1 cm short axis, SUV max 51 (image 201)    In the bones, there are multiple tracer-avid osseous lesions, index lesions below.    *Posterior 9th rib sclerotic lesion, SUV max 30 (image 142)    *Anterior S1 vertebral body sclerotic lesion, SUV max 39 (image 228)    Additional CT findings: Moderate left hydroureteronephrosis to the level of a 1.4 cm stone in the proximal ureter (image 195).  Additional bilateral nonobstructing nephroliths.  No right hydronephrosis.  Impression: Findings compatible with biopsy-proven prostate malignancy with bladder invasion.  Tracer-avid lesions concerning for  "supraclavicular, thoracic, abdominopelvic, and osseous metastases as detailed above.    Moderate left hydroureteronephrosis to the level of a 1.4 cm proximal ureteral stone.    This report was flagged in Epic as abnormal.    Electronically signed by resident: Issa Carter  Date:    01/04/2024  Time:    14:48    Electronically signed by: Nitish Maier  Date:    01/05/2024  Time:    09:03       PSA:  Lab Results   Component Value Date    PSA 1.2 12/16/2014    PSADIAG 13.0 (H) 11/27/2023    PSATOTAL 15.4 (H) 12/04/2023    PSAFREE 1.74 (H) 12/04/2023       Testosterone:  No results found for: "TOTALTESTOST", "TESTOSTERONE"     ASSESSMENT     1. Prostate cancer metastatic to bone    2. Elevated PSA    3. Left ureteral stone        PLAN:     Patient with evidence of cT4 metastatic prostate cancer with mets to the bone.  Initiated on androgen deprivation therapy with Casodex.  Needs cystoscopy and TURBT and TURP to de-obstruct him, also need ureteroscopy and laser lithotripsy and stent placement. Referral to medical oncology placed. Offered TURP on 1/24 and 1/31, patient elects for 2/2. Risks, benefits, and alternatives all discussed.    I spent a total of 60 minutes on the day of the visit. This includes face to face time and non-face to face time preparing to see the patient (eg, review of tests), obtaining and/or reviewing separately obtained history, documenting clinical information in the electronic or other health record, independently interpreting results and communicating results to the patient/family/caregiver, or care coordinator.    Reinaldo Ervin MD  Urology  [facility]    Disclaimer: This note has been generated using voice-recognition software. There may be typographical errors that have been missed during proof-reading.     "

## 2024-01-10 NOTE — H&P (VIEW-ONLY)
[facility] - [facility]rology   Clinic Note    SUBJECTIVE:     Chief Complaint   Patient presents with    Other     PSMA results        Referral from: No ref. provider found.    History of Present Illness:  Waldemar Marquez is a 63 y.o. male who presents to clinic for lower urinary tract symptoms and BPH.    Patient with bothersome LUTS, including urgency, weak stream, nocturia x 3 times per night. Denies any UTIs. No hematuria. No PSA on file. Has been on tamsulosin with little relief.    Previous medications for BPH include: yes: tamsulosin  AUA Symptom Score: pt unable to fill out, speaks cantonese  Previous prostatic surgery: No    12/04/2023  Here with elevated PSA. PSA is 26 corrected for finasteride.    01/10/2024  Prostate biopsy with Gl 3+4 disease  PSMA pet with osseous mets  Cysto with bladder invasion    PSA:  Lab Results   Component Value Date    PSA 1.2 12/16/2014    PSADIAG 13.0 (H) 11/27/2023    PSATOTAL 15.4 (H) 12/04/2023    PSAFREE 1.74 (H) 12/04/2023     01/10/2024  Here to review the results of his prostate MRI.    MRI Prostate:  Prostate Volume: 50 cc  PSA: 30.8  PSA Density: 0.61  Overall Assessment: PI-RADS 5, 1 targets (entire prostate)  Apparent extension into the bladder    IPSS: 25/5    Patient endorses no additional complaints at this time.    Past Medical History:   Diagnosis Date    Dyslipidemia 02/25/2013    Hypertension associated with type 2 diabetes mellitus 12/19/2023    Kidney stone 12/19/2023    Postablative hypothyroidism 02/25/2013    radioactive ablation    Type 2 diabetes mellitus without complication, without long-term current use of insulin 03/14/2011    Vitamin D deficiency 02/25/2013       History reviewed. No pertinent surgical history.    Family History   Problem Relation Age of Onset    Breast cancer Sister        Social History     Tobacco Use    Smoking status: Every Day     Current packs/day: 0.25     Average packs/day: 0.3 packs/day for 55.0 years (17.5 ttl pk-yrs)     Types:  "Cigarettes    Smokeless tobacco: Never   Substance Use Topics    Alcohol use: Never    Drug use: Never       Current Outpatient Medications on File Prior to Visit   Medication Sig Dispense Refill    atorvastatin (LIPITOR) 20 MG tablet Take 1 tablet (20 mg total) by mouth nightly. 90 tablet 3    bicalutamide (CASODEX) 50 MG Tab Take 1 tablet (50 mg total) by mouth once daily. 30 tablet 11    cholecalciferol, vitamin D3, 125 mcg (5,000 unit) Tab Take 1 tablet (5,000 Units total) by mouth once daily.      dapagliflozin propanediol (FARXIGA) 10 mg tablet Take 1 tablet (10 mg total) by mouth once daily. 90 tablet 3    finasteride (PROSCAR) 5 mg tablet Take 1 tablet (5 mg total) by mouth once daily. 90 tablet 3    [START ON 1/22/2024] hepatitis B virus vacc.rec,PF, (ENGERIX-B) 20 mcg/mL Syrg Inject 1 mL (20 mcg total) into the muscle once. for 1 dose 1 mL 0    [START ON 6/21/2024] hepatitis B virus vacc.rec,PF, (ENGERIX-B) 20 mcg/mL Syrg Inject 1 mL (20 mcg total) into the muscle once. for 1 dose 1 mL 0    levothyroxine (SYNTHROID) 137 MCG Tab tablet Take 1 tablet (137 mcg total) by mouth once daily. 90 tablet 3    losartan (COZAAR) 25 MG tablet Take 1 tablet (25 mg total) by mouth nightly. 90 tablet 3    metFORMIN (GLUCOPHAGE) 500 MG tablet Take 1 tablet (500 mg total) by mouth 2 (two) times daily. 180 tablet 3    tamsulosin (FLOMAX) 0.4 mg Cap Take 1 capsule (0.4 mg total) by mouth once daily. 90 capsule 3     No current facility-administered medications on file prior to visit.       Review of patient's allergies indicates:  No Known Allergies    Review of Systems:  A review of 10+ systems was conducted with pertinent positive and negative findings documented in HPI with all other systems reviewed and negative.    OBJECTIVE:     Estimated body mass index is 25.63 kg/m² as calculated from the following:    Height as of this encounter: 5' 11" (1.803 m).    Weight as of this encounter: 83.3 kg (183 lb 12.1 oz).    Vital " Signs (Most Recent)  Vitals:    01/10/24 1445   BP: 125/71   Pulse: 65       Physical Exam:  GENERAL: patient sitting comfortably  HEENT: normocephalic  NECK: supple, no JVD  PULM: normal chest rise, no increased WOB  HEART: non-diaphoretic  ABDO: soft, nondistended, nontender  BACK: no CVA tenderness bilaterally  SKIN: warm, dry, well perfused  EXT: no bruising or edema  NEURO: grossly normal with no focal deficits  PSYCH: appropriate mood and affect    Genitourinary Exam:  deferred    Lab Results   Component Value Date    BUN 26 (H) 12/20/2023    CREATININE 1.2 12/20/2023    WBC 8.21 12/20/2023    HGB 15.0 12/20/2023    HCT 44.5 12/20/2023     12/20/2023    AST 17 12/20/2023    ALT 14 12/20/2023    ALKPHOS 60 12/20/2023    ALBUMIN 3.8 12/20/2023    HGBA1C 6.9 (H) 12/20/2023        Imaging:  I have personally reviewed all relevant imaging studies.    No results found for this or any previous visit (from the past 2160 hour(s)).  Results for orders placed or performed during the hospital encounter of 12/11/23 (from the past 2160 hour(s))   MRI Prostate W W/O Contrast    Impression    Diffusely abnormal appearance of the prostate highly concerning for malignancy (PI-RADS 5).  Suspected involvement of the bilateral seminal vesicles.    Polypoid mass in the right anterolateral bladder wall concerning for involvement of the bladder wall, though primary bladder malignancy not excluded.    Suspicious right pelvic lymph node.    Overall Assessment: PI-RADS 5 - Very high (clinically significant cancer is highly likely to be present)    Number of targets created for potential MR/US fusion biopsy = 1 (encompassing nearly the entire prostate).    Electronically signed by resident: Man Marin  Date:    12/12/2023  Time:    08:40    Electronically signed by: Austyn Bardales MD  Date:    12/12/2023  Time:    17:39     NM PET CT F 18 PYL PSMA, Midthigh to Vertex  Narrative: EXAMINATION:  NM PET CT F 18 PYL PSMA, MIDTHIGH  TO VERTEX    CLINICAL HISTORY:  Elevated prostate specific antigen (PSA)    TECHNIQUE:  Approximately 60 minutes following the intravenous injection of 10.02 mCi F-18 piflufolastat in the left antecubital fossa, PET images were acquired from the proximal thighs to the skull vertex. CT images were also acquired for attenuation correction and anatomic localization and performed without oral or IV contrast.    COMPARISON:  MR prostate 12/11/2023.    FINDINGS:  In the head and neck, there is a tracer-avid left supraclavicular node, 0.8 cm short axis with SUV max 36 (image 90).    In the chest, there are tracer-avid pleural lesions and mediastinal and hilar lymphadenopathy, index lesions below.    *Right apical pleural lesion, 1.2 cm, SUV max 25 (image 100)    *Lower paratracheal node, 1.4 cm short axis, SUV max 63 (image 105)    *Right hilar magda conglomerate, 3.2 x 1.6 cm, SUV max 66 (image 126)    In the abdomen and pelvis, there is markedly increased tracer uptake throughout the prostate, with approximate SUV max 51.  Soft tissue attenuation from the prostate invades into the adjacent bladder, better delineated on recent prostate MR. Innumerable tracer-avid retroperitoneal, bilateral iliac chain, and pelvic sidewall nodes; index lesions below.    *Pericaval node, 1.0 cm short axis, SUV max 34 (image 178)    *Aortocaval node, 1.1 cm short axis, SUV max 51 (image 201)    In the bones, there are multiple tracer-avid osseous lesions, index lesions below.    *Posterior 9th rib sclerotic lesion, SUV max 30 (image 142)    *Anterior S1 vertebral body sclerotic lesion, SUV max 39 (image 228)    Additional CT findings: Moderate left hydroureteronephrosis to the level of a 1.4 cm stone in the proximal ureter (image 195).  Additional bilateral nonobstructing nephroliths.  No right hydronephrosis.  Impression: Findings compatible with biopsy-proven prostate malignancy with bladder invasion.  Tracer-avid lesions concerning for  "supraclavicular, thoracic, abdominopelvic, and osseous metastases as detailed above.    Moderate left hydroureteronephrosis to the level of a 1.4 cm proximal ureteral stone.    This report was flagged in Epic as abnormal.    Electronically signed by resident: Issa Carter  Date:    01/04/2024  Time:    14:48    Electronically signed by: Nitish Maier  Date:    01/05/2024  Time:    09:03       PSA:  Lab Results   Component Value Date    PSA 1.2 12/16/2014    PSADIAG 13.0 (H) 11/27/2023    PSATOTAL 15.4 (H) 12/04/2023    PSAFREE 1.74 (H) 12/04/2023       Testosterone:  No results found for: "TOTALTESTOST", "TESTOSTERONE"     ASSESSMENT     1. Prostate cancer metastatic to bone    2. Elevated PSA    3. Left ureteral stone        PLAN:     Patient with evidence of cT4 metastatic prostate cancer with mets to the bone.  Initiated on androgen deprivation therapy with Casodex.  Needs cystoscopy and TURBT and TURP to de-obstruct him, also need ureteroscopy and laser lithotripsy and stent placement. Referral to medical oncology placed. Offered TURP on 1/24 and 1/31, patient elects for 2/2. Risks, benefits, and alternatives all discussed.    I spent a total of 60 minutes on the day of the visit. This includes face to face time and non-face to face time preparing to see the patient (eg, review of tests), obtaining and/or reviewing separately obtained history, documenting clinical information in the electronic or other health record, independently interpreting results and communicating results to the patient/family/caregiver, or care coordinator.    Reinaldo Ervin MD  Urology  [facility]    Disclaimer: This note has been generated using voice-recognition software. There may be typographical errors that have been missed during proof-reading.     "

## 2024-01-11 ENCOUNTER — TELEPHONE (OUTPATIENT)
Dept: UROLOGY | Facility: CLINIC | Age: 64
End: 2024-01-11
Payer: MEDICARE

## 2024-01-11 NOTE — TELEPHONE ENCOUNTER
Called pt via  [facility] ID# [id number]. Left  stating that I was returning his call. I will call his friend/ Ernesto [phone]. I got Ernesto's voicemail and left a VM.

## 2024-01-11 NOTE — TELEPHONE ENCOUNTER
----- Message from Shireen Blanco MA sent at 1/11/2024 10:45 AM CST -----  Please see below. Thanks   ----- Message -----  From: Zenia Saldivar  Sent: 1/11/2024  10:23 AM CST  To: Reinaldo Ervin Staff    Type:  Patient Returning Call    Who Called:Pt   Would the patient rather a call back or a response via [facility]? Call back   Best Call Back Number:[phone]  Additional Information: would like to speak with someone in the office

## 2024-01-16 PROBLEM — C61 PROSTATE CANCER: Status: ACTIVE | Noted: 2024-01-16

## 2024-01-16 NOTE — PROGRESS NOTES
"PATIENT: Waldemar Marquez  MRN: [id number]  DATE: 1/18/2024    Diagnosis:   1. Prostate cancer    2. Elevated PSA    3. Prostate cancer metastatic to bone    4. 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 Secondary malignant neoplasm of bone    6. Hypertension associated with type 2 diabetes mellitus    7. Advance care planning        Chief Complaint: Prostate Cancer      Oncologic History:      Oncologic History Prostate cancer      Oncologic Treatment 1.        Pathology 12/27/23:  BIOPSIES OF 1. RIGHT BASE, 2. RIGHT MID, 3. RIGHT APEX, 4. LEFT APEX, 5. LEFT MID, 6. LEFT BASE OF THE PROSTATE:   ADENOCARCINOMA TOTAL BHUMI SCORE 7 (3+4)--GRADE GROUP 2       Subjective:    History of Present Illness: Mr. Marquez is a 63 y.o. male who presents for evaluation and management of prostate cancer. He is referred by Dr. Ervin.    - presenting symptoms were nocturia, lower urinary tract symptoms  - MRI prostate (12/11/23) revealed "  Diffusely abnormal appearance of the prostate highly concerning for malignancy (PI-RADS 5).  Suspected involvement of the bilateral seminal vesicles" as well as a suspicious right pelvic lymph node.  - PSA (12/27/23) was elevated at 13 ng/mL, 26 when corrected for finasteride  - biopsy of prostate (12/27/23) revealed [place] 3+4=7 (Grade 2) prostate cancer in all biopsy sites.  - PSMA scan (1/4/24) revealed "biopsy-proven prostate malignancy with bladder invasion.  Tracer-avid lesions concerning for supraclavicular, thoracic, abdominopelvic, and osseous metastases."  - per Dr. Ervin's note dated 1/10/24:  "Needs cystoscopy and TURBT and TURP to de-obstruct him, also need ureteroscopy and laser lithotripsy and stent placement." He is scheduled for this procedure on 1/24/24.    - he began bicalutamide about 2 weeks ago.  - today, he endorses fatigue. He denies pain, weight loss, shortness of breath, chest pain, nausea, vomiting, diarrhea, constipation.        Past medical, " surgical, family, and social histories have been reviewed and updated below.    Past Medical History:   Past Medical History:   Diagnosis Date    Dyslipidemia 02/25/2013    Hypertension associated with type 2 diabetes mellitus 12/19/2023    Kidney stone 12/19/2023    Postablative hypothyroidism 02/25/2013    radioactive ablation    Type 2 diabetes mellitus without complication, without long-term current use of insulin 03/14/2011    Vitamin D deficiency 02/25/2013       Past Surgical History: No past surgical history on file.    Family History:   Family History   Problem Relation Age of Onset    Breast cancer Sister        Social History:  reports that he has been smoking cigarettes. He has a 17.5 pack-year smoking history. He has never used smokeless tobacco. He reports that he does not drink alcohol and does not use drugs.    Allergies:  Review of patient's allergies indicates:  No Known Allergies    Medications:  Current Outpatient Medications   Medication Sig Dispense Refill    atorvastatin (LIPITOR) 20 MG tablet Take 1 tablet (20 mg total) by mouth nightly. 90 tablet 3    bicalutamide (CASODEX) 50 MG Tab Take 1 tablet (50 mg total) by mouth once daily. 30 tablet 11    cholecalciferol, vitamin D3, 125 mcg (5,000 unit) Tab Take 1 tablet (5,000 Units total) by mouth once daily.      dapagliflozin propanediol (FARXIGA) 10 mg tablet Take 1 tablet (10 mg total) by mouth once daily. 90 tablet 3    finasteride (PROSCAR) 5 mg tablet Take 1 tablet (5 mg total) by mouth once daily. 90 tablet 3    [START ON 1/22/2024] hepatitis B virus vacc.rec,PF, (ENGERIX-B) 20 mcg/mL Syrg Inject 1 mL (20 mcg total) into the muscle once. for 1 dose 1 mL 0    [START ON 6/21/2024] hepatitis B virus vacc.rec,PF, (ENGERIX-B) 20 mcg/mL Syrg Inject 1 mL (20 mcg total) into the muscle once. for 1 dose 1 mL 0    levothyroxine (SYNTHROID) 137 MCG Tab tablet Take 1 tablet (137 mcg total) by mouth once daily. 90 tablet 3    losartan (COZAAR) 25 MG  tablet Take 1 tablet (25 mg total) by mouth nightly. 90 tablet 3    metFORMIN (GLUCOPHAGE) 500 MG tablet Take 1 tablet (500 mg total) by mouth 2 (two) times daily. 180 tablet 3    tamsulosin (FLOMAX) 0.4 mg Cap Take 1 capsule (0.4 mg total) by mouth once daily. 90 capsule 3     No current facility-administered medications for this visit.       Review of Systems   Constitutional:  Positive for fatigue.   HENT:  Negative for sore throat.    Eyes:  Negative for visual disturbance.   Respiratory:  Negative for shortness of breath.    Cardiovascular:  Negative for chest pain.   Gastrointestinal:  Negative for abdominal pain.   Genitourinary:  Negative for dysuria.   Musculoskeletal:  Negative for back pain.   Skin:  Negative for rash.   Neurological:  Negative for headaches.   Hematological:  Negative for adenopathy.   Psychiatric/Behavioral:  The patient is not nervous/anxious.        ECOG Performance Status:   ECOG SCORE    1 - Restricted in strenuous activity-ambulatory and able to carry out work of a light nature         Objective:      Vitals:   Vitals:    01/18/24 1419   BP: (!) 150/78   BP Location: Left arm   Patient Position: Sitting   BP Method: Medium (Automatic)   Pulse: 66   Resp: 18   SpO2: 96%   Weight: 83.6 kg (184 lb 4.9 oz)     BMI: Body mass index is 25.71 kg/m².    Physical Exam  Vitals and nursing note reviewed.   Constitutional:       Appearance: He is well-developed.   HENT:      Head: Normocephalic and atraumatic.   Eyes:      Pupils: Pupils are equal, round, and reactive to light.   Cardiovascular:      Rate and Rhythm: Normal rate and regular rhythm.   Pulmonary:      Effort: Pulmonary effort is normal.      Breath sounds: Normal breath sounds.   Abdominal:      General: Bowel sounds are normal.      Palpations: Abdomen is soft.   Musculoskeletal:         General: Normal range of motion.      Cervical back: Normal range of motion and neck supple.   Skin:     General: Skin is warm and dry.  "  Neurological:      Mental Status: He is alert and oriented to person, place, and time.   Psychiatric:         Behavior: Behavior normal.         Thought Content: Thought content normal.         Judgment: Judgment normal.         Laboratory Data:  Labs have been reviewed.    Lab Results   Component Value Date    WBC 8.21 12/20/2023    HGB 15.0 12/20/2023    HCT 44.5 12/20/2023    MCV 94 12/20/2023     12/20/2023       PSA, Screen (ng/mL)   Date Value   12/16/2014 1.2     PSA Total (ng/mL)   Date Value   12/04/2023 15.4 (H)     PSA, Free (ng/mL)   Date Value   12/04/2023 1.74 (H)     PSA, Free % (%)   Date Value   12/04/2023 11.30         Imaging:    PSMA scan (1/4/24): I have personally reviewed the images    Findings compatible with biopsy-proven prostate malignancy with bladder invasion.  Tracer-avid lesions concerning for supraclavicular, thoracic, abdominopelvic, and osseous metastases as detailed above.     Moderate left hydroureteronephrosis to the level of a 1.4 cm proximal ureteral stone.    MRI prostate (12/11/23):  Diffusely abnormal appearance of the prostate highly concerning for malignancy (PI-RADS 5).  Suspected involvement of the bilateral seminal vesicles.     Polypoid mass in the right anterolateral bladder wall concerning for involvement of the bladder wall, though primary bladder malignancy not excluded.     Suspicious right pelvic lymph node.      Assessment:       1. Prostate cancer    2. Elevated PSA    3. Prostate cancer metastatic to bone    4. 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 Hydronephrosis of left kidney    6. Secondary malignant neoplasm of bone    7. Hypertension associated with type 2 diabetes mellitus    8. Advance care planning           Plan:     Prostate cancer  - I have reviewed his chart  - presenting symptoms were nocturia, lower urinary tract symptoms  - MRI prostate (12/11/23) revealed "  Diffusely abnormal appearance of the " "prostate highly concerning for malignancy (PI-RADS 5).  Suspected involvement of the bilateral seminal vesicles" as well as a suspicious right pelvic lymph node.  - PSA (12/27/23) was elevated at 13 ng/mL, 26 when corrected for finasteride  - biopsy of prostate (12/27/23) revealed [place] 3+4=7 (Grade 2) prostate cancer in all biopsy sites.  - PSMA scan (1/4/24) revealed "biopsy-proven prostate malignancy with bladder invasion.  Tracer-avid lesions concerning for supraclavicular, thoracic, abdominopelvic, and osseous metastases."  - per Dr. Ervin's note dated 1/10/24:  "Needs cystoscopy and TURBT and TURP to de-obstruct him, also need ureteroscopy and laser lithotripsy and stent placement." He is scheduled for this procedure on 1/24/24.  - he began bicalutamide about 2 weeks ago.  - send TEMPUS testing on prostate biopsy specimen  - given the volume of disease, I recommend switching from bicalutamide to enzalutamide. I will also get eligard q3 months approved.  - recommend dental clearance in order to proceed with denosumab q12 weeks. We will discuss more at next visit.  - return to clinic in 4-6 weeks in preparation for first dose of eligard and to begin enzalutamide.    2. Hypertension  - mildly elevated blood pressure today.    3. Hydronephrosis  - per Dr. Ervin's note dated 1/10/24:  "Needs cystoscopy and TURBT and TURP to de-obstruct him, also need ureteroscopy and laser lithotripsy and stent placement." He is scheduled for this procedure on 2/2/24.    4. Advance Care Planning     Date: 01/18/2024    Power of   I initiated the process of voluntary advance care planning today and explained the importance of this process to the patient.  I introduced the concept of advance directives to the patient, as well. Then the patient received detailed information about the importance of designating a Health Care Power of  (HCPOA). He was also instructed to communicate with this person about their " wishes for future healthcare, should he become sick and lose decision-making capacity. The patient has not previously appointed a HCPOA. After our discussion, the patient has decided to complete a HCPOA and has appointed his  wife Taniya Marquez ([phone]) and friend Ernesto Peters ([phone]) . I spent a total time of 16 minutes discussing this issue with the patient.                - return to clinic in 4-6 weeks in preparation for first dose of eligard and to begin enzalutamide.    Yasir Chi M.D.  Hematology/Oncology  [facility] - [address], [address]  [place] [address]  Phone: [phone]  Fax: [phone]

## 2024-01-18 ENCOUNTER — OFFICE VISIT (OUTPATIENT)
Dept: HEMATOLOGY/ONCOLOGY | Facility: CLINIC | Age: 64
End: 2024-01-18
Payer: MEDICARE

## 2024-01-18 VITALS
DIASTOLIC BLOOD PRESSURE: 78 MMHG | SYSTOLIC BLOOD PRESSURE: 150 MMHG | OXYGEN SATURATION: 96 % | HEART RATE: 66 BPM | BODY MASS INDEX: 25.71 KG/M2 | RESPIRATION RATE: 18 BRPM | WEIGHT: 184.31 LBS

## 2024-01-18 DIAGNOSIS — C61 PROSTATE CANCER METASTATIC TO BONE: ICD-10-CM

## 2024-01-18 DIAGNOSIS — C79.51 SECONDARY MALIGNANT NEOPLASM OF BONE: ICD-10-CM

## 2024-01-18 DIAGNOSIS — C61 PROSTATE CANCER: Primary | ICD-10-CM

## 2024-01-18 DIAGNOSIS — R97.20 ELEVATED PSA: ICD-10-CM

## 2024-01-18 DIAGNOSIS — C79.51 PROSTATE CANCER METASTATIC TO BONE: ICD-10-CM

## 2024-01-18 DIAGNOSIS — N13.30 HYDRONEPHROSIS OF LEFT KIDNEY: ICD-10-CM

## 2024-01-18 DIAGNOSIS — E11.59 HYPERTENSION ASSOCIATED WITH TYPE 2 DIABETES MELLITUS: ICD-10-CM

## 2024-01-18 DIAGNOSIS — I15.2 HYPERTENSION ASSOCIATED WITH TYPE 2 DIABETES MELLITUS: ICD-10-CM

## 2024-01-18 DIAGNOSIS — C77.8 SECONDARY AND MALIGNANT NEOPLASM OF LYMPH NODES OF MULTIPLE SITES: ICD-10-CM

## 2024-01-18 DIAGNOSIS — Z71.89 ADVANCE CARE PLANNING: ICD-10-CM

## 2024-01-18 PROCEDURE — 99497 ADVNCD CARE PLAN 30 MIN: CPT | Mod: S$GLB,,, | Performed by: INTERNAL MEDICINE

## 2024-01-18 PROCEDURE — 1101F PT FALLS ASSESS-DOCD LE1/YR: CPT | Mod: CPTII,S$GLB,, | Performed by: INTERNAL MEDICINE

## 2024-01-18 PROCEDURE — 1159F MED LIST DOCD IN RCRD: CPT | Mod: CPTII,S$GLB,, | Performed by: INTERNAL MEDICINE

## 2024-01-18 PROCEDURE — 3008F BODY MASS INDEX DOCD: CPT | Mod: CPTII,S$GLB,, | Performed by: INTERNAL MEDICINE

## 2024-01-18 PROCEDURE — 3288F FALL RISK ASSESSMENT DOCD: CPT | Mod: CPTII,S$GLB,, | Performed by: INTERNAL MEDICINE

## 2024-01-18 PROCEDURE — 99999 PR PBB SHADOW E&M-EST. PATIENT-LVL IV: CPT | Mod: PBBFAC,,, | Performed by: INTERNAL MEDICINE

## 2024-01-18 PROCEDURE — 1158F ADVNC CARE PLAN TLK DOCD: CPT | Mod: CPTII,S$GLB,, | Performed by: INTERNAL MEDICINE

## 2024-01-18 PROCEDURE — 1160F RVW MEDS BY RX/DR IN RCRD: CPT | Mod: CPTII,S$GLB,, | Performed by: INTERNAL MEDICINE

## 2024-01-18 PROCEDURE — 3077F SYST BP >= 140 MM HG: CPT | Mod: CPTII,S$GLB,, | Performed by: INTERNAL MEDICINE

## 2024-01-18 PROCEDURE — 1126F AMNT PAIN NOTED NONE PRSNT: CPT | Mod: CPTII,S$GLB,, | Performed by: INTERNAL MEDICINE

## 2024-01-18 PROCEDURE — 99205 OFFICE O/P NEW HI 60 MIN: CPT | Mod: S$GLB,,, | Performed by: INTERNAL MEDICINE

## 2024-01-18 PROCEDURE — 3078F DIAST BP <80 MM HG: CPT | Mod: CPTII,S$GLB,, | Performed by: INTERNAL MEDICINE

## 2024-01-18 NOTE — PLAN OF CARE
DISCONTINUE ON PATHWAY REGIMEN - Prostate    POS98        Abiraterone acetate (conventional) (Zytiga)       Prednisone           Additional Orders: Abiraterone acetate (conventional) (Zytiga) and   abiraterone acetate (micronized) (Yonsa) are not interchangeable.    **Always confirm dose/schedule in your pharmacy ordering system**    REASON: Other Reason  PRIOR TREATMENT: POS98  TREATMENT RESPONSE: Unable to Evaluate    START ON PATHWAY REGIMEN - Prostate    POS99        Enzalutamide (Xtandi)     **Always confirm dose/schedule in your pharmacy ordering system**    Patient Characteristics:  Adenocarcinoma, Recurrent/New Systemic Disease (Including Biochemical   Recurrence), Non-Castrate, M1  Histology: Adenocarcinoma  Therapeutic Status: Recurrent/New Systemic Disease (Including Biochemical   Recurrence)  Intent of Therapy:  Non-Curative / Palliative Intent, Discussed with Patient

## 2024-01-18 NOTE — PLAN OF CARE
START ON PATHWAY REGIMEN - Prostate    POS98        Abiraterone acetate (conventional) (Zytiga)       Prednisone           Additional Orders: Abiraterone acetate (conventional) (Zytiga) and   abiraterone acetate (micronized) (Yonsa) are not interchangeable.    **Always confirm dose/schedule in your pharmacy ordering system**    Patient Characteristics:  Adenocarcinoma, Recurrent/New Systemic Disease (Including Biochemical   Recurrence), Non-Castrate, M1  Histology: Adenocarcinoma  Therapeutic Status: Recurrent/New Systemic Disease (Including Biochemical   Recurrence)  Intent of Therapy:  Non-Curative / Palliative Intent, Discussed with Patient

## 2024-01-22 ENCOUNTER — TELEPHONE (OUTPATIENT)
Dept: UROLOGY | Facility: CLINIC | Age: 64
End: 2024-01-22
Payer: MEDICARE

## 2024-01-22 NOTE — TELEPHONE ENCOUNTER
I called and left a VM for patient's friend Ernesto whom has accompanied pt for all appts and pt has used as an . I left a VM with the patient (via  Jory ID: [id number]) indicating that Dr. Ervin has an opening tomorrow for surgery if pt is available. UPDATE: 15:33 Ernesto returned my call and stated pt is not available for surgery tomorrow. He requests keep surgery on 1/24/24 as scheduled. Dr. Ervin notified.

## 2024-01-24 ENCOUNTER — HOSPITAL ENCOUNTER (OUTPATIENT)
Facility: HOSPITAL | Age: 64
Discharge: HOME | End: 2024-01-25
Attending: UROLOGY | Admitting: UROLOGY
Payer: MEDICARE

## 2024-01-24 ENCOUNTER — ANESTHESIA EVENT (OUTPATIENT)
Dept: SURGERY | Facility: HOSPITAL | Age: 64
End: 2024-01-24
Payer: MEDICARE

## 2024-01-24 ENCOUNTER — ANESTHESIA (OUTPATIENT)
Dept: SURGERY | Facility: HOSPITAL | Age: 64
End: 2024-01-24
Payer: MEDICARE

## 2024-01-24 DIAGNOSIS — Z01.818 PREOP EXAMINATION: ICD-10-CM

## 2024-01-24 DIAGNOSIS — R97.20 ELEVATED PSA: ICD-10-CM

## 2024-01-24 DIAGNOSIS — C79.51 PROSTATE CANCER METASTATIC TO BONE: ICD-10-CM

## 2024-01-24 DIAGNOSIS — C61 PROSTATE CANCER METASTATIC TO BONE: ICD-10-CM

## 2024-01-24 DIAGNOSIS — N20.1 LEFT URETERAL STONE: ICD-10-CM

## 2024-01-24 DIAGNOSIS — N20.1 LEFT URETERAL STONE: Primary | ICD-10-CM

## 2024-01-24 DIAGNOSIS — C61 PROSTATE CANCER: Primary | ICD-10-CM

## 2024-01-24 LAB
POCT GLUCOSE: 124 MG/DL (ref 70–110)
POCT GLUCOSE: 213 MG/DL (ref 70–110)
POCT GLUCOSE: 240 MG/DL (ref 70–110)

## 2024-01-24 PROCEDURE — 63600175 PHARM REV CODE 636 W HCPCS: Performed by: STUDENT IN AN ORGANIZED HEALTH CARE EDUCATION/TRAINING PROGRAM

## 2024-01-24 PROCEDURE — 74420 UROGRAPHY RTRGR +-KUB: CPT | Mod: 26,,, | Performed by: UROLOGY

## 2024-01-24 PROCEDURE — G0416 PROSTATE BIOPSY, ANY MTHD: HCPCS | Mod: 26,,, | Performed by: PATHOLOGY

## 2024-01-24 PROCEDURE — 52601 PROSTATECTOMY (TURP): CPT | Mod: 22,,, | Performed by: UROLOGY

## 2024-01-24 PROCEDURE — 63600175 PHARM REV CODE 636 W HCPCS: Performed by: UROLOGY

## 2024-01-24 PROCEDURE — 25000003 PHARM REV CODE 250: Performed by: UROLOGY

## 2024-01-24 PROCEDURE — 25000003 PHARM REV CODE 250: Performed by: NURSE ANESTHETIST, CERTIFIED REGISTERED

## 2024-01-24 PROCEDURE — 71000033 HC RECOVERY, INTIAL HOUR: Performed by: UROLOGY

## 2024-01-24 PROCEDURE — 63600175 PHARM REV CODE 636 W HCPCS: Performed by: NURSE ANESTHETIST, CERTIFIED REGISTERED

## 2024-01-24 PROCEDURE — 93005 ELECTROCARDIOGRAM TRACING: CPT | Mod: 59

## 2024-01-24 PROCEDURE — 37000008 HC ANESTHESIA 1ST 15 MINUTES: Performed by: UROLOGY

## 2024-01-24 PROCEDURE — 88305 TISSUE EXAM BY PATHOLOGIST: CPT | Performed by: PATHOLOGY

## 2024-01-24 PROCEDURE — 25000003 PHARM REV CODE 250: Performed by: STUDENT IN AN ORGANIZED HEALTH CARE EDUCATION/TRAINING PROGRAM

## 2024-01-24 PROCEDURE — 37000009 HC ANESTHESIA EA ADD 15 MINS: Performed by: UROLOGY

## 2024-01-24 PROCEDURE — 71000039 HC RECOVERY, EACH ADD'L HOUR: Performed by: UROLOGY

## 2024-01-24 PROCEDURE — 36000707: Performed by: UROLOGY

## 2024-01-24 PROCEDURE — 93010 ELECTROCARDIOGRAM REPORT: CPT | Mod: ,,, | Performed by: INTERNAL MEDICINE

## 2024-01-24 PROCEDURE — C1758 CATHETER, URETERAL: HCPCS | Performed by: UROLOGY

## 2024-01-24 PROCEDURE — C2617 STENT, NON-COR, TEM W/O DEL: HCPCS | Performed by: UROLOGY

## 2024-01-24 PROCEDURE — 87086 URINE CULTURE/COLONY COUNT: CPT | Performed by: UROLOGY

## 2024-01-24 PROCEDURE — D9220A PRA ANESTHESIA: Mod: ANES,,, | Performed by: STUDENT IN AN ORGANIZED HEALTH CARE EDUCATION/TRAINING PROGRAM

## 2024-01-24 PROCEDURE — 36000706: Performed by: UROLOGY

## 2024-01-24 PROCEDURE — 25500020 PHARM REV CODE 255: Performed by: UROLOGY

## 2024-01-24 PROCEDURE — D9220A PRA ANESTHESIA: Mod: CRNA,,, | Performed by: NURSE ANESTHETIST, CERTIFIED REGISTERED

## 2024-01-24 PROCEDURE — 52332 CYSTOSCOPY AND TREATMENT: CPT | Mod: 51,LT,, | Performed by: UROLOGY

## 2024-01-24 PROCEDURE — C1769 GUIDE WIRE: HCPCS | Performed by: UROLOGY

## 2024-01-24 PROCEDURE — 27201423 OPTIME MED/SURG SUP & DEVICES STERILE SUPPLY: Performed by: UROLOGY

## 2024-01-24 DEVICE — IMPLANTABLE DEVICE: Type: IMPLANTABLE DEVICE | Site: URETER | Status: FUNCTIONAL

## 2024-01-24 RX ORDER — LEVOTHYROXINE SODIUM 137 UG/1
137 TABLET ORAL DAILY
Status: DISCONTINUED | OUTPATIENT
Start: 2024-01-25 | End: 2024-01-25 | Stop reason: HOSPADM

## 2024-01-24 RX ORDER — PHENAZOPYRIDINE HYDROCHLORIDE 100 MG/1
200 TABLET, FILM COATED ORAL
Status: DISCONTINUED | OUTPATIENT
Start: 2024-01-24 | End: 2024-01-25 | Stop reason: HOSPADM

## 2024-01-24 RX ORDER — CEFAZOLIN SODIUM 2 G/50ML
2 SOLUTION INTRAVENOUS
Status: DISPENSED | OUTPATIENT
Start: 2024-01-24 | End: 2024-01-25

## 2024-01-24 RX ORDER — LIDOCAINE HYDROCHLORIDE 20 MG/ML
INJECTION INTRAVENOUS
Status: DISCONTINUED | OUTPATIENT
Start: 2024-01-24 | End: 2024-01-24

## 2024-01-24 RX ORDER — IBUPROFEN 200 MG
24 TABLET ORAL
Status: DISCONTINUED | OUTPATIENT
Start: 2024-01-24 | End: 2024-01-25 | Stop reason: HOSPADM

## 2024-01-24 RX ORDER — PROMETHAZINE HYDROCHLORIDE 25 MG/1
25 SUPPOSITORY RECTAL EVERY 6 HOURS PRN
Status: DISCONTINUED | OUTPATIENT
Start: 2024-01-24 | End: 2024-01-25 | Stop reason: HOSPADM

## 2024-01-24 RX ORDER — ACETAMINOPHEN 10 MG/ML
INJECTION, SOLUTION INTRAVENOUS
Status: DISCONTINUED | OUTPATIENT
Start: 2024-01-24 | End: 2024-01-24

## 2024-01-24 RX ORDER — GLUCAGON 1 MG
1 KIT INJECTION
Status: DISCONTINUED | OUTPATIENT
Start: 2024-01-24 | End: 2024-01-25 | Stop reason: HOSPADM

## 2024-01-24 RX ORDER — LOSARTAN POTASSIUM 25 MG/1
25 TABLET ORAL NIGHTLY
Status: DISCONTINUED | OUTPATIENT
Start: 2024-01-24 | End: 2024-01-25 | Stop reason: HOSPADM

## 2024-01-24 RX ORDER — LABETALOL HYDROCHLORIDE 5 MG/ML
10 INJECTION, SOLUTION INTRAVENOUS EVERY 4 HOURS PRN
Status: DISCONTINUED | OUTPATIENT
Start: 2024-01-24 | End: 2024-01-25 | Stop reason: HOSPADM

## 2024-01-24 RX ORDER — HYDROMORPHONE HYDROCHLORIDE 2 MG/ML
0.5 INJECTION, SOLUTION INTRAMUSCULAR; INTRAVENOUS; SUBCUTANEOUS EVERY 5 MIN PRN
Status: DISCONTINUED | OUTPATIENT
Start: 2024-01-24 | End: 2024-01-24 | Stop reason: HOSPADM

## 2024-01-24 RX ORDER — NEOSTIGMINE METHYLSULFATE 1 MG/ML
INJECTION, SOLUTION INTRAVENOUS
Status: DISCONTINUED | OUTPATIENT
Start: 2024-01-24 | End: 2024-01-24

## 2024-01-24 RX ORDER — CEFAZOLIN SODIUM 2 G/50ML
2 SOLUTION INTRAVENOUS
Status: DISCONTINUED | OUTPATIENT
Start: 2024-01-24 | End: 2024-01-24

## 2024-01-24 RX ORDER — BICALUTAMIDE 50 MG/1
50 TABLET, FILM COATED ORAL DAILY
Status: DISCONTINUED | OUTPATIENT
Start: 2024-01-25 | End: 2024-01-25 | Stop reason: HOSPADM

## 2024-01-24 RX ORDER — ONDANSETRON HYDROCHLORIDE 2 MG/ML
INJECTION, SOLUTION INTRAVENOUS
Status: DISCONTINUED | OUTPATIENT
Start: 2024-01-24 | End: 2024-01-24

## 2024-01-24 RX ORDER — OXYCODONE HYDROCHLORIDE 5 MG/1
5 TABLET ORAL
Status: DISCONTINUED | OUTPATIENT
Start: 2024-01-24 | End: 2024-01-24 | Stop reason: HOSPADM

## 2024-01-24 RX ORDER — HYDRALAZINE HYDROCHLORIDE 20 MG/ML
10 INJECTION INTRAMUSCULAR; INTRAVENOUS EVERY 8 HOURS PRN
Status: DISCONTINUED | OUTPATIENT
Start: 2024-01-24 | End: 2024-01-25 | Stop reason: HOSPADM

## 2024-01-24 RX ORDER — ONDANSETRON HYDROCHLORIDE 2 MG/ML
4 INJECTION, SOLUTION INTRAVENOUS DAILY PRN
Status: DISCONTINUED | OUTPATIENT
Start: 2024-01-24 | End: 2024-01-24 | Stop reason: HOSPADM

## 2024-01-24 RX ORDER — IBUPROFEN 200 MG
16 TABLET ORAL
Status: DISCONTINUED | OUTPATIENT
Start: 2024-01-24 | End: 2024-01-25 | Stop reason: HOSPADM

## 2024-01-24 RX ORDER — SODIUM CHLORIDE, SODIUM LACTATE, POTASSIUM CHLORIDE, CALCIUM CHLORIDE 600; 310; 30; 20 MG/100ML; MG/100ML; MG/100ML; MG/100ML
INJECTION, SOLUTION INTRAVENOUS CONTINUOUS
Status: DISCONTINUED | OUTPATIENT
Start: 2024-01-24 | End: 2024-01-25 | Stop reason: HOSPADM

## 2024-01-24 RX ORDER — SODIUM CHLORIDE 0.9 G/100ML
3000 IRRIGANT IRRIGATION CONTINUOUS
Status: DISPENSED | OUTPATIENT
Start: 2024-01-24 | End: 2024-01-24

## 2024-01-24 RX ORDER — FENTANYL CITRATE 50 UG/ML
INJECTION, SOLUTION INTRAMUSCULAR; INTRAVENOUS
Status: DISCONTINUED | OUTPATIENT
Start: 2024-01-24 | End: 2024-01-24

## 2024-01-24 RX ORDER — ACETAMINOPHEN 325 MG/1
650 TABLET ORAL EVERY 4 HOURS PRN
Status: DISCONTINUED | OUTPATIENT
Start: 2024-01-24 | End: 2024-01-25 | Stop reason: HOSPADM

## 2024-01-24 RX ORDER — SODIUM CHLORIDE 9 MG/ML
INJECTION, SOLUTION INTRAVENOUS CONTINUOUS
Status: DISCONTINUED | OUTPATIENT
Start: 2024-01-24 | End: 2024-01-24

## 2024-01-24 RX ORDER — PROPOFOL 10 MG/ML
VIAL (ML) INTRAVENOUS
Status: DISCONTINUED | OUTPATIENT
Start: 2024-01-24 | End: 2024-01-24

## 2024-01-24 RX ORDER — CEFAZOLIN SODIUM 1 G/3ML
INJECTION, POWDER, FOR SOLUTION INTRAMUSCULAR; INTRAVENOUS
Status: DISCONTINUED | OUTPATIENT
Start: 2024-01-24 | End: 2024-01-24

## 2024-01-24 RX ORDER — SODIUM CHLORIDE 0.9 G/100ML
3000 IRRIGANT IRRIGATION CONTINUOUS
Status: DISCONTINUED | OUTPATIENT
Start: 2024-01-24 | End: 2024-01-25 | Stop reason: HOSPADM

## 2024-01-24 RX ORDER — OXYBUTYNIN CHLORIDE 5 MG/1
5 TABLET ORAL 3 TIMES DAILY PRN
Status: DISCONTINUED | OUTPATIENT
Start: 2024-01-24 | End: 2024-01-25 | Stop reason: HOSPADM

## 2024-01-24 RX ORDER — TALC
6 POWDER (GRAM) TOPICAL NIGHTLY PRN
Status: DISCONTINUED | OUTPATIENT
Start: 2024-01-24 | End: 2024-01-25 | Stop reason: HOSPADM

## 2024-01-24 RX ORDER — INSULIN ASPART 100 [IU]/ML
0-5 INJECTION, SOLUTION INTRAVENOUS; SUBCUTANEOUS
Status: DISCONTINUED | OUTPATIENT
Start: 2024-01-24 | End: 2024-01-25 | Stop reason: HOSPADM

## 2024-01-24 RX ORDER — EPHEDRINE SULFATE 50 MG/ML
INJECTION, SOLUTION INTRAVENOUS
Status: DISCONTINUED | OUTPATIENT
Start: 2024-01-24 | End: 2024-01-24

## 2024-01-24 RX ORDER — DEXAMETHASONE SODIUM PHOSPHATE 4 MG/ML
INJECTION, SOLUTION INTRA-ARTICULAR; INTRALESIONAL; INTRAMUSCULAR; INTRAVENOUS; SOFT TISSUE
Status: DISCONTINUED | OUTPATIENT
Start: 2024-01-24 | End: 2024-01-24

## 2024-01-24 RX ORDER — MIDAZOLAM HYDROCHLORIDE 1 MG/ML
INJECTION INTRAMUSCULAR; INTRAVENOUS
Status: DISCONTINUED | OUTPATIENT
Start: 2024-01-24 | End: 2024-01-24

## 2024-01-24 RX ORDER — HYDROCODONE BITARTRATE AND ACETAMINOPHEN 5; 325 MG/1; MG/1
1 TABLET ORAL EVERY 4 HOURS PRN
Status: DISCONTINUED | OUTPATIENT
Start: 2024-01-24 | End: 2024-01-25 | Stop reason: HOSPADM

## 2024-01-24 RX ORDER — ATORVASTATIN CALCIUM 20 MG/1
20 TABLET, FILM COATED ORAL NIGHTLY
Status: DISCONTINUED | OUTPATIENT
Start: 2024-01-24 | End: 2024-01-25 | Stop reason: HOSPADM

## 2024-01-24 RX ORDER — ROCURONIUM BROMIDE 10 MG/ML
INJECTION, SOLUTION INTRAVENOUS
Status: DISCONTINUED | OUTPATIENT
Start: 2024-01-24 | End: 2024-01-24

## 2024-01-24 RX ORDER — ONDANSETRON HYDROCHLORIDE 2 MG/ML
4 INJECTION, SOLUTION INTRAVENOUS EVERY 12 HOURS PRN
Status: DISCONTINUED | OUTPATIENT
Start: 2024-01-24 | End: 2024-01-25 | Stop reason: HOSPADM

## 2024-01-24 RX ORDER — HYDROMORPHONE HYDROCHLORIDE 2 MG/ML
1 INJECTION, SOLUTION INTRAMUSCULAR; INTRAVENOUS; SUBCUTANEOUS EVERY 4 HOURS PRN
Status: DISCONTINUED | OUTPATIENT
Start: 2024-01-24 | End: 2024-01-25 | Stop reason: HOSPADM

## 2024-01-24 RX ADMIN — SODIUM CHLORIDE, POTASSIUM CHLORIDE, SODIUM LACTATE AND CALCIUM CHLORIDE: 600; 310; 30; 20 INJECTION, SOLUTION INTRAVENOUS at 06:01

## 2024-01-24 RX ADMIN — NEOSTIGMINE METHYLSULFATE 4 MG: 1 INJECTION INTRAVENOUS at 03:01

## 2024-01-24 RX ADMIN — HYDROMORPHONE HYDROCHLORIDE 0.5 MG: 2 INJECTION, SOLUTION INTRAMUSCULAR; INTRAVENOUS; SUBCUTANEOUS at 04:01

## 2024-01-24 RX ADMIN — LIDOCAINE HYDROCHLORIDE 100 MG: 20 INJECTION, SOLUTION INTRAVENOUS at 01:01

## 2024-01-24 RX ADMIN — CEFAZOLIN 2 G: 330 INJECTION, POWDER, FOR SOLUTION INTRAMUSCULAR; INTRAVENOUS at 01:01

## 2024-01-24 RX ADMIN — ONDANSETRON 4 MG: 2 INJECTION, SOLUTION INTRAMUSCULAR; INTRAVENOUS at 03:01

## 2024-01-24 RX ADMIN — SODIUM CHLORIDE 3000 ML: 900 IRRIGANT IRRIGATION at 10:01

## 2024-01-24 RX ADMIN — DEXAMETHASONE SODIUM PHOSPHATE 8 MG: 4 INJECTION, SOLUTION INTRA-ARTICULAR; INTRALESIONAL; INTRAMUSCULAR; INTRAVENOUS; SOFT TISSUE at 01:01

## 2024-01-24 RX ADMIN — EPHEDRINE SULFATE 10 MG: 50 INJECTION, SOLUTION INTRAMUSCULAR; INTRAVENOUS; SUBCUTANEOUS at 01:01

## 2024-01-24 RX ADMIN — ACETAMINOPHEN 1000 MG: 10 INJECTION, SOLUTION INTRAVENOUS at 01:01

## 2024-01-24 RX ADMIN — CEFAZOLIN SODIUM 2 G: 2 SOLUTION INTRAVENOUS at 11:01

## 2024-01-24 RX ADMIN — SODIUM CHLORIDE, SODIUM LACTATE, POTASSIUM CHLORIDE, AND CALCIUM CHLORIDE: .6; .31; .03; .02 INJECTION, SOLUTION INTRAVENOUS at 02:01

## 2024-01-24 RX ADMIN — LOSARTAN POTASSIUM 25 MG: 25 TABLET, FILM COATED ORAL at 08:01

## 2024-01-24 RX ADMIN — OXYBUTYNIN CHLORIDE 5 MG: 5 TABLET ORAL at 03:01

## 2024-01-24 RX ADMIN — INSULIN ASPART 2 UNITS: 100 INJECTION, SOLUTION INTRAVENOUS; SUBCUTANEOUS at 07:01

## 2024-01-24 RX ADMIN — SODIUM CHLORIDE 3000 ML: 900 IRRIGANT IRRIGATION at 08:01

## 2024-01-24 RX ADMIN — GLYCOPYRROLATE 0.4 MG: 0.2 INJECTION, SOLUTION INTRAMUSCULAR; INTRAVITREAL at 03:01

## 2024-01-24 RX ADMIN — OXYCODONE HYDROCHLORIDE 5 MG: 5 TABLET ORAL at 03:01

## 2024-01-24 RX ADMIN — ONDANSETRON 4 MG: 2 INJECTION, SOLUTION INTRAMUSCULAR; INTRAVENOUS at 01:01

## 2024-01-24 RX ADMIN — ROCURONIUM BROMIDE 50 MG: 10 INJECTION, SOLUTION INTRAVENOUS at 01:01

## 2024-01-24 RX ADMIN — INSULIN ASPART 1 UNITS: 100 INJECTION, SOLUTION INTRAVENOUS; SUBCUTANEOUS at 08:01

## 2024-01-24 RX ADMIN — SODIUM CHLORIDE, SODIUM LACTATE, POTASSIUM CHLORIDE, AND CALCIUM CHLORIDE: .6; .31; .03; .02 INJECTION, SOLUTION INTRAVENOUS at 12:01

## 2024-01-24 RX ADMIN — HYDROMORPHONE HYDROCHLORIDE 0.5 MG: 2 INJECTION, SOLUTION INTRAMUSCULAR; INTRAVENOUS; SUBCUTANEOUS at 03:01

## 2024-01-24 RX ADMIN — PROPOFOL 150 MG: 10 INJECTION, EMULSION INTRAVENOUS at 01:01

## 2024-01-24 RX ADMIN — SODIUM CHLORIDE 3000 ML: 900 IRRIGANT IRRIGATION at 07:01

## 2024-01-24 RX ADMIN — SODIUM CHLORIDE 3000 ML: 900 IRRIGANT IRRIGATION at 11:01

## 2024-01-24 RX ADMIN — FENTANYL CITRATE 100 MCG: 50 INJECTION, SOLUTION INTRAMUSCULAR; INTRAVENOUS at 01:01

## 2024-01-24 RX ADMIN — ATORVASTATIN CALCIUM 20 MG: 20 TABLET, FILM COATED ORAL at 08:01

## 2024-01-24 RX ADMIN — MIDAZOLAM HYDROCHLORIDE 2 MG: 1 INJECTION INTRAMUSCULAR; INTRAVENOUS at 01:01

## 2024-01-24 NOTE — ANESTHESIA PROCEDURE NOTES
Intubation    Date/Time: 1/24/2024 1:31 PM    Performed by: Blanca Garcia CRNA  Authorized by: Conrad Shah MD    Intubation:     Induction:  Intravenous    Intubated:  Postinduction    Mask Ventilation:  Easy with oral airway    Attempts:  1    Attempted By:  Student    Method of Intubation:  Direct    Blade:  Bree 3    Laryngeal View Grade: Grade I - full view of cords      Difficult Airway Encountered?: No      Airway Device:  Oral endotracheal tube    Airway Device Size:  8.0    Style/Cuff Inflation:  Cuffed (inflated to minimal occlusive pressure)    Tube secured:  22    Secured at:  The teeth    Placement Verified By:  Capnometry and Revisualization with laryngoscopy    Complicating Factors:  None    Findings Post-Intubation:  BS equal bilateral and atraumatic/condition of teeth unchanged

## 2024-01-24 NOTE — ANESTHESIA PREPROCEDURE EVALUATION
01/24/2024  Waldemar Marquez is a 63 y.o., male.      Pre-op Assessment    I have reviewed the Patient Summary Reports.     I have reviewed the Nursing Notes. I have reviewed the NPO Status.      Review of Systems  Anesthesia Hx:   History of prior surgery of interest to airway management or planning:            Denies Personal Hx of Anesthesia complications.                    Cardiovascular:     Hypertension                                        Renal/:  Chronic Renal Disease                Endocrine:  Diabetes Hypothyroidism              Physical Exam  General: Well nourished    Airway:  Mallampati: II   Mouth Opening: Normal  Neck ROM: Normal ROM        Anesthesia Plan  Type of Anesthesia, risks & benefits discussed:    Anesthesia Type: Gen ETT  Informed Consent: Informed consent signed with the Patient and all parties understand the risks and agree with anesthesia plan.  All questions answered.   ASA Score: 3    Ready For Surgery From Anesthesia Perspective.     .

## 2024-01-24 NOTE — OP NOTE
[facility] - Surgery ([facility])  Operative Note    Date: 01/24/2024    Pre-Op Diagnosis: Prostate Cancer with bladder outlet obstruction, left ureteral stone    Post-Op Diagnosis: Prostate cancer with bladder outlet obstruction, left ureteral stone    Procedure(s) Performed:   Bipolar TURP (Channel TURP):  Complex 22 modifier  Left ureteral stent insertion  Left retrograde pyelography with on table fluoroscopic interpretation by physician  Empty placement of Valdez catheter    Justification for complex 22 modifier:  This patient is prostate cancer made the Transurethral resection of the prostate longer than typical and required a significant amount of technical complexity and difficulty.    Specimen(s): prostate chips, upper tract urine for culture    Staff Surgeon: Reinaldo Ervin MD    Assistant Surgeon: none    Anesthesia: General endotracheal anesthesia    Findings:   Left proximal ureteral stone seen on retrograde pyelography with severe hydronephrosis  Intravesical growth of the patient's prostate cancer at the posterior aspect of the bladder neck and the anterior aspect of the bladder neck.    Estimated Blood Loss: not measurable    Drains: 22 Fr 3-way valdez catheter, 6x28 JJ left ureteral stent    Indications: Waldemar Marquez is a 63 y.o. male with LUTS, was subsequently found to have metastatic prostate cancer.  Patient had bothersome lower urinary tract symptoms and cystoscopy revealed intravesical local growth of his metastatic prostate cancer.  A PSMA PET scan revealed that he had osseous Mets as well as a proximal left ureteral stone.  Options were discussed.  Consents were obtained.  He was planned for a Transurethral resection of the prostate with a left ureteral stent placement.    Procedure in detail:  Antibiotics were administered and SCDs were placed. The patient was brought to OR and placed in supine position. Anesthesia was smoothly induced, the patient was placed in dorsal lithotomy position, and he was  then prepped and draped in sterile surgical fashion. A timeout was performed.    We initially began by introducing the cystoscope.  The anterior urethra was unremarkable.  The prostatic urethra revealed bilobar hyperplasia with a significant amount of abnormal neoplastic growth into the prostatic urethra and then extending at the posterior aspect of the urethra into the base of the bladder, as well as at the anterior aspect of the bladder neck.  The bladder was thoroughly inspected and no other tumors were seen.  A  film was obtained.    We then cannulated the left ureteral orifice with a wire.  This was passed up to the proximal ureter where we reach some resistance with the impacted stone.  Using a ureteral catheter we were able to navigate the wire around the stone subsequently we navigated the catheter around the stone.  We obtain an upper tract urine for culture.  A retrograde pyelography was performed.  Isovue contrast was instilled.  The collecting system was opacified.  The kidney was found to be severely hydronephrotic.  A wire was replaced and a 6 Lebanese by 28 cm double-J left ureteral stent was placed over the wire and confirmed to be in good position.  The cystoscope was removed and the resectoscope was then inserted.    We began by using the 26 Thai resectoscope with the visual obturator and 30 degree lens and inserting it into the bladder. The urethra was as described above. Both UOs were visualized. The bladder was inspected, no other abnormalities seen.    We then inserted the bipolar loop with the working element and performed a TURP. Channels were made at the 5 and 7 o'clock positions. The proximal margin of resection was the bladder neck, the distal margin was the verumontanum. The depth of resection was the prostatic capsule. The median lobe was resected first after our channels were made. We then resected both the left and right lateral lobes from bottom to top, keeping the depth at the  capsule. All anterior hypertrophic tissue was resected.  All of the abnormal neoplastic tissue that was protruding from the bladder neck into the base of the bladder was also resected.  This required a prolonged amount of time and technical difficulty which requires the addition of a complex 22 modifier.    Once this was completed, we irrigated out all chips. We inserted the plasma button to achieve hemostasis, which was done without issue. The bladder was then decompressed and the fossa was reinspected and found to be open and hemostatic. The resectoscope was removed.    A 22  Tamazight 3-way catheter was placed with good urine return, the balloon was filled with 30 cc of water, and it was connected to CBI and gravity drainage. The patient was awoken from anesthesia and brought to PACU in stable condition. All counts were correct.    Disposition: The patient will admitted overnight and CBI will be titrated.    Reinaldo Ervin MD  Urology  [facility]    Disclaimer: This note has been generated using voice-recognition software. There may be typographical errors that have been missed during proof-reading.

## 2024-01-24 NOTE — TRANSFER OF CARE
"Anesthesia Transfer of Care Note    Patient: Waldemar Marquez    Procedure(s) Performed: Procedure(s) (LRB):  TURP (TRANSURETHRAL RESECTION OF PROSTATE) (N/A)  CYSTOSCOPY, WITH RETROGRADE PYELOGRAM AND URETERAL STENT INSERTION (Left)    Patient location: PACU    Anesthesia Type: general    Transport from OR: Transported from OR on 6-10 L/min O2 by face mask with adequate spontaneous ventilation    Post pain: adequate analgesia    Post assessment: no apparent anesthetic complications    Post vital signs: stable    Level of consciousness: awake, alert and oriented    Nausea/Vomiting: no nausea/vomiting    Complications: none    Transfer of care protocol was followed      Last vitals: Visit Vitals  /67   Pulse 65   Temp 36.5 °C (97.7 °F) (Skin)   Resp 17   Ht 5' 11" (1.803 m)   Wt 83 kg (183 lb)   SpO2 95%   BMI 25.52 kg/m²     "

## 2024-01-24 NOTE — ANESTHESIA POSTPROCEDURE EVALUATION
Anesthesia Post Evaluation    Patient: Waldemar Marquez    Procedure(s) Performed: Procedure(s) (LRB):  TURP (TRANSURETHRAL RESECTION OF PROSTATE) (N/A)  CYSTOSCOPY, WITH RETROGRADE PYELOGRAM AND URETERAL STENT INSERTION (Left)    Final Anesthesia Type: general      Patient location during evaluation: PACU  Patient participation: Yes- Able to Participate  Level of consciousness: awake and alert  Post-procedure vital signs: reviewed and stable  Pain management: adequate  Airway patency: patent    PONV status at discharge: No PONV  Anesthetic complications: no      Cardiovascular status: stable  Respiratory status: room air  Hydration status: euvolemic  Follow-up not needed.              Vitals Value Taken Time   /74 01/24/24 1651   Temp 36.5 °C (97.7 °F) 01/24/24 1515   Pulse 52 01/24/24 1654   Resp 22 01/24/24 1654   SpO2 97 % 01/24/24 1654   Vitals shown include unvalidated device data.      No case tracking events are documented in the log.      Pain/Ian Score: Pain Rating Prior to Med Admin: 6 (1/24/2024  4:36 PM)

## 2024-01-25 VITALS
RESPIRATION RATE: 18 BRPM | TEMPERATURE: 98 F | WEIGHT: 198.63 LBS | SYSTOLIC BLOOD PRESSURE: 136 MMHG | OXYGEN SATURATION: 95 % | HEIGHT: 71 IN | DIASTOLIC BLOOD PRESSURE: 67 MMHG | HEART RATE: 57 BPM | BODY MASS INDEX: 27.81 KG/M2

## 2024-01-25 LAB
ANION GAP SERPL CALC-SCNC: 12 MMOL/L (ref 8–16)
BACTERIA UR CULT: NO GROWTH
BASOPHILS # BLD AUTO: 0.02 K/UL (ref 0–0.2)
BASOPHILS NFR BLD: 0.2 % (ref 0–1.9)
BUN SERPL-MCNC: 26 MG/DL (ref 8–23)
CALCIUM SERPL-MCNC: 8.9 MG/DL (ref 8.7–10.5)
CHLORIDE SERPL-SCNC: 103 MMOL/L (ref 95–110)
CO2 SERPL-SCNC: 20 MMOL/L (ref 23–29)
CREAT SERPL-MCNC: 1.3 MG/DL (ref 0.5–1.4)
DIFFERENTIAL METHOD BLD: ABNORMAL
EOSINOPHIL # BLD AUTO: 0 K/UL (ref 0–0.5)
EOSINOPHIL NFR BLD: 0 % (ref 0–8)
ERYTHROCYTE [DISTWIDTH] IN BLOOD BY AUTOMATED COUNT: 12.9 % (ref 11.5–14.5)
EST. GFR  (NO RACE VARIABLE): >60 ML/MIN/1.73 M^2
GLUCOSE SERPL-MCNC: 185 MG/DL (ref 70–110)
HCT VFR BLD AUTO: 40.4 % (ref 40–54)
HGB BLD-MCNC: 13.7 G/DL (ref 14–18)
IMM GRANULOCYTES # BLD AUTO: 0.03 K/UL (ref 0–0.04)
IMM GRANULOCYTES NFR BLD AUTO: 0.3 % (ref 0–0.5)
LYMPHOCYTES # BLD AUTO: 1.2 K/UL (ref 1–4.8)
LYMPHOCYTES NFR BLD: 13.1 % (ref 18–48)
MCH RBC QN AUTO: 31.1 PG (ref 27–31)
MCHC RBC AUTO-ENTMCNC: 33.9 G/DL (ref 32–36)
MCV RBC AUTO: 92 FL (ref 82–98)
MONOCYTES # BLD AUTO: 0.6 K/UL (ref 0.3–1)
MONOCYTES NFR BLD: 6.7 % (ref 4–15)
NEUTROPHILS # BLD AUTO: 7.4 K/UL (ref 1.8–7.7)
NEUTROPHILS NFR BLD: 79.7 % (ref 38–73)
NRBC BLD-RTO: 0 /100 WBC
PLATELET # BLD AUTO: 220 K/UL (ref 150–450)
PMV BLD AUTO: 9.7 FL (ref 9.2–12.9)
POCT GLUCOSE: 158 MG/DL (ref 70–110)
POCT GLUCOSE: 193 MG/DL (ref 70–110)
POTASSIUM SERPL-SCNC: 4.6 MMOL/L (ref 3.5–5.1)
RBC # BLD AUTO: 4.4 M/UL (ref 4.6–6.2)
SODIUM SERPL-SCNC: 135 MMOL/L (ref 136–145)
WBC # BLD AUTO: 9.3 K/UL (ref 3.9–12.7)

## 2024-01-25 PROCEDURE — 94761 N-INVAS EAR/PLS OXIMETRY MLT: CPT

## 2024-01-25 PROCEDURE — 99900035 HC TECH TIME PER 15 MIN (STAT)

## 2024-01-25 PROCEDURE — 85025 COMPLETE CBC W/AUTO DIFF WBC: CPT | Performed by: UROLOGY

## 2024-01-25 PROCEDURE — 80048 BASIC METABOLIC PNL TOTAL CA: CPT | Performed by: UROLOGY

## 2024-01-25 PROCEDURE — 25000003 PHARM REV CODE 250: Performed by: UROLOGY

## 2024-01-25 PROCEDURE — 94799 UNLISTED PULMONARY SVC/PX: CPT | Mod: XB

## 2024-01-25 PROCEDURE — 36415 COLL VENOUS BLD VENIPUNCTURE: CPT | Performed by: UROLOGY

## 2024-01-25 PROCEDURE — 63600175 PHARM REV CODE 636 W HCPCS: Performed by: UROLOGY

## 2024-01-25 RX ORDER — OXYCODONE HYDROCHLORIDE 5 MG/1
5 TABLET ORAL EVERY 4 HOURS PRN
Qty: 5 TABLET | Refills: 0 | Status: ON HOLD | OUTPATIENT
Start: 2024-01-25 | End: 2024-02-21 | Stop reason: SDUPTHER

## 2024-01-25 RX ORDER — ACETAMINOPHEN 500 MG
1000 TABLET ORAL EVERY 8 HOURS
Qty: 42 TABLET | Refills: 0 | Status: SHIPPED | OUTPATIENT
Start: 2024-01-25 | End: 2024-02-01

## 2024-01-25 RX ORDER — PHENAZOPYRIDINE HYDROCHLORIDE 100 MG/1
100 TABLET, FILM COATED ORAL
Qty: 30 TABLET | Refills: 0 | Status: SHIPPED | OUTPATIENT
Start: 2024-01-25 | End: 2024-02-04

## 2024-01-25 RX ADMIN — SODIUM CHLORIDE 3000 ML: 900 IRRIGANT IRRIGATION at 01:01

## 2024-01-25 RX ADMIN — CEFAZOLIN SODIUM 2 G: 2 SOLUTION INTRAVENOUS at 06:01

## 2024-01-25 RX ADMIN — LEVOTHYROXINE SODIUM 137 MCG: 137 TABLET ORAL at 09:01

## 2024-01-25 RX ADMIN — PHENAZOPYRIDINE HYDROCHLORIDE 200 MG: 100 TABLET ORAL at 12:01

## 2024-01-25 RX ADMIN — BICALUTAMIDE 50 MG: 50 TABLET, FILM COATED ORAL at 09:01

## 2024-01-25 RX ADMIN — PHENAZOPYRIDINE HYDROCHLORIDE 200 MG: 100 TABLET ORAL at 06:01

## 2024-01-25 RX ADMIN — SODIUM CHLORIDE, POTASSIUM CHLORIDE, SODIUM LACTATE AND CALCIUM CHLORIDE: 600; 310; 30; 20 INJECTION, SOLUTION INTRAVENOUS at 02:01

## 2024-01-25 RX ADMIN — SODIUM CHLORIDE 3000 ML: 900 IRRIGANT IRRIGATION at 03:01

## 2024-01-25 NOTE — NURSING
Nena Isabel # [id number].  Fill and pull performed on patient.  Instilled 120 ml into urinary bladder.  Madden balloon deflated with 20 cc out of balloon.  Madden removed completely intact from urinary bladder.   Patient tolerated well.  Voided red urine of 75 ml in urinal.  Patient voiced understanding of plan.  No further questions.

## 2024-01-25 NOTE — SUBJECTIVE & OBJECTIVE
"Interval History: NAEON. Pain well controlled. CBI clamped at 0700.       Objective:     Temp:  [97.3 °F (36.3 °C)-98.4 °F (36.9 °C)] 98.4 °F (36.9 °C)  Pulse:  [50-73] 63  Resp:  [13-21] 18  SpO2:  [92 %-99 %] 96 %  BP: (106-144)/(55-77) 132/61     Body mass index is 27.7 kg/m².           Drains       Drain  Duration                  Urethral Catheter 01/24/24 1500 Non-latex;Straight-tip 22 Fr. <1 day                     Physical Exam  Vitals reviewed.   Constitutional:       General: He is not in acute distress.     Appearance: He is well-developed. He is not diaphoretic.   HENT:      Head: Normocephalic and atraumatic.   Eyes:      General: No scleral icterus.  Cardiovascular:      Rate and Rhythm: Normal rate.   Pulmonary:      Effort: Pulmonary effort is normal. No respiratory distress.      Breath sounds: No stridor.   Abdominal:      General: There is no distension.      Palpations: Abdomen is soft.      Tenderness: There is no abdominal tenderness.   Genitourinary:     Comments: Madden catheter draining thin red, pyridium stained urine, clears immediately with CBI  Musculoskeletal:         General: Normal range of motion.      Cervical back: Normal range of motion.   Skin:     General: Skin is warm and dry.   Neurological:      Mental Status: He is alert.   Psychiatric:         Behavior: Behavior normal.           Significant Labs:    BMP:  Recent Labs   Lab 01/25/24  0439   *   K 4.6      CO2 20*   BUN 26*   CREATININE 1.3   CALCIUM 8.9       CBC:   Recent Labs   Lab 01/25/24  0439   WBC 9.30   HGB 13.7*   HCT 40.4          Blood Culture: No results for input(s): "LABBLOO" in the last 168 hours.  Urine Culture: No results for input(s): "LABURIN" in the last 168 hours.  Urine Studies: No results for input(s): "COLORU", "APPEARANCEUA", "PHUR", "SPECGRAV", "PROTEINUA", "GLUCUA", "KETONESU", "BILIRUBINUA", "OCCULTUA", "NITRITE", "UROBILINOGEN", "LEUKOCYTESUR", "RBCUA", "WBCUA", "BACTERIA", " ""SQUAMEPITHEL", "HYALINECASTS" in the last 168 hours.    Invalid input(s): "WRIGHTSUR"    Significant Imaging:  All pertinent imaging results/findings from the past 24 hours have been reviewed.                "

## 2024-01-25 NOTE — PROGRESS NOTES
[facility] Surg  Urology  Progress Note    Patient Name: Waldemar Marquez  MRN: [id number]  Admission Date: 1/24/2024  Hospital Length of Stay: 0 days  Code Status: No Order   Attending Provider: Reinaldo Ervin MD   Primary Care Physician: Pawan Garza MD    Subjective:     HPI:  Waldemar Marquez is a 64 yo M with a history of prostate cancer and LUTS s/p L ureteral stent placement, TURP on 1/24/24.    Interval History: NAEON. Pain well controlled. CBI clamped at 0700.       Objective:     Temp:  [97.3 °F (36.3 °C)-98.4 °F (36.9 °C)] 98.4 °F (36.9 °C)  Pulse:  [50-73] 63  Resp:  [13-21] 18  SpO2:  [92 %-99 %] 96 %  BP: (106-144)/(55-77) 132/61     Body mass index is 27.7 kg/m².           Drains       Drain  Duration                  Urethral Catheter 01/24/24 1500 Non-latex;Straight-tip 22 Fr. <1 day                     Physical Exam  Vitals reviewed.   Constitutional:       General: He is not in acute distress.     Appearance: He is well-developed. He is not diaphoretic.   HENT:      Head: Normocephalic and atraumatic.   Eyes:      General: No scleral icterus.  Cardiovascular:      Rate and Rhythm: Normal rate.   Pulmonary:      Effort: Pulmonary effort is normal. No respiratory distress.      Breath sounds: No stridor.   Abdominal:      General: There is no distension.      Palpations: Abdomen is soft.      Tenderness: There is no abdominal tenderness.   Genitourinary:     Comments: Madden catheter draining thin red, pyridium stained urine, clears immediately with CBI  Musculoskeletal:         General: Normal range of motion.      Cervical back: Normal range of motion.   Skin:     General: Skin is warm and dry.   Neurological:      Mental Status: He is alert.   Psychiatric:         Behavior: Behavior normal.           Significant Labs:    BMP:  Recent Labs   Lab 01/25/24  0439   *   K 4.6      CO2 20*   BUN 26*   CREATININE 1.3   CALCIUM 8.9       CBC:   Recent Labs   Lab 01/25/24  0439   WBC 9.30   HGB 13.7*  "  HCT 40.4          Blood Culture: No results for input(s): "LABBLOO" in the last 168 hours.  Urine Culture: No results for input(s): "LABURIN" in the last 168 hours.  Urine Studies: No results for input(s): "COLORU", "APPEARANCEUA", "PHUR", "SPECGRAV", "PROTEINUA", "GLUCUA", "KETONESU", "BILIRUBINUA", "OCCULTUA", "NITRITE", "UROBILINOGEN", "LEUKOCYTESUR", "RBCUA", "WBCUA", "BACTERIA", "SQUAMEPITHEL", "HYALINECASTS" in the last 168 hours.    Invalid input(s): "WRIGHTSUR"    Significant Imaging:  All pertinent imaging results/findings from the past 24 hours have been reviewed.                  Assessment/Plan:     Prostate cancer  - Continue regular diet  - Maintain SCDs  - Continue prn pain and nausea control  - Continue to encourage ambulation  - Continue IS use  - pyridium TID, oxybutynin prn  - remove valdez this AM, voiding trial to follow  Dispo: home today pending voiding trial          VTE Risk Mitigation (From admission, onward)      None            Chris Barrett MD  Urology  [facility] Surg  "

## 2024-01-25 NOTE — ASSESSMENT & PLAN NOTE
- Continue regular diet  - Maintain SCDs  - Continue prn pain and nausea control  - Continue to encourage ambulation  - Continue IS use  - pyridium TID, oxybutynin prn  - remove valdez this AM, voiding trial to follow  Dispo: home today pending voiding trial

## 2024-01-25 NOTE — PROGRESS NOTES
01/24/24 1845   Admission   Initial VN Admission Questions Complete   Communication Issues? None  (Speaks Mandarin)   Shift   Virtual Nurse - Rounding Complete   Virtual Nurse - Patient Verbalized Approval Of Camera Use   Safety/Activity   Patient Rounds bed in low position;bed wheels locked;visualized patient   Safety Promotion/Fall Prevention family to remain at bedside;side rails raised x 2;instructed to call staff for mobility   Positioning   Head of Bed (HOB) Positioning HOB at 30-45 degrees   Positioning/Transfer Devices pillows;in use   Pain/Comfort/Sleep   Comfort/Acceptable Pain Level 3   Pain Rating (0-10): Rest 0     VN cued into room and permission given to adjust the camera towards patient.  Patient is resting in bed with wife at the bedside.  Patient speaks Mandarin.  Admission questions completed.  Plan of care reviewed.  Safety is maintained with bed low and call light within reach.  Instructed patient to call for any assistance and patient verbalized understanding.

## 2024-01-25 NOTE — HPI
Waldemar Marquez is a 64 yo M with a history of prostate cancer and LUTS s/p L ureteral stent placement, TURP on 1/24/24.

## 2024-01-25 NOTE — PLAN OF CARE
[place] [facility] Surg  Initial Discharge Assessment       Primary Care Provider: Pawan Garza MD    Admission Diagnosis: Prostate cancer metastatic to bone [C61, C79.51]  Elevated PSA [R97.20]  Left ureteral stone [N20.1]    Admission Date: 1/24/2024  Expected Discharge Date: 1/25/2024    DCA done with pt with [employer]  #[id number]. Pt [place] spouse in [place] NO DME. No HH. Family to transport at [place]. F/U with Uro requested.     Payor: HU[employer] MANAGED MEDICARE / Plan: HU[employer] MEDICARE PPO / Product Type: Medicare Advantage /     Extended Emergency Contact Information  Primary Emergency Contact: Mary Marquez  Address:  [address] [address] [facility]  Home Phone: [phone]  Mobile Phone: [phone]  Relation: Spouse  Secondary Emergency Contact: Taniya Marquez  Address:  [place] [address] [place]  Mobile Phone: [phone]  Relation: Spouse    Discharge Plan A: (P) Home, Home with family         [address] [facility] [address] [id number] [facility]  [place] [address]  Phone: [phone] Fax: [phone]       01/25/24 1223   Discharge Planning   Assessment Type Discharge Planning Brief Assessment   Resource/Environmental Concerns none   Support Systems Spouse/significant other   Equipment Currently Used at Home none   Current Living Arrangements home   Patient/Family Anticipates Transition to home   Patient/Family Anticipated Services at Transition none   DME Needed Upon Discharge  none   Discharge Plan A Home;Home with family       Future Appointments   Date Time Provider Department Center   2/8/2024  8:00 AM APPOINTMENT [facility] [place] [facility]   2/8/2024  9:00 AM Yasir Chi MD [facility] HEM ONC [place] [facility]   2/8/2024  9:30 AM CHAIR 05 [facility] CHEMO [place] [facility]   3/4/2024  2:00 PM [facility]     /67 (Patient Position: Sitting)   Pulse (!) 57    "Temp 98.3 °F (36.8 °C) (Oral)   Resp 18   Ht 5' 11" (1.803 m)   Wt 90.1 kg (198 lb 10.2 oz)   SpO2 95%   BMI 27.70 kg/m²        Medication List        START taking these medications      acetaminophen 500 MG tablet  Commonly known as: TYLENOL  Take 2 tablets (1,000 mg total) by mouth every 8 (eight) hours. for 7 days     oxyCODONE 5 MG immediate release tablet  Commonly known as: ROXICODONE  Take 1 tablet (5 mg total) by mouth every 4 (four) hours as needed for Pain.     phenazopyridine 100 MG tablet  Commonly known as: PYRIDIUM  Take 1 tablet (100 mg total) by mouth 3 (three) times daily with meals. for 10 days            CONTINUE taking these medications      atorvastatin 20 MG tablet  Commonly known as: LIPITOR  Take 1 tablet (20 mg total) by mouth nightly.     bicalutamide 50 MG Tab  Commonly known as: CASODEX  Take 1 tablet (50 mg total) by mouth once daily.     cholecalciferol (vitamin D3) 125 mcg (5,000 unit) Tab  Take 1 tablet (5,000 Units total) by mouth once daily.     enzalutamide 40 mg Tab  Take 160 mg by mouth once daily.     FARXIGA 10 mg tablet  Generic drug: dapagliflozin propanediol  Take 1 tablet (10 mg total) by mouth once daily.     finasteride 5 mg tablet  Commonly known as: PROSCAR  Take 1 tablet (5 mg total) by mouth once daily.     hepatitis B virus vacc.rec(PF) 20 mcg/mL Syrg  Commonly known as: ENGERIX-B  Inject 1 mL (20 mcg total) into the muscle once. for 1 dose  Start taking on: June 21, 2024     levothyroxine 137 MCG Tab tablet  Commonly known as: SYNTHROID  Take 1 tablet (137 mcg total) by mouth once daily.     losartan 25 MG tablet  Commonly known as: COZAAR  Take 1 tablet (25 mg total) by mouth nightly.     metFORMIN 500 MG tablet  Commonly known as: GLUCOPHAGE  Take 1 tablet (500 mg total) by mouth 2 (two) times daily.     tamsulosin 0.4 mg Cap  Commonly known as: FLOMAX  Take 1 capsule (0.4 mg total) by mouth once daily.               Where to Get Your Medications        These " medications were sent to [facility] [place] [address], [place] [address]      Hours: Mon-Fri, 8a-5:30p Phone: [phone]   acetaminophen 500 MG tablet  oxyCODONE 5 MG immediate release tablet  phenazopyridine 100 MG tablet       No orders of the defined types were placed in this encounter.

## 2024-01-25 NOTE — PROGRESS NOTES
Virtual Nurse:Discharge orders noted; additional clinical references attached.  and pharmacy tech notified.  Patient's discharge instruction packet given by bedside RN.    Cued into patient's room.  Permission received per patient to turn camera to view patient.  Introduced as VN that will be instructing on discharge instructions.  Family member at bedside.  Educated patient on reason for admission; medications to hold, continue, and start, appointment to follow-up with doctor, and when to return to ED. Teach back method used. Verbalized understanding  Number given for 24/7 Nurse Line. Opportunity given for questions and questions answered.  Bedside nurse updated. Transport to [facility] requested.     [employer] #[id number] for entire discharge process.       01/25/24 1351   Shift   Virtual Nurse - Patient Verbalized Approval Of Camera Use;VN Rounding   Type of Frequent Check   Type Patient Rounds   Safety/Activity   Patient Rounds visualized patient   Safety Promotion/Fall Prevention Fall Risk reviewed with patient/family   Activity Assistance Provided independent

## 2024-01-25 NOTE — NURSING
Patient voided successfully.  Nena Usman #[id number].  IV removed intact and tolerated well.  Pharmacy delivered at bedside.  AVS printed and handed to patient.

## 2024-01-25 NOTE — RESPIRATORY THERAPY
Pt getting 2250mL on incentive spirometry. Pt encouraged to continue deep breathing and coughing. No apparent distress noted. Will continue to monitor

## 2024-01-25 NOTE — PLAN OF CARE
"   01/25/24 1227   Final Note   Assessment Type Final Discharge Note   Anticipated Discharge Disposition Home   Hospital Resources/Appts/Education Provided Appointments scheduled and added to AVS   Post-Acute Status   Discharge Delays None known at this time     FU with [facility] requested. Active with [facility].    Future Appointments   Date Time Provider Department Center   2/8/2024  8:00 AM APPOINTMENT LAB, [facility] [place] [facility]   2/8/2024  9:00 AM Yasir Chi MD [facility] HEM ONC [place] [facility]   2/8/2024  9:30 AM CHAIR 05 [facility] CHEMO [place] [facility]   3/4/2024  2:00 PM Pawan Garza MD [facility] PCW     /67 (Patient Position: Sitting)   Pulse (!) 57   Temp 98.3 °F (36.8 °C) (Oral)   Resp 18   Ht 5' 11" (1.803 m)   Wt 90.1 kg (198 lb 10.2 oz)   SpO2 95%   BMI 27.70 kg/m²            Medication List        START taking these medications      acetaminophen 500 MG tablet  Commonly known as: TYLENOL  Take 2 tablets (1,000 mg total) by mouth every 8 (eight) hours. for 7 days     oxyCODONE 5 MG immediate release tablet  Commonly known as: ROXICODONE  Take 1 tablet (5 mg total) by mouth every 4 (four) hours as needed for Pain.     phenazopyridine 100 MG tablet  Commonly known as: PYRIDIUM  Take 1 tablet (100 mg total) by mouth 3 (three) times daily with meals. for 10 days            CONTINUE taking these medications      atorvastatin 20 MG tablet  Commonly known as: LIPITOR  Take 1 tablet (20 mg total) by mouth nightly.     bicalutamide 50 MG Tab  Commonly known as: CASODEX  Take 1 tablet (50 mg total) by mouth once daily.     cholecalciferol (vitamin D3) 125 mcg (5,000 unit) Tab  Take 1 tablet (5,000 Units total) by mouth once daily.     enzalutamide 40 mg Tab  Take 160 mg by mouth once daily.     FARXIGA 10 mg tablet  Generic drug: dapagliflozin propanediol  Take 1 tablet (10 mg total) by mouth once daily.     finasteride 5 mg tablet  Commonly known as: PROSCAR  Take 1 tablet (5 mg total) " by mouth once daily.     hepatitis B virus vacc.rec(PF) 20 mcg/mL Syrg  Commonly known as: ENGERIX-B  Inject 1 mL (20 mcg total) into the muscle once. for 1 dose  Start taking on: June 21, 2024     levothyroxine 137 MCG Tab tablet  Commonly known as: SYNTHROID  Take 1 tablet (137 mcg total) by mouth once daily.     losartan 25 MG tablet  Commonly known as: COZAAR  Take 1 tablet (25 mg total) by mouth nightly.     metFORMIN 500 MG tablet  Commonly known as: GLUCOPHAGE  Take 1 tablet (500 mg total) by mouth 2 (two) times daily.     tamsulosin 0.4 mg Cap  Commonly known as: FLOMAX  Take 1 capsule (0.4 mg total) by mouth once daily.               Where to Get Your Medications        These medications were sent to [facility] [place] [address], [place] [address]      Hours: Mon-Fri, 8a-5:30p Phone: [phone]   acetaminophen 500 MG tablet  oxyCODONE 5 MG immediate release tablet  phenazopyridine 100 MG tablet

## 2024-01-25 NOTE — PLAN OF CARE
Nena , # [id number].  AAO x 4.  Patient oriented to room and surroundings.  Diabetic diet.  CBI/urine output monitoring.  Safety maintained.  Call light within reach, bed in lowest position, bed alarm on.

## 2024-01-25 NOTE — DISCHARGE SUMMARY
[facility]  Urology  Discharge Summary      Patient Name: Waldemar Marquez  MRN: [id number]  Admission Date: 1/24/2024  Hospital Length of Stay: 0 days  Discharge Date and Time:  01/25/2024 9:12 AM  Attending Physician: Reinaldo Ervin MD   Discharging Provider: Chris Barrett MD  Primary Care Physician: Pawan Garza MD    HPI:   Waldemar Marquez is a 64 yo M with a history of prostate cancer and LUTS s/p L ureteral stent placement, TURP on 1/24/24.     Procedure(s) (LRB):  TURP (TRANSURETHRAL RESECTION OF PROSTATE) (N/A)  CYSTOSCOPY, WITH RETROGRADE PYELOGRAM AND URETERAL STENT INSERTION (Left)     Indwelling Lines/Drains at time of discharge:   Lines/Drains/Airways       Drain  Duration                  Urethral Catheter 01/24/24 1500 Non-latex;Straight-tip 22 Fr. <1 day                    Hospital Course (synopsis of major diagnoses, care, treatment, and services provided during the course of the hospital stay):    Patient was admitted to the hospital for procedures as described above, please see operative note for full details. Patient tolerated the procedure well, and was taken to PACU postoperatively for observation during their continued recovery from anesthesia. After an appropriate duration of observation, patient was deemed stable for transfer to the floor to continue their recovery. The postoperative course was largely normal. Patient was able to void and ambulate normally. Patient was able to tolerate prescribed diet. Nausea and pain were controlled with oral medications. Patient was deemed stable for discharge on POD 1 and was discharged with appropriate medications, instructions, and follow up.    Medications and instructions as below.  For more thorough information, please refer to the hospital record and operative report.    Consults:     Significant Diagnostic Studies:     Pending Diagnostic Studies:       Procedure Component Value Units Date/Time    Specimen to Pathology, Surgery Urology [[id number]]  Collected: 01/24/24 1525    Order Status: Sent Lab Status: In process Updated: 01/25/24 0823    Specimen: Tissue             Final Active Diagnoses:    Diagnosis Date Noted POA    PRINCIPAL PROBLEM:  Prostate cancer [C61] 01/16/2024 Yes      Problems Resolved During this Admission:       Discharged Condition: good    Disposition: Home or Self Care    Follow Up:   Follow-up Information       Reinaldo Ervin MD Follow up.    Specialty: Urology  Contact information:  [address]  [address]  [place] [address]  [phone]                           Patient Instructions:      No driving until:   Order Comments: No driving while taking opioid pain medications.     Notify your health care provider if you experience any of the following:  temperature >100.4     Notify your health care provider if you experience any of the following:  persistent nausea and vomiting or diarrhea     Notify your health care provider if you experience any of the following:  severe uncontrolled pain     Notify your health care provider if you experience any of the following:  difficulty breathing or increased cough     Notify your health care provider if you experience any of the following:  severe persistent headache     Notify your health care provider if you experience any of the following:  persistent dizziness, light-headedness, or visual disturbances     Notify your health care provider if you experience any of the following:  increased confusion or weakness     Medications:  Reconciled Home Medications:      Medication List        START taking these medications      acetaminophen 500 MG tablet  Commonly known as: TYLENOL  Take 2 tablets (1,000 mg total) by mouth every 8 (eight) hours. for 7 days     oxyCODONE 5 MG immediate release tablet  Commonly known as: ROXICODONE  Take 1 tablet (5 mg total) by mouth every 4 (four) hours as needed for Pain.     phenazopyridine 100 MG tablet  Commonly known as: PYRIDIUM  Take 1 tablet (100 mg  total) by mouth 3 (three) times daily with meals. for 10 days            CONTINUE taking these medications      atorvastatin 20 MG tablet  Commonly known as: LIPITOR  Take 1 tablet (20 mg total) by mouth nightly.     bicalutamide 50 MG Tab  Commonly known as: CASODEX  Take 1 tablet (50 mg total) by mouth once daily.     cholecalciferol (vitamin D3) 125 mcg (5,000 unit) Tab  Take 1 tablet (5,000 Units total) by mouth once daily.     enzalutamide 40 mg Tab  Take 160 mg by mouth once daily.     FARXIGA 10 mg tablet  Generic drug: dapagliflozin propanediol  Take 1 tablet (10 mg total) by mouth once daily.     finasteride 5 mg tablet  Commonly known as: PROSCAR  Take 1 tablet (5 mg total) by mouth once daily.     hepatitis B virus vacc.rec(PF) 20 mcg/mL Syrg  Commonly known as: ENGERIX-B  Inject 1 mL (20 mcg total) into the muscle once. for 1 dose  Start taking on: June 21, 2024     levothyroxine 137 MCG Tab tablet  Commonly known as: SYNTHROID  Take 1 tablet (137 mcg total) by mouth once daily.     losartan 25 MG tablet  Commonly known as: COZAAR  Take 1 tablet (25 mg total) by mouth nightly.     metFORMIN 500 MG tablet  Commonly known as: GLUCOPHAGE  Take 1 tablet (500 mg total) by mouth 2 (two) times daily.     tamsulosin 0.4 mg Cap  Commonly known as: FLOMAX  Take 1 capsule (0.4 mg total) by mouth once daily.              Time spent on the discharge of patient: 15 minutes    Chris Barrett MD  Urology  [facility] Surg

## 2024-02-01 LAB
COMMENT: NORMAL
FINAL PATHOLOGIC DIAGNOSIS: NORMAL
GROSS: NORMAL
Lab: NORMAL

## 2024-02-06 LAB
DNA RANGE(S) EXAMINED NAR: NORMAL
GENE DIS ANL INTERP-IMP: POSITIVE
GENE DIS ASSESSED: NORMAL
GENE MUT TESTED BLD/T: 6.3 M/MB
MSI CA SPEC-IMP: NORMAL
REASON FOR STUDY: NORMAL
TEMPUS FUSIONADDENDUM: NORMAL
TEMPUS HRD ANALYSIS TYPE: NORMAL
TEMPUS HRD INTERPRETATION: NOT DETECTED
TEMPUS HRD RNA THRESHOLD: 50
TEMPUS PD-L1 (22C3) COMBINED POSITIVE SCORE: 1
TEMPUS PD-L1 (22C3) TUMOR PROPORTION SCORE: <1 %
TEMPUS PORTAL: NORMAL
TEMPUS TRIAL1: NORMAL
TEMPUS TRIAL2: NORMAL
TEMPUS TRIAL3: NORMAL
TEMPUS TRIALCOUNT: 3

## 2024-02-07 DIAGNOSIS — E11.9 TYPE 2 DIABETES MELLITUS WITHOUT COMPLICATION, UNSPECIFIED WHETHER LONG TERM INSULIN USE: ICD-10-CM

## 2024-02-07 NOTE — PROGRESS NOTES
"PATIENT: Waldemar Marquez  MRN: [id number]  DATE: 2/8/2024    Diagnosis:   1. Prostate cancer    2. 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 Secondary malignant neoplasm of bone    4. Anemia in neoplastic disease    5. Hypertension associated with type 2 diabetes mellitus    6. Hematuria, unspecified type      Chief Complaint: Prostate Cancer    Oncologic History:      Oncologic History Prostate cancer      Oncologic Treatment Bicalutamide  Eligard  Plan to switch from bicalutamide to enzalutamide      Pathology 1/24/24:  PROSTATE CHIPS, TRANSURETHRAL RESECTION:   - Prostatic adenocarcinoma, [place] score 4+3=7 (Grade group 3) with tertiary pattern 5 involving 95% of the totally submitted specimen.   - Percentage of pattern 4 = 60%.   - Percentage of pattern 5 = <5%.   - Cribriform glands are present.   - Perineural invasion is present.   - Intraductal carcinoma is present and included in the percentage of pattern 4.     12/27/23:  BIOPSIES OF 1. RIGHT BASE, 2. RIGHT MID, 3. RIGHT APEX, 4. LEFT APEX, 5. LEFT MID, 6. LEFT BASE OF THE PROSTATE:   ADENOCARCINOMA TOTAL URSULA SCORE 7 (3+4)--GRADE GROUP 2       Subjective:    History of Present Illness: Mr. Marquez is a 63 y.o. male who presented in January 2024 for evaluation and management of prostate cancer. He is referred by Dr. Ervin.    - presenting symptoms were nocturia, lower urinary tract symptoms  - MRI prostate (12/11/23) revealed "  Diffusely abnormal appearance of the prostate highly concerning for malignancy (PI-RADS 5).  Suspected involvement of the bilateral seminal vesicles" as well as a suspicious right pelvic lymph node.  - PSA (12/27/23) was elevated at 13 ng/mL, 26 when corrected for finasteride  - biopsy of prostate (12/27/23) revealed [place] 3+4=7 (Grade 2) prostate cancer in all biopsy sites.  - PSMA scan (1/4/24) revealed "biopsy-proven prostate malignancy with bladder invasion.  Tracer-avid lesions concerning for " "supraclavicular, thoracic, abdominopelvic, and osseous metastases."  - per Dr. Ervin's note dated 1/10/24:  "Needs cystoscopy and TURBT and TURP to de-obstruct him, also need ureteroscopy and laser lithotripsy and stent placement."     - he began bicalutamide in January 2024.    Interval history:  - he presents for a follow-up appointment for his prostate cancer.  - he underwent TURBT/TURP on 1/24/24.  - overall, he is doing okay. He is tolerating bicalutamide well. He denies pain, weight loss, shortness of breath, chest pain, nausea, vomiting, diarrhea, constipation.  - he states that enzalutmide is very expensive, so he is applying for financial assistance.        Past medical, surgical, family, and social histories have been reviewed and updated below.    Past Medical History:   Past Medical History:   Diagnosis Date    Dyslipidemia 02/25/2013    Hypertension associated with type 2 diabetes mellitus 12/19/2023    Kidney stone 12/19/2023    Postablative hypothyroidism 02/25/2013    radioactive ablation    Type 2 diabetes mellitus without complication, without long-term current use of insulin 03/14/2011    Vitamin D deficiency 02/25/2013       Past Surgical History:   Past Surgical History:   Procedure Laterality Date    CYSTOSCOPY WITH URETEROSCOPY, RETROGRADE PYELOGRAPHY, AND INSERTION OF STENT Left 1/24/2024    Procedure: CYSTOSCOPY, WITH RETROGRADE PYELOGRAM AND URETERAL STENT INSERTION;  Surgeon: Reinaldo Ervin MD;  Location: [facility] OR;  Service: Urology;  Laterality: Left;    TRANSURETHRAL RESECTION OF PROSTATE N/A 1/24/2024    Procedure: TURP (TRANSURETHRAL RESECTION OF PROSTATE);  Surgeon: Reinaldo Ervin MD;  Location: [facility] OR;  Service: Urology;  Laterality: N/A;  Bipolar TURBT, 1 hr       Family History:   Family History   Problem Relation Age of Onset    Breast cancer Sister        Social History:  reports that he has been smoking cigarettes. He has a 17.5 pack-year smoking history. He has never used " smokeless tobacco. He reports that he does not drink alcohol and does not use drugs.    Allergies:  Review of patient's allergies indicates:  No Known Allergies    Medications:  Current Outpatient Medications   Medication Sig Dispense Refill    atorvastatin (LIPITOR) 20 MG tablet Take 1 tablet (20 mg total) by mouth nightly. 90 tablet 3    bicalutamide (CASODEX) 50 MG Tab Take 1 tablet (50 mg total) by mouth once daily. 30 tablet 11    cholecalciferol, vitamin D3, 125 mcg (5,000 unit) Tab Take 1 tablet (5,000 Units total) by mouth once daily.      dapagliflozin propanediol (FARXIGA) 10 mg tablet Take 1 tablet (10 mg total) by mouth once daily. 90 tablet 3    enzalutamide 40 mg Tab Take 160 mg by mouth once daily. 120 tablet 11    finasteride (PROSCAR) 5 mg tablet Take 1 tablet (5 mg total) by mouth once daily. 90 tablet 3    [START ON 6/21/2024] hepatitis B virus vacc.rec,PF, (ENGERIX-B) 20 mcg/mL Syrg Inject 1 mL (20 mcg total) into the muscle once. for 1 dose 1 mL 0    levothyroxine (SYNTHROID) 137 MCG Tab tablet Take 1 tablet (137 mcg total) by mouth once daily. 90 tablet 3    losartan (COZAAR) 25 MG tablet Take 1 tablet (25 mg total) by mouth nightly. 90 tablet 3    metFORMIN (GLUCOPHAGE) 500 MG tablet Take 1 tablet (500 mg total) by mouth 2 (two) times daily. 180 tablet 3    oxyCODONE (ROXICODONE) 5 MG immediate release tablet Take 1 tablet (5 mg total) by mouth every 4 (four) hours as needed for Pain. 5 tablet 0    tamsulosin (FLOMAX) 0.4 mg Cap Take 1 capsule (0.4 mg total) by mouth once daily. 90 capsule 3     No current facility-administered medications for this visit.       Review of Systems   Constitutional:  Positive for fatigue.   HENT:  Negative for sore throat.    Eyes:  Negative for visual disturbance.   Respiratory:  Negative for shortness of breath.    Cardiovascular:  Negative for chest pain.   Gastrointestinal:  Negative for abdominal pain.   Genitourinary:  Negative for dysuria.    Musculoskeletal:  Negative for back pain.   Skin:  Negative for rash.   Neurological:  Negative for headaches.   Hematological:  Negative for adenopathy.   Psychiatric/Behavioral:  The patient is not nervous/anxious.        ECOG Performance Status:   ECOG SCORE    1 - Restricted in strenuous activity-ambulatory and able to carry out work of a light nature         Objective:      Vitals:   Vitals:    02/08/24 0829   BP: 117/70   Pulse: 79   SpO2: 96%   Weight: 83 kg (182 lb 15.7 oz)     BMI: Body mass index is 25.52 kg/m².    Physical Exam  Vitals and nursing note reviewed.   Constitutional:       Appearance: He is well-developed.   HENT:      Head: Normocephalic and atraumatic.   Eyes:      Pupils: Pupils are equal, round, and reactive to light.   Cardiovascular:      Rate and Rhythm: Normal rate and regular rhythm.   Pulmonary:      Effort: Pulmonary effort is normal.      Breath sounds: Normal breath sounds.   Abdominal:      General: Bowel sounds are normal.      Palpations: Abdomen is soft.   Musculoskeletal:         General: Normal range of motion.      Cervical back: Normal range of motion and neck supple.   Skin:     General: Skin is warm and dry.   Neurological:      Mental Status: He is alert and oriented to person, place, and time.   Psychiatric:         Behavior: Behavior normal.         Thought Content: Thought content normal.         Judgment: Judgment normal.         Laboratory Data:  Labs have been reviewed.    Lab Results   Component Value Date    WBC 8.26 02/08/2024    HGB 13.8 (L) 02/08/2024    HCT 41.1 02/08/2024    MCV 93 02/08/2024     02/08/2024       PSA, Screen (ng/mL)   Date Value   12/16/2014 1.2     PSA Total (ng/mL)   Date Value   12/04/2023 15.4 (H)     PSA, Free (ng/mL)   Date Value   12/04/2023 1.74 (H)     PSA, Free % (%)   Date Value   12/04/2023 11.30         Imaging:    PSMA scan (1/4/24): I have personally reviewed the images    Findings compatible with biopsy-proven  "prostate malignancy with bladder invasion.  Tracer-avid lesions concerning for supraclavicular, thoracic, abdominopelvic, and osseous metastases as detailed above.     Moderate left hydroureteronephrosis to the level of a 1.4 cm proximal ureteral stone.    MRI prostate (12/11/23):  Diffusely abnormal appearance of the prostate highly concerning for malignancy (PI-RADS 5).  Suspected involvement of the bilateral seminal vesicles.     Polypoid mass in the right anterolateral bladder wall concerning for involvement of the bladder wall, though primary bladder malignancy not excluded.     Suspicious right pelvic lymph node.      Assessment:       1. Prostate cancer    2. 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 Secondary malignant neoplasm of bone    4. Anemia in neoplastic disease    5. Hypertension associated with type 2 diabetes mellitus           Plan:     Prostate cancer  - I have reviewed his chart  - presenting symptoms were nocturia, lower urinary tract symptoms  - MRI prostate (12/11/23) revealed "  Diffusely abnormal appearance of the prostate highly concerning for malignancy (PI-RADS 5).  Suspected involvement of the bilateral seminal vesicles" as well as a suspicious right pelvic lymph node.  - PSA (12/27/23) was elevated at 13 ng/mL, 26 when corrected for finasteride  - biopsy of prostate (12/27/23) revealed [place] 3+4=7 (Grade 2) prostate cancer in all biopsy sites.  - PSMA scan (1/4/24) revealed "biopsy-proven prostate malignancy with bladder invasion.  Tracer-avid lesions concerning for supraclavicular, thoracic, abdominopelvic, and osseous metastases."  - he began bicalutamide in January 2024.  - he underwent TURBT/TURP on 1/24/24.  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  - given the volume of disease, I recommended switching from bicalutamide to enzalutamide. He states that enzalutmide is " very expensive, so he is applying for financial assistance. Once he receives enzlutamide, he can switch from bicalutamide to enzalutamide.  - Labs have been reviewed. Hemoglobin is 13.8 g/dL. Follow up PSA and testosterone.  - okay to proceed with leuprolide today.  - recommend dental clearance in order to proceed with denosumab q12 weeks. We will discuss more at next visit.  - return to clinic in 3 months in preparation for next dose of Eligard.    2. Hypertension  - blood pressure is normal today.    3. Hydronephrosis / hematuria  - he underwent TURBT/TURP on 1/24/24.  - he's still having some hematuria. I sent a message to Dr. Ervin.    4. Advance Care Planning     Date: 01/18/2024    Power of   After our discussion (at previous visit), the patient decided to complete a HCPOA and appointed his  wife Taniya Marquez ([phone]) and friend Ernesto Peters ([phone]) .         - return to clinic in 3 months in preparation for next dose of Eligard.    Yasir Chi M.D.  Hematology/Oncology  [facility] - [address], [address]  [place] [address]  Phone: [phone]  Fax: [phone]

## 2024-02-08 ENCOUNTER — OFFICE VISIT (OUTPATIENT)
Dept: HEMATOLOGY/ONCOLOGY | Facility: CLINIC | Age: 64
End: 2024-02-08
Payer: MEDICARE

## 2024-02-08 ENCOUNTER — INFUSION (OUTPATIENT)
Dept: INFUSION THERAPY | Facility: HOSPITAL | Age: 64
End: 2024-02-08
Attending: INTERNAL MEDICINE
Payer: MEDICARE

## 2024-02-08 VITALS
BODY MASS INDEX: 25.52 KG/M2 | DIASTOLIC BLOOD PRESSURE: 70 MMHG | WEIGHT: 183 LBS | HEART RATE: 79 BPM | SYSTOLIC BLOOD PRESSURE: 117 MMHG | OXYGEN SATURATION: 96 %

## 2024-02-08 VITALS
HEART RATE: 71 BPM | DIASTOLIC BLOOD PRESSURE: 76 MMHG | WEIGHT: 183 LBS | SYSTOLIC BLOOD PRESSURE: 138 MMHG | OXYGEN SATURATION: 97 % | RESPIRATION RATE: 20 BRPM | TEMPERATURE: 98 F | HEIGHT: 71 IN | BODY MASS INDEX: 25.62 KG/M2

## 2024-02-08 DIAGNOSIS — R31.9 HEMATURIA, UNSPECIFIED TYPE: ICD-10-CM

## 2024-02-08 DIAGNOSIS — C79.51 SECONDARY MALIGNANT NEOPLASM OF BONE: ICD-10-CM

## 2024-02-08 DIAGNOSIS — I15.2 HYPERTENSION ASSOCIATED WITH TYPE 2 DIABETES MELLITUS: ICD-10-CM

## 2024-02-08 DIAGNOSIS — E11.59 HYPERTENSION ASSOCIATED WITH TYPE 2 DIABETES MELLITUS: ICD-10-CM

## 2024-02-08 DIAGNOSIS — C61 PROSTATE CANCER: Primary | ICD-10-CM

## 2024-02-08 DIAGNOSIS — C77.8 SECONDARY AND MALIGNANT NEOPLASM OF LYMPH NODES OF MULTIPLE SITES: ICD-10-CM

## 2024-02-08 DIAGNOSIS — D63.0 ANEMIA IN NEOPLASTIC DISEASE: ICD-10-CM

## 2024-02-08 PROCEDURE — 1160F RVW MEDS BY RX/DR IN RCRD: CPT | Mod: CPTII,S$GLB,, | Performed by: INTERNAL MEDICINE

## 2024-02-08 PROCEDURE — 3008F BODY MASS INDEX DOCD: CPT | Mod: CPTII,S$GLB,, | Performed by: INTERNAL MEDICINE

## 2024-02-08 PROCEDURE — 63600175 PHARM REV CODE 636 W HCPCS: Mod: JZ,JG | Performed by: INTERNAL MEDICINE

## 2024-02-08 PROCEDURE — 1157F ADVNC CARE PLAN IN RCRD: CPT | Mod: CPTII,S$GLB,, | Performed by: INTERNAL MEDICINE

## 2024-02-08 PROCEDURE — 1159F MED LIST DOCD IN RCRD: CPT | Mod: CPTII,S$GLB,, | Performed by: INTERNAL MEDICINE

## 2024-02-08 PROCEDURE — 3074F SYST BP LT 130 MM HG: CPT | Mod: CPTII,S$GLB,, | Performed by: INTERNAL MEDICINE

## 2024-02-08 PROCEDURE — 99215 OFFICE O/P EST HI 40 MIN: CPT | Mod: S$GLB,,, | Performed by: INTERNAL MEDICINE

## 2024-02-08 PROCEDURE — 3078F DIAST BP <80 MM HG: CPT | Mod: CPTII,S$GLB,, | Performed by: INTERNAL MEDICINE

## 2024-02-08 PROCEDURE — 96401 CHEMO ANTI-NEOPL SQ/IM: CPT

## 2024-02-08 PROCEDURE — 99999 PR PBB SHADOW E&M-EST. PATIENT-LVL III: CPT | Mod: PBBFAC,,, | Performed by: INTERNAL MEDICINE

## 2024-02-08 RX ADMIN — LEUPROLIDE ACETATE 22.5 MG: 22.5 INJECTION, SUSPENSION, EXTENDED RELEASE SUBCUTANEOUS at 09:02

## 2024-02-08 NOTE — Clinical Note
Good morning,  He wanted me to let you know he's still having some hematuria since the procedure on 1/24/24. I think he's seeing you on 2/19/24.  Thanks!

## 2024-02-19 ENCOUNTER — OFFICE VISIT (OUTPATIENT)
Dept: UROLOGY | Facility: CLINIC | Age: 64
End: 2024-02-19
Payer: MEDICARE

## 2024-02-19 VITALS
DIASTOLIC BLOOD PRESSURE: 71 MMHG | HEIGHT: 71 IN | WEIGHT: 181.75 LBS | BODY MASS INDEX: 25.44 KG/M2 | HEART RATE: 64 BPM | SYSTOLIC BLOOD PRESSURE: 122 MMHG

## 2024-02-19 DIAGNOSIS — N20.1 LEFT URETERAL STONE: Primary | ICD-10-CM

## 2024-02-19 DIAGNOSIS — C79.51 PROSTATE CANCER METASTATIC TO BONE: ICD-10-CM

## 2024-02-19 DIAGNOSIS — C61 PROSTATE CANCER METASTATIC TO BONE: ICD-10-CM

## 2024-02-19 PROCEDURE — 3078F DIAST BP <80 MM HG: CPT | Mod: CPTII,S$GLB,, | Performed by: UROLOGY

## 2024-02-19 PROCEDURE — 3288F FALL RISK ASSESSMENT DOCD: CPT | Mod: CPTII,S$GLB,, | Performed by: UROLOGY

## 2024-02-19 PROCEDURE — 3074F SYST BP LT 130 MM HG: CPT | Mod: CPTII,S$GLB,, | Performed by: UROLOGY

## 2024-02-19 PROCEDURE — 99214 OFFICE O/P EST MOD 30 MIN: CPT | Mod: 24,S$GLB,, | Performed by: UROLOGY

## 2024-02-19 PROCEDURE — 1157F ADVNC CARE PLAN IN RCRD: CPT | Mod: CPTII,S$GLB,, | Performed by: UROLOGY

## 2024-02-19 PROCEDURE — 1101F PT FALLS ASSESS-DOCD LE1/YR: CPT | Mod: CPTII,S$GLB,, | Performed by: UROLOGY

## 2024-02-19 PROCEDURE — 99999 PR PBB SHADOW E&M-EST. PATIENT-LVL III: CPT | Mod: PBBFAC,,, | Performed by: UROLOGY

## 2024-02-19 PROCEDURE — 3008F BODY MASS INDEX DOCD: CPT | Mod: CPTII,S$GLB,, | Performed by: UROLOGY

## 2024-02-19 PROCEDURE — 1160F RVW MEDS BY RX/DR IN RCRD: CPT | Mod: CPTII,S$GLB,, | Performed by: UROLOGY

## 2024-02-19 PROCEDURE — 1159F MED LIST DOCD IN RCRD: CPT | Mod: CPTII,S$GLB,, | Performed by: UROLOGY

## 2024-02-19 PROCEDURE — 1126F AMNT PAIN NOTED NONE PRSNT: CPT | Mod: CPTII,S$GLB,, | Performed by: UROLOGY

## 2024-02-19 NOTE — PROGRESS NOTES
[facility] - Urology   Clinic Note    SUBJECTIVE:     Chief Complaint   Patient presents with    Other     Path review        Referral from: No ref. provider found.    History of Present Illness:  Waldemar Marquez is a 63 y.o. male who presents to clinic for lower urinary tract symptoms and BPH.    Patient with bothersome LUTS, including urgency, weak stream, nocturia x 3 times per night. Denies any UTIs. No hematuria. No PSA on file. Has been on tamsulosin with little relief.    Previous medications for BPH include: yes: tamsulosin  AUA Symptom Score: pt unable to fill out, speaks cantonese  Previous prostatic surgery: No    12/04/2023  Here with elevated PSA. PSA is 26 corrected for finasteride.    01/10/2024  Prostate biopsy with Gl 3+4 disease  PSMA pet with osseous mets  Cysto with bladder invasion    PSA:  Lab Results   Component Value Date    PSA 1.2 12/16/2014    PSADIAG 1.1 02/08/2024    PSADIAG 13.0 (H) 11/27/2023    PSATOTAL 15.4 (H) 12/04/2023    PSAFREE 1.74 (H) 12/04/2023 02/19/2024  Here to review the results of his prostate MRI.    MRI Prostate:  Prostate Volume: 50 cc  PSA: 30.8  PSA Density: 0.61  Overall Assessment: PI-RADS 5, 1 targets (entire prostate)  Apparent extension into the bladder    IPSS: 25/5    02/19/2024  S/p TURP and left ureteral stent placement on 1/24/24  Doing well  Still with some hematuria and dysuria  Voiding better    Final Pathologic Diagnosis PROSTATE CHIPS, TRANSURETHRAL RESECTION:  - Prostatic adenocarcinoma, [place] score 4+3=7 (Grade group 3) with tertiary pattern 5 involving 95% of the totally submitted specimen.  - Percentage of pattern 4 = 60%.  - Percentage of pattern 5 = <5%.  - Cribriform glands are present.  - Perineural invasion is present.  - Intraductal carcinoma is present and included in the percentage of pattern 4.       Patient endorses no additional complaints at this time.    Past Medical History:   Diagnosis Date    Dyslipidemia 02/25/2013    Hypertension  associated with type 2 diabetes mellitus 12/19/2023    Kidney stone 12/19/2023    Postablative hypothyroidism 02/25/2013    radioactive ablation    Type 2 diabetes mellitus without complication, without long-term current use of insulin 03/14/2011    Vitamin D deficiency 02/25/2013       Past Surgical History:   Procedure Laterality Date    CYSTOSCOPY WITH URETEROSCOPY, RETROGRADE PYELOGRAPHY, AND INSERTION OF STENT Left 1/24/2024    Procedure: CYSTOSCOPY, WITH RETROGRADE PYELOGRAM AND URETERAL STENT INSERTION;  Surgeon: Reinaldo Ervin MD;  Location: [facility] OR;  Service: Urology;  Laterality: Left;    TRANSURETHRAL RESECTION OF PROSTATE N/A 1/24/2024    Procedure: TURP (TRANSURETHRAL RESECTION OF PROSTATE);  Surgeon: Reinaldo Ervin MD;  Location: [facility] OR;  Service: Urology;  Laterality: N/A;  Bipolar TURBT, 1 hr       Family History   Problem Relation Age of Onset    Breast cancer Sister        Social History     Tobacco Use    Smoking status: Every Day     Current packs/day: 0.25     Average packs/day: 0.3 packs/day for 55.0 years (17.5 ttl pk-yrs)     Types: Cigarettes    Smokeless tobacco: Never   Substance Use Topics    Alcohol use: Never    Drug use: Never       Current Outpatient Medications on File Prior to Visit   Medication Sig Dispense Refill    atorvastatin (LIPITOR) 20 MG tablet Take 1 tablet (20 mg total) by mouth nightly. 90 tablet 3    bicalutamide (CASODEX) 50 MG Tab Take 1 tablet (50 mg total) by mouth once daily. 30 tablet 11    cholecalciferol, vitamin D3, 125 mcg (5,000 unit) Tab Take 1 tablet (5,000 Units total) by mouth once daily.      dapagliflozin propanediol (FARXIGA) 10 mg tablet Take 1 tablet (10 mg total) by mouth once daily. 90 tablet 3    enzalutamide 40 mg Tab Take 160 mg by mouth once daily. 120 tablet 11    finasteride (PROSCAR) 5 mg tablet Take 1 tablet (5 mg total) by mouth once daily. 90 tablet 3    [START ON 6/21/2024] hepatitis B virus vacc.rec,PF, (ENGERIX-B) 20 mcg/mL Syrg  "Inject 1 mL (20 mcg total) into the muscle once. for 1 dose 1 mL 0    levothyroxine (SYNTHROID) 137 MCG Tab tablet Take 1 tablet (137 mcg total) by mouth once daily. 90 tablet 3    losartan (COZAAR) 25 MG tablet Take 1 tablet (25 mg total) by mouth nightly. 90 tablet 3    metFORMIN (GLUCOPHAGE) 500 MG tablet Take 1 tablet (500 mg total) by mouth 2 (two) times daily. 180 tablet 3    oxyCODONE (ROXICODONE) 5 MG immediate release tablet Take 1 tablet (5 mg total) by mouth every 4 (four) hours as needed for Pain. 5 tablet 0    tamsulosin (FLOMAX) 0.4 mg Cap Take 1 capsule (0.4 mg total) by mouth once daily. 90 capsule 3     No current facility-administered medications on file prior to visit.       Review of patient's allergies indicates:  No Known Allergies    Review of Systems:  A review of 10+ systems was conducted with pertinent positive and negative findings documented in HPI with all other systems reviewed and negative.    OBJECTIVE:     Estimated body mass index is 25.35 kg/m² as calculated from the following:    Height as of this encounter: 5' 11" (1.803 m).    Weight as of this encounter: 82.5 kg (181 lb 12.3 oz).    Vital Signs (Most Recent)  Vitals:    02/19/24 0941   BP: 122/71   Pulse: 64       Physical Exam:  GENERAL: patient sitting comfortably  HEENT: normocephalic  NECK: supple, no JVD  PULM: normal chest rise, no increased WOB  HEART: non-diaphoretic  ABDO: soft, nondistended, nontender  BACK: no CVA tenderness bilaterally  SKIN: warm, dry, well perfused  EXT: no bruising or edema  NEURO: grossly normal with no focal deficits  PSYCH: appropriate mood and affect    Genitourinary Exam:  deferred    Lab Results   Component Value Date    BUN 32 (H) 02/08/2024    CREATININE 1.3 02/08/2024    WBC 8.26 02/08/2024    HGB 13.8 (L) 02/08/2024    HCT 41.1 02/08/2024     02/08/2024    AST 14 02/08/2024    ALT 16 02/08/2024    ALKPHOS 52 (L) 02/08/2024    ALBUMIN 3.6 02/08/2024    HGBA1C 6.9 (H) 12/20/2023    "     Imaging:  I have personally reviewed all relevant imaging studies.    No results found for this or any previous visit (from the past 2160 hour(s)).  Results for orders placed or performed during the hospital encounter of 12/11/23 (from the past 2160 hour(s))   MRI Prostate W W/O Contrast    Impression    Diffusely abnormal appearance of the prostate highly concerning for malignancy (PI-RADS 5).  Suspected involvement of the bilateral seminal vesicles.    Polypoid mass in the right anterolateral bladder wall concerning for involvement of the bladder wall, though primary bladder malignancy not excluded.    Suspicious right pelvic lymph node.    Overall Assessment: PI-RADS 5 - Very high (clinically significant cancer is highly likely to be present)    Number of targets created for potential MR/US fusion biopsy = 1 (encompassing nearly the entire prostate).    Electronically signed by resident: Man Marin  Date:    12/12/2023  Time:    08:40    Electronically signed by: Austyn Bardales MD  Date:    12/12/2023  Time:    17:39     SURG [place] Surgery Retrograde Pyelogram  See OP Notes for results.     IMPRESSION: See OP Notes for results.     This procedure was auto-finalized by: Virtual Radiologist       PSA:  Lab Results   Component Value Date    PSA 1.2 12/16/2014    PSADIAG 1.1 02/08/2024    PSADIAG 13.0 (H) 11/27/2023    PSATOTAL 15.4 (H) 12/04/2023    PSAFREE 1.74 (H) 12/04/2023       Testosterone:  Lab Results   Component Value Date    TOTALTESTOST 475 02/08/2024        ASSESSMENT     1. Left ureteral stone    2. Prostate cancer metastatic to bone          PLAN:     - Plan for URS with laser litho and stent removal vs replacement this Weds, risks, benefits, alternatives discussed.  - Path provided  - Voiding sx and hematuria likely secondary to stent  - Cont prostate cancer care per oncology    Reinaldo GUCCI Ervin MD  Urology  [facility]    Disclaimer: This note has been generated using  voice-recognition software. There may be typographical errors that have been missed during proof-reading.

## 2024-02-19 NOTE — H&P (VIEW-ONLY)
[facility] - Urology   Clinic Note    SUBJECTIVE:     Chief Complaint   Patient presents with    Other     Path review        Referral from: No ref. provider found.    History of Present Illness:  Waldemar Marquez is a 63 y.o. male who presents to clinic for lower urinary tract symptoms and BPH.    Patient with bothersome LUTS, including urgency, weak stream, nocturia x 3 times per night. Denies any UTIs. No hematuria. No PSA on file. Has been on tamsulosin with little relief.    Previous medications for BPH include: yes: tamsulosin  AUA Symptom Score: pt unable to fill out, speaks cantonese  Previous prostatic surgery: No    12/04/2023  Here with elevated PSA. PSA is 26 corrected for finasteride.    01/10/2024  Prostate biopsy with Gl 3+4 disease  PSMA pet with osseous mets  Cysto with bladder invasion    PSA:  Lab Results   Component Value Date    PSA 1.2 12/16/2014    PSADIAG 1.1 02/08/2024    PSADIAG 13.0 (H) 11/27/2023    PSATOTAL 15.4 (H) 12/04/2023    PSAFREE 1.74 (H) 12/04/2023 02/19/2024  Here to review the results of his prostate MRI.    MRI Prostate:  Prostate Volume: 50 cc  PSA: 30.8  PSA Density: 0.61  Overall Assessment: PI-RADS 5, 1 targets (entire prostate)  Apparent extension into the bladder    IPSS: 25/5    02/19/2024  S/p TURP and left ureteral stent placement on 1/24/24  Doing well  Still with some hematuria and dysuria  Voiding better    Final Pathologic Diagnosis PROSTATE CHIPS, TRANSURETHRAL RESECTION:  - Prostatic adenocarcinoma, [place] score 4+3=7 (Grade group 3) with tertiary pattern 5 involving 95% of the totally submitted specimen.  - Percentage of pattern 4 = 60%.  - Percentage of pattern 5 = <5%.  - Cribriform glands are present.  - Perineural invasion is present.  - Intraductal carcinoma is present and included in the percentage of pattern 4.       Patient endorses no additional complaints at this time.    Past Medical History:   Diagnosis Date    Dyslipidemia 02/25/2013    Hypertension  associated with type 2 diabetes mellitus 12/19/2023    Kidney stone 12/19/2023    Postablative hypothyroidism 02/25/2013    radioactive ablation    Type 2 diabetes mellitus without complication, without long-term current use of insulin 03/14/2011    Vitamin D deficiency 02/25/2013       Past Surgical History:   Procedure Laterality Date    CYSTOSCOPY WITH URETEROSCOPY, RETROGRADE PYELOGRAPHY, AND INSERTION OF STENT Left 1/24/2024    Procedure: CYSTOSCOPY, WITH RETROGRADE PYELOGRAM AND URETERAL STENT INSERTION;  Surgeon: Reinaldo Ervin MD;  Location: [facility] OR;  Service: Urology;  Laterality: Left;    TRANSURETHRAL RESECTION OF PROSTATE N/A 1/24/2024    Procedure: TURP (TRANSURETHRAL RESECTION OF PROSTATE);  Surgeon: Reinlado Ervin MD;  Location: [facility] OR;  Service: Urology;  Laterality: N/A;  Bipolar TURBT, 1 hr       Family History   Problem Relation Age of Onset    Breast cancer Sister        Social History     Tobacco Use    Smoking status: Every Day     Current packs/day: 0.25     Average packs/day: 0.3 packs/day for 55.0 years (17.5 ttl pk-yrs)     Types: Cigarettes    Smokeless tobacco: Never   Substance Use Topics    Alcohol use: Never    Drug use: Never       Current Outpatient Medications on File Prior to Visit   Medication Sig Dispense Refill    atorvastatin (LIPITOR) 20 MG tablet Take 1 tablet (20 mg total) by mouth nightly. 90 tablet 3    bicalutamide (CASODEX) 50 MG Tab Take 1 tablet (50 mg total) by mouth once daily. 30 tablet 11    cholecalciferol, vitamin D3, 125 mcg (5,000 unit) Tab Take 1 tablet (5,000 Units total) by mouth once daily.      dapagliflozin propanediol (FARXIGA) 10 mg tablet Take 1 tablet (10 mg total) by mouth once daily. 90 tablet 3    enzalutamide 40 mg Tab Take 160 mg by mouth once daily. 120 tablet 11    finasteride (PROSCAR) 5 mg tablet Take 1 tablet (5 mg total) by mouth once daily. 90 tablet 3    [START ON 6/21/2024] hepatitis B virus vacc.rec,PF, (ENGERIX-B) 20 mcg/mL Syrg  "Inject 1 mL (20 mcg total) into the muscle once. for 1 dose 1 mL 0    levothyroxine (SYNTHROID) 137 MCG Tab tablet Take 1 tablet (137 mcg total) by mouth once daily. 90 tablet 3    losartan (COZAAR) 25 MG tablet Take 1 tablet (25 mg total) by mouth nightly. 90 tablet 3    metFORMIN (GLUCOPHAGE) 500 MG tablet Take 1 tablet (500 mg total) by mouth 2 (two) times daily. 180 tablet 3    oxyCODONE (ROXICODONE) 5 MG immediate release tablet Take 1 tablet (5 mg total) by mouth every 4 (four) hours as needed for Pain. 5 tablet 0    tamsulosin (FLOMAX) 0.4 mg Cap Take 1 capsule (0.4 mg total) by mouth once daily. 90 capsule 3     No current facility-administered medications on file prior to visit.       Review of patient's allergies indicates:  No Known Allergies    Review of Systems:  A review of 10+ systems was conducted with pertinent positive and negative findings documented in HPI with all other systems reviewed and negative.    OBJECTIVE:     Estimated body mass index is 25.35 kg/m² as calculated from the following:    Height as of this encounter: 5' 11" (1.803 m).    Weight as of this encounter: 82.5 kg (181 lb 12.3 oz).    Vital Signs (Most Recent)  Vitals:    02/19/24 0941   BP: 122/71   Pulse: 64       Physical Exam:  GENERAL: patient sitting comfortably  HEENT: normocephalic  NECK: supple, no JVD  PULM: normal chest rise, no increased WOB  HEART: non-diaphoretic  ABDO: soft, nondistended, nontender  BACK: no CVA tenderness bilaterally  SKIN: warm, dry, well perfused  EXT: no bruising or edema  NEURO: grossly normal with no focal deficits  PSYCH: appropriate mood and affect    Genitourinary Exam:  deferred    Lab Results   Component Value Date    BUN 32 (H) 02/08/2024    CREATININE 1.3 02/08/2024    WBC 8.26 02/08/2024    HGB 13.8 (L) 02/08/2024    HCT 41.1 02/08/2024     02/08/2024    AST 14 02/08/2024    ALT 16 02/08/2024    ALKPHOS 52 (L) 02/08/2024    ALBUMIN 3.6 02/08/2024    HGBA1C 6.9 (H) 12/20/2023    "     Imaging:  I have personally reviewed all relevant imaging studies.    No results found for this or any previous visit (from the past 2160 hour(s)).  Results for orders placed or performed during the hospital encounter of 12/11/23 (from the past 2160 hour(s))   MRI Prostate W W/O Contrast    Impression    Diffusely abnormal appearance of the prostate highly concerning for malignancy (PI-RADS 5).  Suspected involvement of the bilateral seminal vesicles.    Polypoid mass in the right anterolateral bladder wall concerning for involvement of the bladder wall, though primary bladder malignancy not excluded.    Suspicious right pelvic lymph node.    Overall Assessment: PI-RADS 5 - Very high (clinically significant cancer is highly likely to be present)    Number of targets created for potential MR/US fusion biopsy = 1 (encompassing nearly the entire prostate).    Electronically signed by resident: Man Marin  Date:    12/12/2023  Time:    08:40    Electronically signed by: Austyn Bardales MD  Date:    12/12/2023  Time:    17:39     SURG FL Surgery Retrograde Pyelogram  See OP Notes for results.     IMPRESSION: See OP Notes for results.     This procedure was auto-finalized by: Virtual Radiologist       PSA:  Lab Results   Component Value Date    PSA 1.2 12/16/2014    PSADIAG 1.1 02/08/2024    PSADIAG 13.0 (H) 11/27/2023    PSATOTAL 15.4 (H) 12/04/2023    PSAFREE 1.74 (H) 12/04/2023       Testosterone:  Lab Results   Component Value Date    TOTALTESTOST 475 02/08/2024        ASSESSMENT     1. Left ureteral stone    2. Prostate cancer metastatic to bone          PLAN:     - Plan for URS with laser litho and stent removal vs replacement this Weds, risks, benefits, alternatives discussed.  - Path provided  - Voiding sx and hematuria likely secondary to stent  - Cont prostate cancer care per oncology    Reinaldo GUCCI Ervin MD  Urology  [facility]    Disclaimer: This note has been generated using  voice-recognition software. There may be typographical errors that have been missed during proof-reading.

## 2024-02-20 ENCOUNTER — TELEPHONE (OUTPATIENT)
Dept: UROLOGY | Facility: CLINIC | Age: 64
End: 2024-02-20
Payer: MEDICARE

## 2024-02-20 NOTE — TELEPHONE ENCOUNTER
----- Message from Brii Titus sent at 2/20/2024 12:31 PM CST -----  .Type:  Needs Medical Advice    Who Called: p  Symptoms (please be specific): pt has surgery tomorrow is waiting on a time to come in please call to let him know   Would the patient rather a call back or a response via [facility]? call  Best Call Back Number: [phone]  Additional Information:

## 2024-02-21 ENCOUNTER — HOSPITAL ENCOUNTER (OUTPATIENT)
Facility: HOSPITAL | Age: 64
Discharge: HOME | End: 2024-02-21
Attending: UROLOGY | Admitting: UROLOGY
Payer: MEDICARE

## 2024-02-21 ENCOUNTER — ANESTHESIA (OUTPATIENT)
Dept: SURGERY | Facility: HOSPITAL | Age: 64
End: 2024-02-21
Payer: MEDICARE

## 2024-02-21 ENCOUNTER — TELEPHONE (OUTPATIENT)
Dept: HEMATOLOGY/ONCOLOGY | Facility: CLINIC | Age: 64
End: 2024-02-21
Payer: MEDICARE

## 2024-02-21 ENCOUNTER — ANESTHESIA EVENT (OUTPATIENT)
Dept: SURGERY | Facility: HOSPITAL | Age: 64
End: 2024-02-21
Payer: MEDICARE

## 2024-02-21 VITALS
TEMPERATURE: 98 F | BODY MASS INDEX: 25.34 KG/M2 | RESPIRATION RATE: 17 BRPM | HEART RATE: 72 BPM | HEIGHT: 71 IN | SYSTOLIC BLOOD PRESSURE: 116 MMHG | DIASTOLIC BLOOD PRESSURE: 56 MMHG | OXYGEN SATURATION: 98 % | WEIGHT: 181 LBS

## 2024-02-21 DIAGNOSIS — N20.1 LEFT URETERAL STONE: Primary | ICD-10-CM

## 2024-02-21 LAB — POCT GLUCOSE: 145 MG/DL (ref 70–110)

## 2024-02-21 PROCEDURE — 71000016 HC POSTOP RECOV ADDL HR: Performed by: UROLOGY

## 2024-02-21 PROCEDURE — 37000008 HC ANESTHESIA 1ST 15 MINUTES: Performed by: UROLOGY

## 2024-02-21 PROCEDURE — 25000003 PHARM REV CODE 250: Performed by: STUDENT IN AN ORGANIZED HEALTH CARE EDUCATION/TRAINING PROGRAM

## 2024-02-21 PROCEDURE — 27201423 OPTIME MED/SURG SUP & DEVICES STERILE SUPPLY: Performed by: UROLOGY

## 2024-02-21 PROCEDURE — 52356 CYSTO/URETERO W/LITHOTRIPSY: CPT | Mod: 79,LT,, | Performed by: UROLOGY

## 2024-02-21 PROCEDURE — C1782 MORCELLATOR: HCPCS | Performed by: UROLOGY

## 2024-02-21 PROCEDURE — 25500020 PHARM REV CODE 255: Performed by: UROLOGY

## 2024-02-21 PROCEDURE — C2617 STENT, NON-COR, TEM W/O DEL: HCPCS | Performed by: UROLOGY

## 2024-02-21 PROCEDURE — 25000003 PHARM REV CODE 250: Performed by: NURSE ANESTHETIST, CERTIFIED REGISTERED

## 2024-02-21 PROCEDURE — 71000033 HC RECOVERY, INTIAL HOUR: Performed by: UROLOGY

## 2024-02-21 PROCEDURE — C1758 CATHETER, URETERAL: HCPCS | Performed by: UROLOGY

## 2024-02-21 PROCEDURE — 36000707: Performed by: UROLOGY

## 2024-02-21 PROCEDURE — C1769 GUIDE WIRE: HCPCS | Performed by: UROLOGY

## 2024-02-21 PROCEDURE — D9220A PRA ANESTHESIA: Mod: CRNA,,, | Performed by: NURSE ANESTHETIST, CERTIFIED REGISTERED

## 2024-02-21 PROCEDURE — 82365 CALCULUS SPECTROSCOPY: CPT | Performed by: UROLOGY

## 2024-02-21 PROCEDURE — 63600175 PHARM REV CODE 636 W HCPCS: Performed by: NURSE ANESTHETIST, CERTIFIED REGISTERED

## 2024-02-21 PROCEDURE — C1894 INTRO/SHEATH, NON-LASER: HCPCS | Performed by: UROLOGY

## 2024-02-21 PROCEDURE — 36000706: Performed by: UROLOGY

## 2024-02-21 PROCEDURE — 74420 UROGRAPHY RTRGR +-KUB: CPT | Mod: 26,,, | Performed by: UROLOGY

## 2024-02-21 PROCEDURE — 37000009 HC ANESTHESIA EA ADD 15 MINS: Performed by: UROLOGY

## 2024-02-21 PROCEDURE — 63600175 PHARM REV CODE 636 W HCPCS: Performed by: UROLOGY

## 2024-02-21 PROCEDURE — D9220A PRA ANESTHESIA: Mod: ANES,,, | Performed by: STUDENT IN AN ORGANIZED HEALTH CARE EDUCATION/TRAINING PROGRAM

## 2024-02-21 PROCEDURE — 71000015 HC POSTOP RECOV 1ST HR: Performed by: UROLOGY

## 2024-02-21 DEVICE — URETERAL STENT
Type: IMPLANTABLE DEVICE | Site: URETER | Status: FUNCTIONAL
Brand: POLARIS™ ULTRA

## 2024-02-21 RX ORDER — ONDANSETRON HYDROCHLORIDE 2 MG/ML
INJECTION, SOLUTION INTRAVENOUS
Status: DISCONTINUED | OUTPATIENT
Start: 2024-02-21 | End: 2024-02-21

## 2024-02-21 RX ORDER — ONDANSETRON HYDROCHLORIDE 2 MG/ML
4 INJECTION, SOLUTION INTRAVENOUS DAILY PRN
Status: DISCONTINUED | OUTPATIENT
Start: 2024-02-21 | End: 2024-02-21 | Stop reason: HOSPADM

## 2024-02-21 RX ORDER — OXYCODONE HYDROCHLORIDE 5 MG/1
5 TABLET ORAL EVERY 6 HOURS PRN
Qty: 5 TABLET | Refills: 0 | Status: SHIPPED | OUTPATIENT
Start: 2024-02-21 | End: 2024-03-04

## 2024-02-21 RX ORDER — OXYBUTYNIN CHLORIDE 5 MG/1
5 TABLET ORAL 3 TIMES DAILY PRN
Qty: 21 TABLET | Refills: 0 | Status: SHIPPED | OUTPATIENT
Start: 2024-02-21 | End: 2024-03-04

## 2024-02-21 RX ORDER — KETOROLAC TROMETHAMINE 10 MG/1
10 TABLET, FILM COATED ORAL EVERY 6 HOURS
Qty: 12 TABLET | Refills: 0 | Status: SHIPPED | OUTPATIENT
Start: 2024-02-21 | End: 2024-02-24

## 2024-02-21 RX ORDER — LIDOCAINE HYDROCHLORIDE 20 MG/ML
INJECTION, SOLUTION EPIDURAL; INFILTRATION; INTRACAUDAL; PERINEURAL
Status: DISCONTINUED | OUTPATIENT
Start: 2024-02-21 | End: 2024-02-21

## 2024-02-21 RX ORDER — TAMSULOSIN HYDROCHLORIDE 0.4 MG/1
0.4 CAPSULE ORAL NIGHTLY
Qty: 14 CAPSULE | Refills: 0 | Status: SHIPPED | OUTPATIENT
Start: 2024-02-21 | End: 2024-03-04

## 2024-02-21 RX ORDER — ROCURONIUM BROMIDE 10 MG/ML
INJECTION, SOLUTION INTRAVENOUS
Status: DISCONTINUED | OUTPATIENT
Start: 2024-02-21 | End: 2024-02-21

## 2024-02-21 RX ORDER — ACETAMINOPHEN 10 MG/ML
INJECTION, SOLUTION INTRAVENOUS
Status: DISCONTINUED | OUTPATIENT
Start: 2024-02-21 | End: 2024-02-21

## 2024-02-21 RX ORDER — PROPOFOL 10 MG/ML
VIAL (ML) INTRAVENOUS
Status: DISCONTINUED | OUTPATIENT
Start: 2024-02-21 | End: 2024-02-21

## 2024-02-21 RX ORDER — OXYCODONE HYDROCHLORIDE 5 MG/1
5 TABLET ORAL
Status: DISCONTINUED | OUTPATIENT
Start: 2024-02-21 | End: 2024-02-21 | Stop reason: HOSPADM

## 2024-02-21 RX ORDER — FENTANYL CITRATE 50 UG/ML
INJECTION, SOLUTION INTRAMUSCULAR; INTRAVENOUS
Status: DISCONTINUED | OUTPATIENT
Start: 2024-02-21 | End: 2024-02-21

## 2024-02-21 RX ORDER — HYDROMORPHONE HYDROCHLORIDE 2 MG/ML
0.5 INJECTION, SOLUTION INTRAMUSCULAR; INTRAVENOUS; SUBCUTANEOUS EVERY 5 MIN PRN
Status: DISCONTINUED | OUTPATIENT
Start: 2024-02-21 | End: 2024-02-21 | Stop reason: HOSPADM

## 2024-02-21 RX ORDER — PHENAZOPYRIDINE HYDROCHLORIDE 200 MG/1
200 TABLET, FILM COATED ORAL 3 TIMES DAILY PRN
Qty: 21 TABLET | Refills: 0 | Status: SHIPPED | OUTPATIENT
Start: 2024-02-21 | End: 2024-02-28

## 2024-02-21 RX ORDER — MIDAZOLAM HYDROCHLORIDE 1 MG/ML
INJECTION INTRAMUSCULAR; INTRAVENOUS
Status: DISCONTINUED | OUTPATIENT
Start: 2024-02-21 | End: 2024-02-21

## 2024-02-21 RX ORDER — EPHEDRINE SULFATE 50 MG/ML
INJECTION, SOLUTION INTRAVENOUS
Status: DISCONTINUED | OUTPATIENT
Start: 2024-02-21 | End: 2024-02-21

## 2024-02-21 RX ORDER — CEFAZOLIN SODIUM 2 G/50ML
2 SOLUTION INTRAVENOUS
Status: COMPLETED | OUTPATIENT
Start: 2024-02-21 | End: 2024-02-21

## 2024-02-21 RX ADMIN — ONDANSETRON 8 MG: 2 INJECTION, SOLUTION INTRAMUSCULAR; INTRAVENOUS at 12:02

## 2024-02-21 RX ADMIN — SUGAMMADEX 200 MG: 100 INJECTION, SOLUTION INTRAVENOUS at 01:02

## 2024-02-21 RX ADMIN — PROPOFOL 150 MG: 10 INJECTION, EMULSION INTRAVENOUS at 11:02

## 2024-02-21 RX ADMIN — EPHEDRINE SULFATE 5 MG: 50 INJECTION, SOLUTION INTRAMUSCULAR; INTRAVENOUS; SUBCUTANEOUS at 12:02

## 2024-02-21 RX ADMIN — EPHEDRINE SULFATE 10 MG: 50 INJECTION, SOLUTION INTRAMUSCULAR; INTRAVENOUS; SUBCUTANEOUS at 12:02

## 2024-02-21 RX ADMIN — ROCURONIUM BROMIDE 50 MG: 10 INJECTION, SOLUTION INTRAVENOUS at 11:02

## 2024-02-21 RX ADMIN — EPHEDRINE SULFATE 10 MG: 50 INJECTION, SOLUTION INTRAMUSCULAR; INTRAVENOUS; SUBCUTANEOUS at 01:02

## 2024-02-21 RX ADMIN — FENTANYL CITRATE 50 MCG: 50 INJECTION INTRAMUSCULAR; INTRAVENOUS at 01:02

## 2024-02-21 RX ADMIN — ACETAMINOPHEN 1000 MG: 10 INJECTION, SOLUTION INTRAVENOUS at 12:02

## 2024-02-21 RX ADMIN — FENTANYL CITRATE 50 MCG: 50 INJECTION INTRAMUSCULAR; INTRAVENOUS at 11:02

## 2024-02-21 RX ADMIN — SODIUM CHLORIDE, SODIUM LACTATE, POTASSIUM CHLORIDE, AND CALCIUM CHLORIDE: .6; .31; .03; .02 INJECTION, SOLUTION INTRAVENOUS at 11:02

## 2024-02-21 RX ADMIN — MIDAZOLAM HYDROCHLORIDE 2 MG: 1 INJECTION INTRAMUSCULAR; INTRAVENOUS at 11:02

## 2024-02-21 RX ADMIN — LIDOCAINE HYDROCHLORIDE 50 MG: 20 INJECTION, SOLUTION EPIDURAL; INFILTRATION; INTRACAUDAL; PERINEURAL at 11:02

## 2024-02-21 RX ADMIN — OXYCODONE 5 MG: 5 TABLET ORAL at 01:02

## 2024-02-21 RX ADMIN — CEFAZOLIN SODIUM 2 G: 2 SOLUTION INTRAVENOUS at 11:02

## 2024-02-21 RX ADMIN — SODIUM CHLORIDE, SODIUM LACTATE, POTASSIUM CHLORIDE, AND CALCIUM CHLORIDE: .6; .31; .03; .02 INJECTION, SOLUTION INTRAVENOUS at 12:02

## 2024-02-21 NOTE — PLAN OF CARE
Patient discharge criteria met. IV removed per order with catheter intact.  Patient voided per protocol.  Pain well controlled, VSS.  Patient refused hospital provided  once back to room and opted for his friend Ernesto Pena, who is with him to translate for him.  Patient verbalized understanding through his friend.  No complications noted.

## 2024-02-21 NOTE — OP NOTE
[facility] ([facility]  Urology Department  Operative Note    Date: 02/21/2024    Pre-Op Diagnosis:   Left proximal ureteral stone  Prostate Cancer     Post-Op Diagnosis:   Same    Procedure(s) Performed:   Left ureteroscopy with laser lithotripsy with basket stone extraction  Left retrograde pyelography with on table fluoroscopic interpretation by physician  Left ureteral stent exchange  Cystoscopy  Fluoroscopy < 1 hour    Findings:   Impacted left proximal ureteral stone    Staff Surgeon:  Reinaldo Ervin MD    Assistant: None    Anesthesia: General endotracheal anesthesia    Estimated Blood Loss: Minimal    IV Fluid: Crystalloid    Drains: 6 Fr x 28 cm JJ ureteral stent without strings    Specimen(s): Left ureteral stone    Complications: None    Indications: Waldemar Marquez is a 63 y.o. male with a left ureteral stone, presenting for definitive stone management.     Procedure in detail:  After informed consent was obtained, the patient was brought the the cystoscopy suite and placed in the supine position.  SCDs were applied and working.  Anesthesia was administered.  The patient was then placed in the dorsal lithotomy position and prepped and draped in the usual sterile fashion.  A  film was obtained.    A rigid cystoscope in a 22 Fr sheath was introduced into the patient's urethra.  This passed easily.  The entire urethra was visualized which showed no strictures or masses.  Formal cystoscopy was performed which revealed no masses or lesions suspicious for malignancy. The ureteral orifices were visualized in the normal anatomic position bilaterally.  The stent was seen, grasped, externalized, and removed after the wire was inserted through it.    A hybrid wire was passed up the left ureteral orifice and up into the kidney.  This passed easily and placement was confirmed using fluoro. The cystoscope was removed and a dual lumen catheter was inserted over the wire. A gentle retrograde was performed  confirming placement in the collecting system. A second wire was then passed up into the kidney on this side, again confirmed using fluoroscopy.     A 12/14 fr ureteral access sheath was then passed over the free wire under fluoroscopic guidance.  Subsequently, the flexible ureteroscope was passed into the patient's ureter through the access sheath under direct vision. The sheath was advanced up to the level of the stone which was encountered at the proximal ureter.      A 365 micron holmium laser fiber was passed through the ureteroscope.  The stone was lasered into small particles using the laser.  The laser fiber was removed and a Nitinol tipless basket was introduced through the ureteroscope.  Stone fragments were removed and collected for specimen analysis. Any remaining fragments were insignificant and passable.    A retrograde pyelogram was performed through the ureteroscope using 10cc of Omnipaque contrast ensuring that all calices had been evaluated. No extravasation was noted. The ureteroscope was removed keeping the a wire in place.  The entire course of the ureter was visualized as the ureteroscope and access sheath were simultaneously removed.  There were no significant ureteral fragments left behind.     A 6 Fr x 28 cm JJ ureteral stent without strings was passed over the wire and up into the renal pelvis under fluoroscopic guidance.  When the coil appeared to be in good position in the kidney, the wire was removed.  Good coils were seen in the kidney and the bladder.    The patient tolerated the procedure well and was transferred to the recovery room in stable condition.      Disposition:  The patient will follow up in one week for stent removal.    Reinaldo Ervin MD  Urology  [facility]    Disclaimer: This note has been generated using voice-recognition software. There may be typographical errors that have been missed during proof-reading.

## 2024-02-21 NOTE — DISCHARGE SUMMARY
[facility] - Surgery (Hospital)  Discharge Note  Short Stay    Procedure(s) (LRB):  CYSTOURETEROSCOPY, WITH HOLMIUM LASER LITHOTRIPSY OF URETERAL CALCULUS AND STENT INSERTION (Left)  EXTRACTION - STONE (Left)  CYSTOSCOPY, WITH RETROGRADE PYELOGRAM AND URETERAL STENT INSERTION (Left)      OUTCOME: Patient tolerated treatment/procedure well without complication and is now ready for discharge.    DISPOSITION: Home or Self Care    FINAL DIAGNOSIS:  Kidney stone    FOLLOWUP: In clinic with Dr. Ervin in 1 week with a CT scan prior    DISCHARGE INSTRUCTIONS:    Discharge Procedure Orders   CT Renal Stone Study ABD Pelvis WO   Standing Status: Future Standing Exp. Date: 04/21/24     Order Specific Question Answer Comments   May the Radiologist modify the order per protocol to meet the clinical needs of the patient? Yes      Notify your health care provider if you experience any of the following:  temperature >100.4     Notify your health care provider if you experience any of the following:  persistent nausea and vomiting or diarrhea     Notify your health care provider if you experience any of the following:  severe uncontrolled pain     Notify your health care provider if you experience any of the following:  redness, tenderness, or signs of infection (pain, swelling, redness, odor or green/yellow discharge around incision site)     Notify your health care provider if you experience any of the following:  worsening rash     Notify your health care provider if you experience any of the following:  persistent dizziness, light-headedness, or visual disturbances     CYSTOSCOPY W/ STENT REMOVAL   Standing Status: Future Standing Exp. Date: 02/21/25        TIME SPENT ON DISCHARGE: 10 minutes

## 2024-02-21 NOTE — TELEPHONE ENCOUNTER
----- Message from Yasir Chi MD sent at 2/21/2024 12:23 PM CST -----    ----- Message -----  From: Simona Barahona  Sent: 2/21/2024  12:11 PM CST  To: [facility] Staff    Type: Patient Call Back    Who called: Carla with [employer]     What is the request in detail: Offering Peer to Peer for genetic testing for [facility]    Can the clinic reply by [facility]? No     Would the patient rather a call back or a response via [facility]?     Best call back number: [phone]

## 2024-02-21 NOTE — TRANSFER OF CARE
"Anesthesia Transfer of Care Note    Patient: Waldemar Marquez    Procedure(s) Performed: Procedure(s) (LRB):  CYSTOURETEROSCOPY, WITH HOLMIUM LASER LITHOTRIPSY OF URETERAL CALCULUS AND STENT INSERTION (Left)  EXTRACTION - STONE (Left)  CYSTOSCOPY, WITH RETROGRADE PYELOGRAM AND URETERAL STENT INSERTION (Left)    Patient location: PACU    Anesthesia Type: general    Transport from OR: Transported from OR on 6-10 L/min O2 by face mask with adequate spontaneous ventilation    Post pain: adequate analgesia    Post assessment: no apparent anesthetic complications    Post vital signs: stable    Level of consciousness: responds to stimulation    Nausea/Vomiting: no nausea/vomiting    Complications: none    Transfer of care protocol was followed      Last vitals: Visit Vitals  BP (!) 111/59   Pulse 71   Temp 37.1 °C (98.8 °F) (Skin)   Resp 16   Ht 5' 11" (1.803 m)   Wt 82.1 kg (181 lb)   SpO2 96%   BMI 25.24 kg/m²     "

## 2024-02-21 NOTE — PLAN OF CARE
Patient has met PACU discharge criteria, VSS on room air, pain well controlled. Tolerated cranberry juice and ice chips. Utilized  services during PACU care.  Released from PACU per protocol.

## 2024-02-21 NOTE — DISCHARGE INSTRUCTIONS
Post Ureteroscopy Instructions  Do not strain to have a bowel movement  No strenuous exercise x 7 days  No driving while you are on narcotic pain medications or if you have a Madden catheter    You can expect:  To pass stone fragments if you had a stone procedure  Have pain when you void from your stent if you have a stent in place  See blood in your urine if you have a stent in place    If you have a catheter, please return to the ER if your catheter stops draining or you are having abdominal pain.    Call the doctor if:  Temperature is greater than 101F  Persistent vomiting and inability to keep food down  Inability to void if you do not have a catheter    You will follow up with Dr. Ervin  in 1 week with a CT RSS prior.    Take Flomax (Tamsulosin) 0.4 mg once per day while your stent is in place.  You may take pyridium 200 mg up to 3 times per day for bladder irritation from the stent.  You may take Ditropan 5 mg up to 3 times per day for bladder spasms with stent.    If you have any questions or concerns during normal business hours, please call the urology clinic of your surgery.  If you are having an emergency after 5 pm or weekends, you may call [phone] and ask them to page the urology resident on call.       ANESTHESIA  -For the first 24 hours after surgery:  Do not drive, use heavy equipment, make important decisions, or drink alcohol  -It is normal to feel sleepy for several hours.  Rest until you are more awake.  -Have someone stay with you, if needed.  They can watch for problems and help keep you safe.  -Some possible post anesthesia side effects include: nausea and vomiting, sore throat and hoarseness, sleepiness, and dizziness.    PAIN  -If you have pain after surgery, pain medicine will help you feel better.  Take it as directed, before pain becomes severe.  Most pain relievers taken by mouth need at least 20-30 minutes to start working.  -Do not drive or drink alcohol while taking pain  medicine.  -Pain medication can upset your stomach.  Taking them with a little food may help.  -Other ways to help control pain: elevation, ice, and relaxation  -Call your surgeon if still having unmanageable pain an hour after taking pain medicine.  -Pain medicine can cause constipation.  Taking an over-the counter stool softener while on prescription pain medicine and drinking plenty of fluids can prevent this side effect.  -Call your surgeon if you have severe side effects like: breathing problems, trouble waking up, dizziness, confusion, or severe constipation.    NAUSEA  -Some people have nausea after surgery.  This is often because of anesthesia, pain, pain medicine, or the stress of surgery.  -Do not push yourself to eat.  Start off with clear liquids and soup.  Slowly move to solid foods.  Don't eat fatty, rich, spicy foods at first.  Eat smaller amounts.  -If you develop persistent nausea and vomiting please notify your surgeon immediately.    BLEEDING  -Different types of surgery require different types of care and dressing changes.  It is important to follow all instructions and advice from your surgeon.  Change dressing as directed.  Call your surgeon for any concerns regarding postop bleeding.    SIGNS OF INFECTION  -Signs of infection include: fever, swelling, drainage, and redness  -Notify your surgeon if you have a fever of 100.4 F (38.0 C) or higher.  -Notify your surgeon if you notice redness, swelling, increased pain, pus, or a foul smell at the incision site.

## 2024-02-21 NOTE — ANESTHESIA PROCEDURE NOTES
Intubation    Date/Time: 2/21/2024 11:43 AM    Performed by: Lily Alcaraz CRNA  Authorized by: Mandy Ervin MD    Intubation:     Induction:  Intravenous    Intubated:  Postinduction    Mask Ventilation:  Easy mask    Attempts:  1    Attempted By:  Student    Method of Intubation:  Direct    Blade:  Whitlock 2    Laryngeal View Grade: Grade I - full view of cords      Difficult Airway Encountered?: No      Complications:  None    Airway Device:  Oral endotracheal tube    Airway Device Size:  7.5    Style/Cuff Inflation:  Cuffed (inflated to minimal occlusive pressure)    Tube secured:  22    Secured at:  The lips    Placement Verified By:  Capnometry    Complicating Factors:  None    Findings Post-Intubation:  BS equal bilateral and atraumatic/condition of teeth unchanged  Notes:      Malcolm Peña SRNA

## 2024-02-21 NOTE — ANESTHESIA PREPROCEDURE EVALUATION
02/21/2024  Waldemar Marquez is a 63 y.o., male here for laser lithotripsy of L ureteral stone.       Pre-op Assessment    I have reviewed the Patient Summary Reports.    I have reviewed the NPO Status.   I have reviewed the Medications.     Review of Systems  Anesthesia Hx:  No problems with previous Anesthesia               Denies Personal Hx of Anesthesia complications.                    Social:  Smoker       Hematology/Oncology:  Hematology Normal   Oncology Normal                                   EENT/Dental:  EENT/Dental Normal           Cardiovascular:     Hypertension           hyperlipidemia                             Pulmonary:  Pulmonary Normal                       Renal/:   renal calculi               Hepatic/GI:  Hepatic/GI Normal                 Neurological:  Neurology Normal                                      Endocrine:  Diabetes Hypothyroidism          Dermatological:  Skin Normal    Psych:  Psychiatric Normal                    Physical Exam  General: Well nourished, Cooperative, Alert and Oriented    Airway:  Mallampati: II   Mouth Opening: Normal  TM Distance: Normal  Tongue: Normal  Neck ROM: Normal ROM        Anesthesia Plan  Type of Anesthesia, risks & benefits discussed:    Anesthesia Type: Gen ETT  Intra-op Monitoring Plan: Standard ASA Monitors  Post Op Pain Control Plan: multimodal analgesia  Induction:  IV  Airway Plan: Video and Direct, Post-Induction  Informed Consent: Informed consent signed with the Patient and all parties understand the risks and agree with anesthesia plan.  All questions answered.   ASA Score: 2    Ready For Surgery From Anesthesia Perspective.     .

## 2024-02-21 NOTE — ANESTHESIA POSTPROCEDURE EVALUATION
Anesthesia Post Evaluation    Patient: Waldemar Marquez    Procedure(s) Performed: Procedure(s) (LRB):  CYSTOURETEROSCOPY, WITH HOLMIUM LASER LITHOTRIPSY OF URETERAL CALCULUS AND STENT INSERTION (Left)  EXTRACTION - STONE (Left)  CYSTOSCOPY, WITH RETROGRADE PYELOGRAM AND URETERAL STENT INSERTION (Left)    Final Anesthesia Type: general      Patient location during evaluation: PACU  Patient participation: Yes- Able to Participate  Level of consciousness: awake and alert  Post-procedure vital signs: reviewed and stable  Pain management: adequate  Airway patency: patent    PONV status at discharge: No PONV  Anesthetic complications: no      Cardiovascular status: stable  Respiratory status: room air  Hydration status: euvolemic  Follow-up not needed.              Vitals Value Taken Time   /56 02/21/24 1524   Temp 36.9 °C (98.4 °F) 02/21/24 1524   Pulse 72 02/21/24 1524   Resp 17 02/21/24 1524   SpO2 98 % 02/21/24 1524         Event Time   Out of Recovery 14:06:05         Pain/Ian Score: Pain Rating Prior to Med Admin: 2 (2/21/2024  1:57 PM)  Ian Score: 10 (2/21/2024  3:24 PM)

## 2024-02-27 ENCOUNTER — HOSPITAL ENCOUNTER (OUTPATIENT)
Dept: RADIOLOGY | Facility: HOSPITAL | Age: 64
Discharge: HOME | End: 2024-02-27
Attending: UROLOGY
Payer: MEDICARE

## 2024-02-27 DIAGNOSIS — N20.1 LEFT URETERAL STONE: ICD-10-CM

## 2024-02-27 PROCEDURE — 74176 CT ABD & PELVIS W/O CONTRAST: CPT | Mod: TC

## 2024-02-27 PROCEDURE — 74176 CT ABD & PELVIS W/O CONTRAST: CPT | Mod: 26,,, | Performed by: RADIOLOGY

## 2024-02-28 LAB
COMPN STONE: NORMAL
LABORATORY COMMENT REPORT: NORMAL
SPECIMEN SOURCE: NORMAL
STONE ANALYSIS IR-IMP: NORMAL

## 2024-02-29 ENCOUNTER — PROCEDURE VISIT (OUTPATIENT)
Dept: UROLOGY | Facility: CLINIC | Age: 64
End: 2024-02-29
Payer: MEDICARE

## 2024-02-29 VITALS
DIASTOLIC BLOOD PRESSURE: 76 MMHG | BODY MASS INDEX: 24.92 KG/M2 | SYSTOLIC BLOOD PRESSURE: 135 MMHG | WEIGHT: 178 LBS | HEIGHT: 71 IN | HEART RATE: 57 BPM

## 2024-02-29 DIAGNOSIS — N20.1 LEFT URETERAL STONE: ICD-10-CM

## 2024-02-29 DIAGNOSIS — C61 PROSTATE CANCER METASTATIC TO BONE: Primary | ICD-10-CM

## 2024-02-29 DIAGNOSIS — C79.51 PROSTATE CANCER METASTATIC TO BONE: Primary | ICD-10-CM

## 2024-02-29 DIAGNOSIS — R10.30 LOWER ABDOMINAL PAIN: ICD-10-CM

## 2024-02-29 PROCEDURE — 52310 CYSTOSCOPY AND TREATMENT: CPT | Mod: 79,S$GLB,, | Performed by: UROLOGY

## 2024-02-29 PROCEDURE — 99214 OFFICE O/P EST MOD 30 MIN: CPT | Mod: 25,24,S$GLB, | Performed by: UROLOGY

## 2024-02-29 NOTE — PROGRESS NOTES
[facility]   Clinic Note    SUBJECTIVE:     Chief Complaint   Patient presents with    Other     Possible stent pull\  Consultation        Referral from: Reinaldo Ervin MD.    History of Present Illness:  Waldemar Marquez is a 63 y.o. male who presents to clinic for lower urinary tract symptoms and BPH.    Patient with bothersome LUTS, including urgency, weak stream, nocturia x 3 times per night. Denies any UTIs. No hematuria. No PSA on file. Has been on tamsulosin with little relief.    Previous medications for BPH include: yes: tamsulosin  AUA Symptom Score: pt unable to fill out, speaks cantonese  Previous prostatic surgery: No    12/04/2023  Here with elevated PSA. PSA is 26 corrected for finasteride.    01/10/2024  Prostate biopsy with Gl 3+4 disease  PSMA pet with osseous mets  Cysto with bladder invasion    PSA:  Lab Results   Component Value Date    PSA 1.2 12/16/2014    PSADIAG 1.1 02/08/2024    PSADIAG 13.0 (H) 11/27/2023    PSATOTAL 15.4 (H) 12/04/2023    PSAFREE 1.74 (H) 12/04/2023 02/29/2024  Here to review the results of his prostate MRI.    MRI Prostate:  Prostate Volume: 50 cc  PSA: 30.8  PSA Density: 0.61  Overall Assessment: PI-RADS 5, 1 targets (entire prostate)  Apparent extension into the bladder    IPSS: 25/5    02/29/2024  S/p TURP and left ureteral stent placement on 1/24/24  Doing well  Still with some hematuria and dysuria  Voiding better    Final Pathologic Diagnosis PROSTATE CHIPS, TRANSURETHRAL RESECTION:  - Prostatic adenocarcinoma, Arrington score 4+3=7 (Grade group 3) with tertiary pattern 5 involving 95% of the totally submitted specimen.  - Percentage of pattern 4 = 60%.  - Percentage of pattern 5 = <5%.  - Cribriform glands are present.  - Perineural invasion is present.  - Intraductal carcinoma is present and included in the percentage of pattern 4.     02/29/2024  Here for stent removal  CT with some residual stone burden    Patient endorses no additional complaints at this  time.    Past Medical History:   Diagnosis Date    Dyslipidemia 02/25/2013    Hypertension associated with type 2 diabetes mellitus 12/19/2023    Kidney stone 12/19/2023    Postablative hypothyroidism 02/25/2013    radioactive ablation    Type 2 diabetes mellitus without complication, without long-term current use of insulin 03/14/2011    Vitamin D deficiency 02/25/2013       Past Surgical History:   Procedure Laterality Date    CYSTOSCOPY WITH URETEROSCOPY, RETROGRADE PYELOGRAPHY, AND INSERTION OF STENT Left 1/24/2024    Procedure: CYSTOSCOPY, WITH RETROGRADE PYELOGRAM AND URETERAL STENT INSERTION;  Surgeon: Reinaldo Ervin MD;  Location: [facility] OR;  Service: Urology;  Laterality: Left;    CYSTOSCOPY WITH URETEROSCOPY, RETROGRADE PYELOGRAPHY, AND INSERTION OF STENT Left 2/21/2024    Procedure: CYSTOSCOPY, WITH RETROGRADE PYELOGRAM AND URETERAL STENT INSERTION;  Surgeon: Reinaldo Ervin MD;  Location: [facility] OR;  Service: Urology;  Laterality: Left;    CYSTOURETEROSCOPY, WITH HOLMIUM LASER LITHOTRIPSY OF URETERAL CALCULUS AND STENT INSERTION Left 2/21/2024    Procedure: CYSTOURETEROSCOPY, WITH HOLMIUM LASER LITHOTRIPSY OF URETERAL CALCULUS AND STENT INSERTION;  Surgeon: Reinaldo Ervin MD;  Location: [facility] OR;  Service: Urology;  Laterality: Left;  Please have semirigid ureteroscope, digital flexible ureteroscope, ellis laser, and tech (Maurice) if available.    EXTRACTION - STONE Left 2/21/2024    Procedure: EXTRACTION - STONE;  Surgeon: Reinaldo Ervin MD;  Location: [facility] OR;  Service: Urology;  Laterality: Left;    TRANSURETHRAL RESECTION OF PROSTATE N/A 1/24/2024    Procedure: TURP (TRANSURETHRAL RESECTION OF PROSTATE);  Surgeon: Reinaldo Ervin MD;  Location: [facility] OR;  Service: Urology;  Laterality: N/A;  Bipolar TURBT, 1 hr       Family History   Problem Relation Age of Onset    Breast cancer Sister        Social History     Tobacco Use    Smoking status: Every Day     Current packs/day: 0.25     Average packs/day: 0.3  packs/day for 55.0 years (17.5 ttl pk-yrs)     Types: Cigarettes    Smokeless tobacco: Never   Substance Use Topics    Alcohol use: Never    Drug use: Never       Current Outpatient Medications on File Prior to Visit   Medication Sig Dispense Refill    atorvastatin (LIPITOR) 20 MG tablet Take 1 tablet (20 mg total) by mouth nightly. 90 tablet 3    bicalutamide (CASODEX) 50 MG Tab Take 1 tablet (50 mg total) by mouth once daily. 30 tablet 11    cholecalciferol, vitamin D3, 125 mcg (5,000 unit) Tab Take 1 tablet (5,000 Units total) by mouth once daily.      dapagliflozin propanediol (FARXIGA) 10 mg tablet Take 1 tablet (10 mg total) by mouth once daily. 90 tablet 3    enzalutamide 40 mg Tab Take 160 mg by mouth once daily. 120 tablet 11    finasteride (PROSCAR) 5 mg tablet Take 1 tablet (5 mg total) by mouth once daily. 90 tablet 3    [START ON 2024] hepatitis B virus vacc.rec,PF, (ENGERIX-B) 20 mcg/mL Syrg Inject 1 mL (20 mcg total) into the muscle once. for 1 dose 1 mL 0    levothyroxine (SYNTHROID) 137 MCG Tab tablet Take 1 tablet (137 mcg total) by mouth once daily. 90 tablet 3    losartan (COZAAR) 25 MG tablet Take 1 tablet (25 mg total) by mouth nightly. 90 tablet 3    metFORMIN (GLUCOPHAGE) 500 MG tablet Take 1 tablet (500 mg total) by mouth 2 (two) times daily. 180 tablet 3    oxyCODONE (ROXICODONE) 5 MG immediate release tablet Take 1 tablet (5 mg total) by mouth every 6 (six) hours as needed for Pain. 5 tablet 0    tamsulosin (FLOMAX) 0.4 mg Cap Take 1 capsule (0.4 mg total) by mouth once daily. 90 capsule 3    tamsulosin (FLOMAX) 0.4 mg Cap Take 1 capsule (0.4 mg total) by mouth every evening for 14 days 14 capsule 0    oxybutynin (DITROPAN) 5 MG Tab Take 1 tablet (5 mg total) by mouth 3 (three) times daily as needed. 21 tablet 0    [] phenazopyridine (PYRIDIUM) 200 MG tablet Take 1 tablet (200 mg total) by mouth 3 (three) times daily as needed for Pain. 21 tablet 0     No current  "facility-administered medications on file prior to visit.       Review of patient's allergies indicates:  No Known Allergies    Review of Systems:  A review of 10+ systems was conducted with pertinent positive and negative findings documented in HPI with all other systems reviewed and negative.    OBJECTIVE:     Estimated body mass index is 24.83 kg/m² as calculated from the following:    Height as of this encounter: 5' 11" (1.803 m).    Weight as of this encounter: 80.7 kg (178 lb 0.3 oz).    Vital Signs (Most Recent)  Vitals:    02/29/24 1018   BP: 135/76   Pulse: (!) 57       Physical Exam:  GENERAL: patient sitting comfortably  HEENT: normocephalic  NECK: supple, no JVD  PULM: normal chest rise, no increased WOB  HEART: non-diaphoretic  ABDO: soft, nondistended, nontender  BACK: no CVA tenderness bilaterally  SKIN: warm, dry, well perfused  EXT: no bruising or edema  NEURO: grossly normal with no focal deficits  PSYCH: appropriate mood and affect    Genitourinary Exam:  deferred    Lab Results   Component Value Date    BUN 32 (H) 02/08/2024    CREATININE 1.3 02/08/2024    WBC 8.26 02/08/2024    HGB 13.8 (L) 02/08/2024    HCT 41.1 02/08/2024     02/08/2024    AST 14 02/08/2024    ALT 16 02/08/2024    ALKPHOS 52 (L) 02/08/2024    ALBUMIN 3.6 02/08/2024    HGBA1C 6.9 (H) 12/20/2023        Imaging:  I have personally reviewed all relevant imaging studies.    Results for orders placed or performed during the hospital encounter of 02/27/24 (from the past 2160 hour(s))   CT Renal Stone Study ABD Pelvis WO    Narrative    EXAMINATION:  CT RENAL STONE STUDY ABD PELVIS WO    CLINICAL HISTORY:  Nephrolithiasis, uncomplicated; Calculus of ureter    TECHNIQUE:  Low dose axial images, sagittal and coronal reformations were obtained from the lung bases to the pubic symphysis.  Contrast was not administered.    COMPARISON:  None    FINDINGS:  Heart: No pericardial effusion.    Lung Bases: Well aerated, without " consolidation or pleural fluid.    Liver: Normal in size and attenuation, with no focal hepatic lesions.    Gallbladder: No calcified gallstones.    Bile Ducts: No evidence of dilated ducts.    Pancreas: No mass or peripancreatic fat stranding.    Spleen: Unremarkable.    Adrenals: Unremarkable.    Kidneys/ Ureters: 5 mm nonobstructing stone in the inferior pole.  Mild left-sided hydronephrosis.  Left kidney demonstrates multiple nonobstructing stones, the largest of which measures 13 mm in the inferior pole.  Left-sided double-J stent in satisfactory position.  Irregularity is noted adjacent to the double-J stent in the left proximal ureter near the ureteropelvic junction which may represent small stones.  Left ureter demonstrates mild thickening and adjacent fat stranding which may represent infectious/inflammatory process.    Bladder: No evidence of wall thickening.    Reproductive organs: Unremarkable.    GI Tract/Mesentery: Small hiatal hernia.  No evidence of bowel obstruction or inflammation.  Normal appendix.    Lymph nodes: No lymphadenopathy.    Vasculature: No aneurysm.    Abdominal wall: Unremarkable.    Bones: No acute fracture.      Impression    Left-sided double-J stent in satisfactory position.  Mild left-sided hydronephrosis.  Small amount of high density irregularity adjacent to the double-J stent at the left proximal ureter near the ureteropelvic junction which may represent a few small stones.  Left ureter demonstrates wall thickening with adjacent fat stranding which may represent infectious/inflammatory process.    Bilateral nonobstructing renal stones.    Small hiatal hernia.      Electronically signed by: Austyn Bardales MD  Date:    02/27/2024  Time:    13:56     Results for orders placed or performed during the hospital encounter of 12/11/23 (from the past 2160 hour(s))   MRI Prostate W W/O Contrast    Impression    Diffusely abnormal appearance of the prostate highly concerning for  malignancy (PI-RADS 5).  Suspected involvement of the bilateral seminal vesicles.    Polypoid mass in the right anterolateral bladder wall concerning for involvement of the bladder wall, though primary bladder malignancy not excluded.    Suspicious right pelvic lymph node.    Overall Assessment: PI-RADS 5 - Very high (clinically significant cancer is highly likely to be present)    Number of targets created for potential MR/US fusion biopsy = 1 (encompassing nearly the entire prostate).    Electronically signed by resident: Man Marin  Date:    12/12/2023  Time:    08:40    Electronically signed by: Austyn Bardales MD  Date:    12/12/2023  Time:    17:39     CT Renal Stone Study ABD Pelvis WO  Narrative: EXAMINATION:  CT RENAL STONE STUDY ABD PELVIS WO    CLINICAL HISTORY:  Nephrolithiasis, uncomplicated; Calculus of ureter    TECHNIQUE:  Low dose axial images, sagittal and coronal reformations were obtained from the lung bases to the pubic symphysis.  Contrast was not administered.    COMPARISON:  None    FINDINGS:  Heart: No pericardial effusion.    Lung Bases: Well aerated, without consolidation or pleural fluid.    Liver: Normal in size and attenuation, with no focal hepatic lesions.    Gallbladder: No calcified gallstones.    Bile Ducts: No evidence of dilated ducts.    Pancreas: No mass or peripancreatic fat stranding.    Spleen: Unremarkable.    Adrenals: Unremarkable.    Kidneys/ Ureters: 5 mm nonobstructing stone in the inferior pole.  Mild left-sided hydronephrosis.  Left kidney demonstrates multiple nonobstructing stones, the largest of which measures 13 mm in the inferior pole.  Left-sided double-J stent in satisfactory position.  Irregularity is noted adjacent to the double-J stent in the left proximal ureter near the ureteropelvic junction which may represent small stones.  Left ureter demonstrates mild thickening and adjacent fat stranding which may represent infectious/inflammatory  process.    Bladder: No evidence of wall thickening.    Reproductive organs: Unremarkable.    GI Tract/Mesentery: Small hiatal hernia.  No evidence of bowel obstruction or inflammation.  Normal appendix.    Lymph nodes: No lymphadenopathy.    Vasculature: No aneurysm.    Abdominal wall: Unremarkable.    Bones: No acute fracture.  Impression: Left-sided double-J stent in satisfactory position.  Mild left-sided hydronephrosis.  Small amount of high density irregularity adjacent to the double-J stent at the left proximal ureter near the ureteropelvic junction which may represent a few small stones.  Left ureter demonstrates wall thickening with adjacent fat stranding which may represent infectious/inflammatory process.    Bilateral nonobstructing renal stones.    Small hiatal hernia.    Electronically signed by: Austyn Bardales MD  Date:    02/27/2024  Time:    13:56       PSA:  Lab Results   Component Value Date    PSA 1.2 12/16/2014    PSADIAG 1.1 02/08/2024    PSADIAG 13.0 (H) 11/27/2023    PSATOTAL 15.4 (H) 12/04/2023    PSAFREE 1.74 (H) 12/04/2023       Testosterone:  Lab Results   Component Value Date    TOTALTESTOST 475 02/08/2024        ASSESSMENT     1. Prostate cancer metastatic to bone    2. Left ureteral stone    3. Lower abdominal pain          PLAN:     - patient had residual stone adjacent to the stent and within the kidney on his imaging study.  I discussed options and recommended a second-look ureteroscopy.  Patient stated that he would prefer not to have another surgery at this time and would like his stent removed to give his stones chance to pass.  The stent was removed today and we will repeat imaging in 6 weeks.    I spent a total of 30 minutes on the day of the visit separate from the procedure encounter for surgical counseling. This includes face to face time and non-face to face time preparing to see the patient (eg, review of tests), obtaining and/or reviewing separately obtained history,  documenting clinical information in the electronic or other health record, independently interpreting results and communicating results to the patient/family/caregiver, or care coordinator.    Reinaldo Ervin MD  Urology  [facility]  [facility]    Disclaimer: This note has been generated using voice-recognition software. There may be typographical errors that have been missed during proof-reading.

## 2024-02-29 NOTE — PROCEDURES
Cystoscopy with Stent Removal Procedure Note    Procedure:   Flexible cysto-uretheroscopy and stent removal    Pre Procedure Diagnosis:   s/p ureteroscopy and stent placement    Post Procedure Diagnosis:   Same    Surgeon: Reinaldo Ervin MD     Anesthesia: 2% uro-jet lidocaine jelly for local analgesia     Procedure note:  The risks and benefits were explained, and informed consent was obtained. Prior to procedure a timeout was performed and the correct patient, procedure, and site was verified. 2% lidocaine urojet was used for local analgesia.    Flexible cysto-urethroscopy was performed. The genitalia was prepped and draped in the sterile fashion. The flexible scope was advanced into the urethra and into the bladder. The stent was removed without difficulty.    The patient tolerated the procedure well without complication.     Reinaldo Ervin MD  Urology  [facility]

## 2024-03-04 ENCOUNTER — OFFICE VISIT (OUTPATIENT)
Dept: INTERNAL MEDICINE | Facility: CLINIC | Age: 64
End: 2024-03-04
Payer: MEDICARE

## 2024-03-04 VITALS
BODY MASS INDEX: 25.62 KG/M2 | WEIGHT: 183 LBS | HEIGHT: 71 IN | SYSTOLIC BLOOD PRESSURE: 125 MMHG | HEART RATE: 55 BPM | OXYGEN SATURATION: 98 % | DIASTOLIC BLOOD PRESSURE: 80 MMHG

## 2024-03-04 DIAGNOSIS — N40.1 BENIGN PROSTATIC HYPERPLASIA WITH URINARY FREQUENCY: ICD-10-CM

## 2024-03-04 DIAGNOSIS — Z00.00 ANNUAL PHYSICAL EXAM: Primary | ICD-10-CM

## 2024-03-04 DIAGNOSIS — E78.5 DYSLIPIDEMIA: ICD-10-CM

## 2024-03-04 DIAGNOSIS — C61 PROSTATE CANCER: ICD-10-CM

## 2024-03-04 DIAGNOSIS — E11.9 TYPE 2 DIABETES MELLITUS WITHOUT COMPLICATION, WITHOUT LONG-TERM CURRENT USE OF INSULIN: ICD-10-CM

## 2024-03-04 DIAGNOSIS — E55.9 VITAMIN D DEFICIENCY: ICD-10-CM

## 2024-03-04 DIAGNOSIS — N20.0 KIDNEY STONE: ICD-10-CM

## 2024-03-04 DIAGNOSIS — Z23 NEED FOR HEPATITIS B VACCINATION: ICD-10-CM

## 2024-03-04 DIAGNOSIS — R35.0 BENIGN PROSTATIC HYPERPLASIA WITH URINARY FREQUENCY: ICD-10-CM

## 2024-03-04 DIAGNOSIS — E11.59 HYPERTENSION ASSOCIATED WITH TYPE 2 DIABETES MELLITUS: ICD-10-CM

## 2024-03-04 DIAGNOSIS — I15.2 HYPERTENSION ASSOCIATED WITH TYPE 2 DIABETES MELLITUS: ICD-10-CM

## 2024-03-04 DIAGNOSIS — E89.0 POSTABLATIVE HYPOTHYROIDISM: ICD-10-CM

## 2024-03-04 DIAGNOSIS — Z23 NEED FOR SHINGLES VACCINE: ICD-10-CM

## 2024-03-04 PROCEDURE — 1126F AMNT PAIN NOTED NONE PRSNT: CPT | Mod: CPTII,S$GLB,, | Performed by: INTERNAL MEDICINE

## 2024-03-04 PROCEDURE — 3008F BODY MASS INDEX DOCD: CPT | Mod: CPTII,S$GLB,, | Performed by: INTERNAL MEDICINE

## 2024-03-04 PROCEDURE — 1101F PT FALLS ASSESS-DOCD LE1/YR: CPT | Mod: CPTII,S$GLB,, | Performed by: INTERNAL MEDICINE

## 2024-03-04 PROCEDURE — 3288F FALL RISK ASSESSMENT DOCD: CPT | Mod: CPTII,S$GLB,, | Performed by: INTERNAL MEDICINE

## 2024-03-04 PROCEDURE — 99214 OFFICE O/P EST MOD 30 MIN: CPT | Mod: S$GLB,,, | Performed by: INTERNAL MEDICINE

## 2024-03-04 PROCEDURE — 3079F DIAST BP 80-89 MM HG: CPT | Mod: CPTII,S$GLB,, | Performed by: INTERNAL MEDICINE

## 2024-03-04 PROCEDURE — 3074F SYST BP LT 130 MM HG: CPT | Mod: CPTII,S$GLB,, | Performed by: INTERNAL MEDICINE

## 2024-03-04 PROCEDURE — 1157F ADVNC CARE PLAN IN RCRD: CPT | Mod: CPTII,S$GLB,, | Performed by: INTERNAL MEDICINE

## 2024-03-04 PROCEDURE — 99999 PR PBB SHADOW E&M-EST. PATIENT-LVL III: CPT | Mod: PBBFAC,,, | Performed by: INTERNAL MEDICINE

## 2024-03-04 PROCEDURE — 1159F MED LIST DOCD IN RCRD: CPT | Mod: CPTII,S$GLB,, | Performed by: INTERNAL MEDICINE

## 2024-03-04 RX ORDER — GLIMEPIRIDE 2 MG/1
2 TABLET ORAL
Qty: 90 TABLET | Refills: 3 | Status: SHIPPED | OUTPATIENT
Start: 2024-03-04 | End: 2025-03-04

## 2024-03-04 RX ORDER — ZOSTER VACCINE RECOMBINANT, ADJUVANTED 50 MCG/0.5
0.5 KIT INTRAMUSCULAR ONCE
Qty: 1 EACH | Refills: 0 | Status: SHIPPED | OUTPATIENT
Start: 2024-04-19 | End: 2024-04-19

## 2024-03-04 NOTE — PROGRESS NOTES
INTERNAL MEDICINE CLINIC  Follow-up Visit Progress Note     PRESENTING HISTORY     PCP: Pawan Garza MD    Last Clinic Visit with me: 12-    Current Chief Complaint/Problem:    Chief Complaint   Patient presents with    Annual Exam      History of Present Illness & ROS: Mr. Waldemar Marquez is a 63 y.o. male.    Discussed smoking cessation.    No chest pain or SOB.  No abdominal pain.  No fever.    PAST HISTORY:     Past Medical History:   Diagnosis Date    Dyslipidemia 02/25/2013    Hypertension associated with type 2 diabetes mellitus 12/19/2023    Kidney stone 12/19/2023    Postablative hypothyroidism 02/25/2013    radioactive ablation    Prostate cancer 01/16/2024    Type 2 diabetes mellitus without complication, without long-term current use of insulin 03/14/2011    Vitamin D deficiency 02/25/2013       Past Surgical History:   Procedure Laterality Date    CYSTOSCOPY WITH URETEROSCOPY, RETROGRADE PYELOGRAPHY, AND INSERTION OF STENT Left 1/24/2024    Procedure: CYSTOSCOPY, WITH RETROGRADE PYELOGRAM AND URETERAL STENT INSERTION;  Surgeon: Reinaldo Ervin MD;  Location: [facility];  Service: Urology;  Laterality: Left;    CYSTOSCOPY WITH URETEROSCOPY, RETROGRADE PYELOGRAPHY, AND INSERTION OF STENT Left 2/21/2024    Procedure: CYSTOSCOPY, WITH RETROGRADE PYELOGRAM AND URETERAL STENT INSERTION;  Surgeon: Reinaldo Ervin MD;  Location: [facility] OR;  Service: Urology;  Laterality: Left;    CYSTOURETEROSCOPY, WITH HOLMIUM LASER LITHOTRIPSY OF URETERAL CALCULUS AND STENT INSERTION Left 2/21/2024    Procedure: CYSTOURETEROSCOPY, WITH HOLMIUM LASER LITHOTRIPSY OF URETERAL CALCULUS AND STENT INSERTION;  Surgeon: Reinaldo Ervin MD;  Location: [facility] OR;  Service: Urology;  Laterality: Left;  Please have semirigid ureteroscope, digital flexible ureteroscope, ellis laser, and tech (Maurice) if available.    EXTRACTION - STONE Left 2/21/2024    Procedure: EXTRACTION - STONE;  Surgeon: Reinaldo Ervin MD;  Location: [facility] OR;  Service:  Urology;  Laterality: Left;    TRANSURETHRAL RESECTION OF PROSTATE N/A 1/24/2024    Procedure: TURP (TRANSURETHRAL RESECTION OF PROSTATE);  Surgeon: Reinaldo Ervin MD;  Location: [facility];  Service: Urology;  Laterality: N/A;  Bipolar TURBT, 1 hr       Family History   Problem Relation Age of Onset    Breast cancer Sister        Social History     Socioeconomic History    Marital status:    Tobacco Use    Smoking status: Every Day     Current packs/day: 0.25     Average packs/day: 0.3 packs/day for 55.0 years (17.5 ttl pk-yrs)     Types: Cigarettes    Smokeless tobacco: Never   Substance and Sexual Activity    Alcohol use: Never    Drug use: Never    Sexual activity: Not Currently     Partners: Female   Social History Narrative    Works at restaurant        : John        1 offspring      Social Determinants of Health     Financial Resource Strain: Low Risk  (12/20/2023)    Overall Financial Resource Strain (CARDIA)     Difficulty of Paying Living Expenses: Not hard at all   Food Insecurity: No Food Insecurity (12/20/2023)    Hunger Vital Sign     Worried About Running Out of Food in the Last Year: Never true     Ran Out of Food in the Last Year: Never true   Transportation Needs: No Transportation Needs (12/20/2023)    PRAPARE - Transportation     Lack of Transportation (Medical): No     Lack of Transportation (Non-Medical): No   Physical Activity: Sufficiently Active (12/20/2023)    Exercise Vital Sign     Days of Exercise per Week: 7 days     Minutes of Exercise per Session: 40 min   Stress: Stress Concern Present (12/20/2023)    [place] [employer] - Occupational Stress Questionnaire     Feeling of Stress : To some extent   Social Connections: Unknown (12/20/2023)    Social Connection and Isolation Panel [NHANES]     Frequency of Communication with Friends and Family: More than three times a week     Frequency of Social Gatherings with Friends and Family: More than three  times a week     Active Member of Clubs or Organizations: Yes     Attends Club or Organization Meetings: More than 4 times per year     Marital Status:    Housing Stability: Low Risk  (12/20/2023)    Housing Stability Vital Sign     Unable to Pay for Housing in the Last Year: No     Number of Places Lived in the Last Year: 1     Unstable Housing in the Last Year: No       MEDICATIONS & ALLERGIES:     Current Outpatient Medications on File Prior to Visit   Medication Sig Dispense Refill    atorvastatin (LIPITOR) 20 MG tablet Take 1 tablet (20 mg total) by mouth nightly. 90 tablet 3    bicalutamide (CASODEX) 50 MG Tab Take 1 tablet (50 mg total) by mouth once daily. 30 tablet 11    cholecalciferol, vitamin D3, 125 mcg (5,000 unit) Tab Take 1 tablet (5,000 Units total) by mouth once daily.      dapagliflozin propanediol (FARXIGA) 10 mg tablet Take 1 tablet (10 mg total) by mouth once daily. 90 tablet 3    enzalutamide 40 mg Tab Take 160 mg by mouth once daily. 120 tablet 11    finasteride (PROSCAR) 5 mg tablet Take 1 tablet (5 mg total) by mouth once daily. 90 tablet 3    [START ON 6/21/2024] hepatitis B virus vacc.rec,PF, (ENGERIX-B) 20 mcg/mL Syrg Inject 1 mL (20 mcg total) into the muscle once. for 1 dose 1 mL 0    levothyroxine (SYNTHROID) 137 MCG Tab tablet Take 1 tablet (137 mcg total) by mouth once daily. 90 tablet 3    losartan (COZAAR) 25 MG tablet Take 1 tablet (25 mg total) by mouth nightly. 90 tablet 3    metFORMIN (GLUCOPHAGE) 500 MG tablet Take 1 tablet (500 mg total) by mouth 2 (two) times daily. 180 tablet 3    tamsulosin (FLOMAX) 0.4 mg Cap Take 1 capsule (0.4 mg total) by mouth once daily. 90 capsule 3     Review of patient's allergies indicates:  No Known Allergies    Medications Reconciliation:   I have reconciled the patient's home medications and discharge medications with the patient/family. I have updated all changes.  Refer to After-Visit Medication List.    OBJECTIVE:     Vital  Signs:  Vitals:    03/04/24 1344   BP: 125/80   Pulse: (!) 55     Wt Readings from Last 3 Encounters:   03/04/24 1344 83 kg (182 lb 15.7 oz)   02/29/24 1018 80.7 kg (178 lb 0.3 oz)   02/21/24 1109 82.1 kg (181 lb)     Body mass index is 25.52 kg/m².     Physical Exam:  General: Well developed, well nourished. No distress.  HEENT: Head is normocephalic, atraumatic;  Eyes: Clear conjunctiva.  Neck: Supple, symmetrical neck; trachea midline.  Lungs: Clear to auscultation bilaterally and normal respiratory effort.  Cardiovascular: Heart with regular rate and rhythm.    Extremities: No LE edema.  Abdomen: Abdomen is soft, non-tender non-distended with normal bowel sounds.  Skin: Skin color, texture, turgor normal. No rashes.  Musculoskeletal: Normal gait.   Psychiatric: Normal affect. Alert.    Laboratory  Lab Results   Component Value Date    WBC 8.26 02/08/2024    HGB 13.8 (L) 02/08/2024    HCT 41.1 02/08/2024     02/08/2024    CHOL 158 12/20/2023    TRIG 89 12/20/2023    HDL 60 12/20/2023    ALT 16 02/08/2024    AST 14 02/08/2024     02/08/2024    K 4.2 02/08/2024     02/08/2024    CREATININE 1.3 02/08/2024    BUN 32 (H) 02/08/2024    CO2 21 (L) 02/08/2024    TSH 0.238 (L) 12/20/2023    PSA 1.2 12/16/2014    HGBA1C 6.9 (H) 12/20/2023       ASSESSMENT & PLAN:     Annual physical exam  - He will go to [place] in May for 3 months. Plan for lab in late August/ early September.    Type 2 diabetes mellitus without complication, without long-term current use of insulin     Rx:  -     metFORMIN (GLUCOPHAGE) 500 MG tablet; Take 1 tablet (500 mg total) by mouth 2 (two) times daily.            Replace Farxiga with Amaryl.2 mg daily.  -     glimepiride (AMARYL) 2 MG tablet; Take 1 tablet (2 mg total) by mouth before breakfast.  Dispense: 90 tablet; Refill: 3    -     Diabetic Eye Screening Photo; Future    Dyslipidemia     -     atorvastatin (LIPITOR) 20 MG tablet; Take 1 tablet (20 mg total) by mouth nightly.   "     Hypertension associated with type 2 diabetes mellitus     -     losartan (COZAAR) 25 MG tablet; Take 1 tablet (25 mg total) by mouth nightly.       Postablative hypothyroidism     -     levothyroxine (SYNTHROID) 137 MCG Tab tablet; Take 1 tablet (137 mcg total) by mouth once daily.       Kidney stone  2-  - s/p Cystoureteroscopy with laser lithotripsy of ureteral stoen and stent insertion     2-29-24  - patient had residual stone adjacent to the stent and within the kidney on his imaging study.  I discussed options and recommended a second-look ureteroscopy.  Patient stated that he would prefer not to have another surgery at this time and would like his stent removed to give his stones chance to pass.  The stent was removed today and we will repeat imaging in 6 weeks.     Vitamin D deficiency     -     cholecalciferol, vitamin D3, 125 mcg (5,000 unit) Tab; Take 1 tablet (5,000 Units total) by mouth once daily.     Benign prostatic hyperplasia with urinary frequency  -     tamsulosin (FLOMAX) 0.4 mg Cap; Take 1 capsule (0.4 mg total) by mouth once daily.  -     finasteride (PROSCAR) 5 mg tablet; Take 1 tablet (5 mg total) by mouth once daily.       Prostate Cancer Stage IVB  Oncology 2-8-24  - biopsy of prostate (12/27/23) revealed Ursula 3+4=7 (Grade 2) prostate cancer in all biopsy sites.  - PSMA scan (1/4/24) revealed "biopsy-proven prostate malignancy with bladder invasion.  Tracer-avid lesions concerning for supraclavicular, thoracic, abdominopelvic, and osseous metastases."  - he began bicalutamide in January 2024.  - he underwent TURBT/TURP on 1/24/24.  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  - given the volume of disease, I recommended switching from bicalutamide to enzalutamide. He states that enzalutmide is very expensive, so he is applying for financial assistance. Once he receives enzlutamide, he can switch " from bicalutamide to enzalutamide.  - Labs have been reviewed. Hemoglobin is 13.8 g/dL. Follow up PSA and testosterone.  - okay to proceed with leuprolide today.  - recommend dental clearance in order to proceed with denosumab q12 weeks. We will discuss more at next visit.     Preventive Health Maintenance:     Need for shingles vaccine #2 in April      Return to Clinic for Follow Up with me:  6 months.    Scheduled Follow-up :  Future Appointments   Date Time Provider Department Center   4/15/2024  8:00 AM [facility] CT2 LIMIT 450 LBS [facility] CT SCAN [facility]   4/22/2024 10:45 AM Reinaldo Ervin MD [facility] UROLOGY [facility]   5/8/2024  9:00 AM APPOINTMENT LAB, [place] [facility]OB [facility] LAB [facility]   5/8/2024 10:00 AM Yasir Chi MD [facility] HEM ONC [facility]   5/8/2024 10:30 AM [facility]IR [facility]       After Visit Medication List :     Medication List            Accurate as of March 4, 2024  2:21 PM. If you have any questions, ask your nurse or doctor.                START taking these medications      glimepiride 2 MG tablet  Commonly known as: AMARYL  Take 1 tablet (2 mg total) by mouth before breakfast.  Started by: Pawan Garza MD     SHINGRIX (PF) 50 mcg/0.5 mL injection  Generic drug: varicella-zoster [id number] (PF)  Inject 0.5 mLs into the muscle once. for 1 dose  Start taking on: April 19, 2024  Started by: Pawan Garza MD            CHANGE how you take these medications      hepatitis B virus vacc.rec(PF) 20 mcg/mL Syrg  Commonly known as: ENGERIX-B  Inject 1 mL (20 mcg total) into the muscle once. for 1 dose  Start taking on: May 15, 2024  What changed: These instructions start on May 15, 2024. If you are unsure what to do until then, ask your doctor or other care provider.  Changed by: Pawan Garza MD     tamsulosin 0.4 mg Cap  Commonly known as: FLOMAX  Take 1 capsule (0.4 mg total) by mouth once daily.  What changed: Another medication with the same name was removed. Continue taking this  medication, and follow the directions you see here.  Changed by: Pawan Garza MD            CONTINUE taking these medications      atorvastatin 20 MG tablet  Commonly known as: LIPITOR  Take 1 tablet (20 mg total) by mouth nightly.     bicalutamide 50 MG Tab  Commonly known as: CASODEX  Take 1 tablet (50 mg total) by mouth once daily.     cholecalciferol (vitamin D3) 125 mcg (5,000 unit) Tab  Take 1 tablet (5,000 Units total) by mouth once daily.     enzalutamide 40 mg Tab  Take 160 mg by mouth once daily.     FARXIGA 10 mg tablet  Generic drug: dapagliflozin propanediol  Take 1 tablet (10 mg total) by mouth once daily.     finasteride 5 mg tablet  Commonly known as: PROSCAR  Take 1 tablet (5 mg total) by mouth once daily.     levothyroxine 137 MCG Tab tablet  Commonly known as: SYNTHROID  Take 1 tablet (137 mcg total) by mouth once daily.     losartan 25 MG tablet  Commonly known as: COZAAR  Take 1 tablet (25 mg total) by mouth nightly.     metFORMIN 500 MG tablet  Commonly known as: GLUCOPHAGE  Take 1 tablet (500 mg total) by mouth 2 (two) times daily.            STOP taking these medications      oxybutynin 5 MG Tab  Commonly known as: DITROPAN  Stopped by: Pawan Garza MD     oxyCODONE 5 MG immediate release tablet  Commonly known as: ROXICODONE  Stopped by: Pawan Garza MD               Where to Get Your Medications        These medications were sent to [facility] - [facility] - [address]  [address] [place] [address]      Phone: [phone]   glimepiride 2 MG tablet  hepatitis B virus vacc.rec(PF) 20 mcg/mL Syrg  SHINGRIX (PF) 50 mcg/0.5 mL injection         Signing Physician:  Pawan Garza MD

## 2024-03-05 ENCOUNTER — CLINICAL SUPPORT (OUTPATIENT)
Dept: INTERNAL MEDICINE | Facility: CLINIC | Age: 64
End: 2024-03-05
Attending: INTERNAL MEDICINE
Payer: MEDICARE

## 2024-03-05 DIAGNOSIS — E11.9 TYPE 2 DIABETES MELLITUS WITHOUT COMPLICATION, WITHOUT LONG-TERM CURRENT USE OF INSULIN: ICD-10-CM

## 2024-03-05 PROCEDURE — 92228 IMG RTA DETC/MNTR DS PHY/QHP: CPT | Mod: TC,S$GLB,, | Performed by: INTERNAL MEDICINE

## 2024-03-05 PROCEDURE — 92228 IMG RTA DETC/MNTR DS PHY/QHP: CPT | Mod: 26,S$GLB,, | Performed by: OPTOMETRIST

## 2024-03-05 PROCEDURE — 2025F 7 FLD RTA PHOTO W/O RTNOPTHY: CPT | Mod: CPTII,S$GLB,, | Performed by: OPTOMETRIST

## 2024-03-05 NOTE — PROGRESS NOTES
Waldemar Marquez is a 63 y.o. male here for a diabetic eye screening with non-dilated fundus photos per Dr. Garza.    Patient cooperative?: Yes  Small pupils?: Yes  Last eye exam: unknown    For exam results, see Encounter Report.  Sadia Martinez RN HC

## 2024-03-12 DIAGNOSIS — C79.51 SECONDARY MALIGNANT NEOPLASM OF BONE: ICD-10-CM

## 2024-03-12 DIAGNOSIS — C61 PROSTATE CANCER: Primary | ICD-10-CM

## 2024-03-12 RX ORDER — PREDNISONE 5 MG/1
5 TABLET ORAL DAILY
Qty: 30 TABLET | Refills: 11 | Status: ACTIVE | OUTPATIENT
Start: 2024-03-12

## 2024-03-12 RX ORDER — ABIRATERONE ACETATE 250 MG/1
250 TABLET ORAL DAILY
Qty: 30 TABLET | Refills: 11 | Status: ACTIVE | OUTPATIENT
Start: 2024-03-12 | End: 2025-03-12

## 2024-04-15 ENCOUNTER — HOSPITAL ENCOUNTER (OUTPATIENT)
Dept: RADIOLOGY | Facility: HOSPITAL | Age: 64
Discharge: HOME | End: 2024-04-15
Attending: UROLOGY
Payer: MEDICARE

## 2024-04-15 DIAGNOSIS — R10.30 LOWER ABDOMINAL PAIN: ICD-10-CM

## 2024-04-15 PROCEDURE — 74176 CT ABD & PELVIS W/O CONTRAST: CPT | Mod: TC

## 2024-04-15 PROCEDURE — 74176 CT ABD & PELVIS W/O CONTRAST: CPT | Mod: 26,,, | Performed by: RADIOLOGY

## 2024-04-29 ENCOUNTER — LAB VISIT (OUTPATIENT)
Dept: LAB | Facility: HOSPITAL | Age: 64
End: 2024-04-29
Attending: UROLOGY
Payer: MEDICARE

## 2024-04-29 ENCOUNTER — OFFICE VISIT (OUTPATIENT)
Dept: UROLOGY | Facility: CLINIC | Age: 64
End: 2024-04-29
Payer: MEDICARE

## 2024-04-29 VITALS
WEIGHT: 182.88 LBS | BODY MASS INDEX: 25.6 KG/M2 | HEIGHT: 71 IN | DIASTOLIC BLOOD PRESSURE: 80 MMHG | HEART RATE: 64 BPM | SYSTOLIC BLOOD PRESSURE: 155 MMHG

## 2024-04-29 DIAGNOSIS — C61 PROSTATE CANCER METASTATIC TO BONE: ICD-10-CM

## 2024-04-29 DIAGNOSIS — C79.51 PROSTATE CANCER METASTATIC TO BONE: ICD-10-CM

## 2024-04-29 DIAGNOSIS — N13.8 BPH WITH OBSTRUCTION/LOWER URINARY TRACT SYMPTOMS: ICD-10-CM

## 2024-04-29 DIAGNOSIS — N40.1 BPH WITH OBSTRUCTION/LOWER URINARY TRACT SYMPTOMS: ICD-10-CM

## 2024-04-29 DIAGNOSIS — N20.1 LEFT URETERAL STONE: Primary | ICD-10-CM

## 2024-04-29 DIAGNOSIS — N20.1 LEFT URETERAL STONE: ICD-10-CM

## 2024-04-29 DIAGNOSIS — N20.0 RENAL STONES: ICD-10-CM

## 2024-04-29 LAB
COMPLEXED PSA SERPL-MCNC: 0.04 NG/ML (ref 0–4)
TESTOST SERPL-MCNC: <4 NG/DL (ref 304–1227)

## 2024-04-29 PROCEDURE — 3288F FALL RISK ASSESSMENT DOCD: CPT | Mod: CPTII,S$GLB,, | Performed by: UROLOGY

## 2024-04-29 PROCEDURE — 1159F MED LIST DOCD IN RCRD: CPT | Mod: CPTII,S$GLB,, | Performed by: UROLOGY

## 2024-04-29 PROCEDURE — 3079F DIAST BP 80-89 MM HG: CPT | Mod: CPTII,S$GLB,, | Performed by: UROLOGY

## 2024-04-29 PROCEDURE — 84153 ASSAY OF PSA TOTAL: CPT | Performed by: UROLOGY

## 2024-04-29 PROCEDURE — 1157F ADVNC CARE PLAN IN RCRD: CPT | Mod: CPTII,S$GLB,, | Performed by: UROLOGY

## 2024-04-29 PROCEDURE — 3077F SYST BP >= 140 MM HG: CPT | Mod: CPTII,S$GLB,, | Performed by: UROLOGY

## 2024-04-29 PROCEDURE — 3008F BODY MASS INDEX DOCD: CPT | Mod: CPTII,S$GLB,, | Performed by: UROLOGY

## 2024-04-29 PROCEDURE — 4010F ACE/ARB THERAPY RXD/TAKEN: CPT | Mod: CPTII,S$GLB,, | Performed by: UROLOGY

## 2024-04-29 PROCEDURE — 1101F PT FALLS ASSESS-DOCD LE1/YR: CPT | Mod: CPTII,S$GLB,, | Performed by: UROLOGY

## 2024-04-29 PROCEDURE — 36415 COLL VENOUS BLD VENIPUNCTURE: CPT | Performed by: UROLOGY

## 2024-04-29 PROCEDURE — 84403 ASSAY OF TOTAL TESTOSTERONE: CPT | Performed by: UROLOGY

## 2024-04-29 PROCEDURE — 1160F RVW MEDS BY RX/DR IN RCRD: CPT | Mod: CPTII,S$GLB,, | Performed by: UROLOGY

## 2024-04-29 PROCEDURE — 1126F AMNT PAIN NOTED NONE PRSNT: CPT | Mod: CPTII,S$GLB,, | Performed by: UROLOGY

## 2024-04-29 PROCEDURE — 99214 OFFICE O/P EST MOD 30 MIN: CPT | Mod: S$GLB,,, | Performed by: UROLOGY

## 2024-04-29 PROCEDURE — 99999 PR PBB SHADOW E&M-EST. PATIENT-LVL IV: CPT | Mod: PBBFAC,,, | Performed by: UROLOGY

## 2024-04-29 NOTE — PROGRESS NOTES
[place] - Urology   Clinic Note    SUBJECTIVE:     Chief Complaint   Patient presents with    Follow-up    Nephrolithiasis       Referral from: No ref. provider found.    History of Present Illness:  Waldemar Marquez is a 63 y.o. male who presents to clinic for lower urinary tract symptoms and BPH.    Patient with bothersome LUTS, including urgency, weak stream, nocturia x 3 times per night. Denies any UTIs. No hematuria. No PSA on file. Has been on tamsulosin with little relief.    Previous medications for BPH include: yes: tamsulosin  AUA Symptom Score: pt unable to fill out, speaks cantonese  Previous prostatic surgery: No    12/04/2023  Here with elevated PSA. PSA is 26 corrected for finasteride.    01/10/2024  Prostate biopsy with Gl 3+4 disease  PSMA pet with osseous mets  Cysto with bladder invasion    PSA:  Lab Results   Component Value Date    PSA 1.2 12/16/2014    PSADIAG 1.1 02/08/2024    PSADIAG 13.0 (H) 11/27/2023    PSATOTAL 15.4 (H) 12/04/2023    PSAFREE 1.74 (H) 12/04/2023 04/29/2024  Here to review the results of his prostate MRI.    MRI Prostate:  Prostate Volume: 50 cc  PSA: 30.8  PSA Density: 0.61  Overall Assessment: PI-RADS 5, 1 targets (entire prostate)  Apparent extension into the bladder    IPSS: 25/5    04/29/2024  S/p TURP and left ureteral stent placement on 1/24/24  Doing well  Still with some hematuria and dysuria  Voiding better    Final Pathologic Diagnosis PROSTATE CHIPS, TRANSURETHRAL RESECTION:  - Prostatic adenocarcinoma, Ursula score 4+3=7 (Grade group 3) with tertiary pattern 5 involving 95% of the totally submitted specimen.  - Percentage of pattern 4 = 60%.  - Percentage of pattern 5 = <5%.  - Cribriform glands are present.  - Perineural invasion is present.  - Intraductal carcinoma is present and included in the percentage of pattern 4.     02/29/2024  Here for stent removal  CT with some residual stone burden    04/29/2024  Doing well  Voiding with a strong stream  Still on  ADT, follows with med onc  Last PSA 1.1  Punctate ureteral stone still present with mild hydro    Patient endorses no additional complaints at this time.    Past Medical History:   Diagnosis Date    Dyslipidemia 02/25/2013    Hypertension associated with type 2 diabetes mellitus 12/19/2023    Kidney stone 12/19/2023    Postablative hypothyroidism 02/25/2013    radioactive ablation    Prostate cancer 01/16/2024    Type 2 diabetes mellitus without complication, without long-term current use of insulin 03/14/2011    Vitamin D deficiency 02/25/2013       Past Surgical History:   Procedure Laterality Date    CYSTOSCOPY WITH URETEROSCOPY, RETROGRADE PYELOGRAPHY, AND INSERTION OF STENT Left 1/24/2024    Procedure: CYSTOSCOPY, WITH RETROGRADE PYELOGRAM AND URETERAL STENT INSERTION;  Surgeon: Reinaldo Ervin MD;  Location: [facility] OR;  Service: Urology;  Laterality: Left;    CYSTOSCOPY WITH URETEROSCOPY, RETROGRADE PYELOGRAPHY, AND INSERTION OF STENT Left 2/21/2024    Procedure: CYSTOSCOPY, WITH RETROGRADE PYELOGRAM AND URETERAL STENT INSERTION;  Surgeon: Reinaldo Ervin MD;  Location: [facility] OR;  Service: Urology;  Laterality: Left;    CYSTOURETEROSCOPY, WITH HOLMIUM LASER LITHOTRIPSY OF URETERAL CALCULUS AND STENT INSERTION Left 2/21/2024    Procedure: CYSTOURETEROSCOPY, WITH HOLMIUM LASER LITHOTRIPSY OF URETERAL CALCULUS AND STENT INSERTION;  Surgeon: Reinaldo Ervin MD;  Location: [facility] OR;  Service: Urology;  Laterality: Left;  Please have semirigid ureteroscope, digital flexible ureteroscope, ellis laser, and tech (Maurice) if available.    EXTRACTION - STONE Left 2/21/2024    Procedure: EXTRACTION - STONE;  Surgeon: Reinaldo Ervin MD;  Location: [facility] OR;  Service: Urology;  Laterality: Left;    TRANSURETHRAL RESECTION OF PROSTATE N/A 1/24/2024    Procedure: TURP (TRANSURETHRAL RESECTION OF PROSTATE);  Surgeon: Reinaldo Ervin MD;  Location: [facility] OR;  Service: Urology;  Laterality: N/A;  Bipolar TURBT, 1 hr       Family History    Problem Relation Name Age of Onset    Breast cancer Sister Ms. Marquez        Social History     Tobacco Use    Smoking status: Every Day     Current packs/day: 0.25     Average packs/day: 0.3 packs/day for 55.0 years (17.5 ttl pk-yrs)     Types: Cigarettes    Smokeless tobacco: Never   Substance Use Topics    Alcohol use: Never    Drug use: Never       Current Outpatient Medications on File Prior to Visit   Medication Sig Dispense Refill    abiraterone (ZYTIGA) 250 mg Tab Take 1 tablet (250 mg total) by mouth once daily. 30 tablet 11    atorvastatin (LIPITOR) 20 MG tablet Take 1 tablet (20 mg total) by mouth nightly. 90 tablet 3    bicalutamide (CASODEX) 50 MG Tab Take 1 tablet (50 mg total) by mouth once daily. 30 tablet 11    cholecalciferol, vitamin D3, 125 mcg (5,000 unit) Tab Take 1 tablet (5,000 Units total) by mouth once daily.      finasteride (PROSCAR) 5 mg tablet Take 1 tablet (5 mg total) by mouth once daily. 90 tablet 3    glimepiride (AMARYL) 2 MG tablet Take 1 tablet (2 mg total) by mouth before breakfast. 90 tablet 3    [START ON 5/15/2024] hepatitis B virus vacc.rec,PF, (ENGERIX-B) 20 mcg/mL Syrg Inject 1 mL (20 mcg total) into the muscle once. for 1 dose 1 mL 0    levothyroxine (SYNTHROID) 137 MCG Tab tablet Take 1 tablet (137 mcg total) by mouth once daily. 90 tablet 3    losartan (COZAAR) 25 MG tablet Take 1 tablet (25 mg total) by mouth nightly. 90 tablet 3    metFORMIN (GLUCOPHAGE) 500 MG tablet Take 1 tablet (500 mg total) by mouth 2 (two) times daily. 180 tablet 3    predniSONE (DELTASONE) 5 MG tablet Take 1 tablet (5 mg total) by mouth once daily. 30 tablet 11    tamsulosin (FLOMAX) 0.4 mg Cap Take 1 capsule (0.4 mg total) by mouth once daily. 90 capsule 3     No current facility-administered medications on file prior to visit.       Review of patient's allergies indicates:  No Known Allergies    Review of Systems:  A review of 10+ systems was conducted with pertinent positive and negative  "findings documented in HPI with all other systems reviewed and negative.    OBJECTIVE:     Estimated body mass index is 25.51 kg/m² as calculated from the following:    Height as of this encounter: 5' 11" (1.803 m).    Weight as of this encounter: 83 kg (182 lb 14 oz).    Vital Signs (Most Recent)  Vitals:    04/29/24 1125   BP: (!) 155/80   Pulse: 64       Physical Exam:  GENERAL: patient sitting comfortably  HEENT: normocephalic  NECK: supple, no JVD  PULM: normal chest rise, no increased WOB  HEART: non-diaphoretic  ABDO: soft, nondistended, nontender  BACK: no CVA tenderness bilaterally  SKIN: warm, dry, well perfused  EXT: no bruising or edema  NEURO: grossly normal with no focal deficits  PSYCH: appropriate mood and affect    Genitourinary Exam:  deferred    Lab Results   Component Value Date    BUN 32 (H) 02/08/2024    CREATININE 1.3 02/08/2024    WBC 8.26 02/08/2024    HGB 13.8 (L) 02/08/2024    HCT 41.1 02/08/2024     02/08/2024    AST 14 02/08/2024    ALT 16 02/08/2024    ALKPHOS 52 (L) 02/08/2024    ALBUMIN 3.6 02/08/2024    HGBA1C 6.9 (H) 12/20/2023        Imaging:  I have personally reviewed all relevant imaging studies.    Results for orders placed or performed during the hospital encounter of 04/15/24 (from the past 2160 hour(s))   CT Renal Stone Study ABD Pelvis WO    Narrative    EXAMINATION:  CT RENAL STONE STUDY ABD PELVIS WO    CLINICAL HISTORY:  Flank pain, kidney stone suspected;  Lower abdominal pain, unspecified    FINDINGS:  Comparison is 02/27/2024.    Left double-J ureteric stent has been removed.  Lung bases are clear.  There is a small right lobe liver cyst.  Gallbladder and biliary tree show nothing unusual.  There is a small hiatal hernia.  The spleen, and pancreas demonstrate nothing unusual.  The duodenum is unremarkable.  The adrenal glands are not enlarged.  There is a right lower pole renal calculus.  There is an asymmetrically small left kidney.  There are multiple left " intrarenal calculi.  There is mild left-sided hydronephrosis.  There is a 1 mm proximal left UPJ calculus.  No right-sided hydronephrosis is seen.  No bladder calculi seen.  No para-aortic, retroperitoneal, or mesenteric adenopathy is seen.  The appendix is normal.  There is right-sided diverticulosis.  No obstruction, ileus, or perforation seen.  No ascites is seen.  No abscess, or inflammation is seen.  The bones reveal DJD.      Impression    Small partially obstructing calculus proximal left ureter at the UPJ.    Right lobe liver cyst.    Small hiatal hernia.    Bilateral intrarenal calculi.    Asymmetrically small left kidney.    Right-sided diverticulosis.      Electronically signed by: Chico Garcia MD  Date:    04/15/2024  Time:    08:02     No results found for this or any previous visit (from the past 2160 hour(s)).    CT Renal Stone Study ABD Pelvis WO  Narrative: EXAMINATION:  CT RENAL STONE STUDY ABD PELVIS WO    CLINICAL HISTORY:  Flank pain, kidney stone suspected;  Lower abdominal pain, unspecified    FINDINGS:  Comparison is 02/27/2024.    Left double-J ureteric stent has been removed.  Lung bases are clear.  There is a small right lobe liver cyst.  Gallbladder and biliary tree show nothing unusual.  There is a small hiatal hernia.  The spleen, and pancreas demonstrate nothing unusual.  The duodenum is unremarkable.  The adrenal glands are not enlarged.  There is a right lower pole renal calculus.  There is an asymmetrically small left kidney.  There are multiple left intrarenal calculi.  There is mild left-sided hydronephrosis.  There is a 1 mm proximal left UPJ calculus.  No right-sided hydronephrosis is seen.  No bladder calculi seen.  No para-aortic, retroperitoneal, or mesenteric adenopathy is seen.  The appendix is normal.  There is right-sided diverticulosis.  No obstruction, ileus, or perforation seen.  No ascites is seen.  No abscess, or inflammation is seen.  The bones reveal  DJD.  Impression: Small partially obstructing calculus proximal left ureter at the UPJ.    Right lobe liver cyst.    Small hiatal hernia.    Bilateral intrarenal calculi.    Asymmetrically small left kidney.    Right-sided diverticulosis.    Electronically signed by: Chico Garcia MD  Date:    04/15/2024  Time:    08:02       PSA:  Lab Results   Component Value Date    PSA 1.2 12/16/2014    PSADIAG 1.1 02/08/2024    PSADIAG 13.0 (H) 11/27/2023    PSATOTAL 15.4 (H) 12/04/2023    PSAFREE 1.74 (H) 12/04/2023       Testosterone:  Lab Results   Component Value Date    TOTALTESTOST 475 02/08/2024        ASSESSMENT     1. Left ureteral stone    2. Prostate cancer metastatic to bone    3. BPH with obstruction/lower urinary tract symptoms    4. Renal stones          PLAN:     Nephrolihtiasis and punctate left ureteral stone  We had a long discussion regarding options.  The patient does not desire any management of his ureteral stone at this time as it is punctate and he is having no pain.  Will plan for KUB and renal ultrasound once he returns from [place].    2.   Prostate Cancer, BPH  Voiding well and without issue.  Does have occasional nocturia but not interested in any medical therapy.  We will obtain PSA and testosterone today and can continue with ADT with Medical Oncology.    Follow up in August    Reinaldo Ervin MD  Urology  [facility]    Disclaimer: This note has been generated using voice-recognition software. There may be typographical errors that have been missed during proof-reading.

## 2024-05-05 NOTE — PROGRESS NOTES
"PATIENT: Waldemar Marquez  MRN: [id number]  DATE: 5/8/2024    Diagnosis:   1. Prostate cancer    2. Secondary malignant neoplasm of bone    3. 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 Anemia in neoplastic disease    5. Hypertension associated with type 2 diabetes mellitus      Chief Complaint: Prostate Cancer    Oncologic History:      Oncologic History Prostate cancer      Oncologic Treatment Bicalutamide  Eligard  Plan to switch from bicalutamide to enzalutamide      Pathology 1/24/24:  PROSTATE CHIPS, TRANSURETHRAL RESECTION:   - Prostatic adenocarcinoma, Ursula score 4+3=7 (Grade group 3) with tertiary pattern 5 involving 95% of the totally submitted specimen.   - Percentage of pattern 4 = 60%.   - Percentage of pattern 5 = <5%.   - Cribriform glands are present.   - Perineural invasion is present.   - Intraductal carcinoma is present and included in the percentage of pattern 4.     12/27/23:  BIOPSIES OF 1. RIGHT BASE, 2. RIGHT MID, 3. RIGHT APEX, 4. LEFT APEX, 5. LEFT MID, 6. LEFT BASE OF THE PROSTATE:   ADENOCARCINOMA TOTAL URSULA SCORE 7 (3+4)--GRADE GROUP 2       Subjective:    History of Present Illness: Mr. Marquez is a 63 y.o. male who presented in January 2024 for evaluation and management of prostate cancer. He is referred by Dr. Ervin.    - presenting symptoms were nocturia, lower urinary tract symptoms  - MRI prostate (12/11/23) revealed "  Diffusely abnormal appearance of the prostate highly concerning for malignancy (PI-RADS 5).  Suspected involvement of the bilateral seminal vesicles" as well as a suspicious right pelvic lymph node.  - PSA (12/27/23) was elevated at 13 ng/mL, 26 when corrected for finasteride  - biopsy of prostate (12/27/23) revealed [place] 3+4=7 (Grade 2) prostate cancer in all biopsy sites.  - PSMA scan (1/4/24) revealed "biopsy-proven prostate malignancy with bladder invasion.  Tracer-avid lesions concerning for supraclavicular, thoracic, " "abdominopelvic, and osseous metastases."  - per Dr. Ervin's note dated 1/10/24:  "Needs cystoscopy and TURBT and TURP to de-obstruct him, also need ureteroscopy and laser lithotripsy and stent placement."     - he began bicalutamide in January 2024.    Interval history:  - he presents for a follow-up appointment for his prostate cancer.  He states the abiraterone costs about 200 dollars/month and is asking about taking bicalutamide while he is in [place] over the next 3 months.  - today, he is doing well. He is scheduled for [facility] today. Hematuria and nocturia have resolved.  - He denies pain, weight loss, shortness of breath, chest pain, nausea, vomiting, diarrhea, constipation.         Past medical, surgical, family, and social histories have been reviewed and updated below.    Past Medical History:   Past Medical History:   Diagnosis Date    Dyslipidemia 02/25/2013    Hypertension associated with type 2 diabetes mellitus 12/19/2023    Kidney stone 12/19/2023    Postablative hypothyroidism 02/25/2013    radioactive ablation    Prostate cancer 01/16/2024    Type 2 diabetes mellitus without complication, without long-term current use of insulin 03/14/2011    Vitamin D deficiency 02/25/2013       Past Surgical History:   Past Surgical History:   Procedure Laterality Date    CYSTOSCOPY WITH URETEROSCOPY, RETROGRADE PYELOGRAPHY, AND INSERTION OF STENT Left 1/24/2024    Procedure: CYSTOSCOPY, WITH RETROGRADE PYELOGRAM AND URETERAL STENT INSERTION;  Surgeon: Reinaldo Ervin MD;  Location: [facility] OR;  Service: Urology;  Laterality: Left;    CYSTOSCOPY WITH URETEROSCOPY, RETROGRADE PYELOGRAPHY, AND INSERTION OF STENT Left 2/21/2024    Procedure: CYSTOSCOPY, WITH RETROGRADE PYELOGRAM AND URETERAL STENT INSERTION;  Surgeon: Reinaldo Ervin MD;  Location: [facility] OR;  Service: Urology;  Laterality: Left;    CYSTOURETEROSCOPY, WITH HOLMIUM LASER LITHOTRIPSY OF URETERAL CALCULUS AND STENT INSERTION Left 2/21/2024    Procedure: " CYSTOURETEROSCOPY, WITH HOLMIUM LASER LITHOTRIPSY OF URETERAL CALCULUS AND STENT INSERTION;  Surgeon: Reinaldo Ervin MD;  Location: [facility] OR;  Service: Urology;  Laterality: Left;  Please have semirigid ureteroscope, digital flexible ureteroscope, ellis laser, and tech (Maurice) if available.    EXTRACTION - STONE Left 2/21/2024    Procedure: EXTRACTION - STONE;  Surgeon: Reinaldo Ervin MD;  Location: [facility] OR;  Service: Urology;  Laterality: Left;    TRANSURETHRAL RESECTION OF PROSTATE N/A 1/24/2024    Procedure: TURP (TRANSURETHRAL RESECTION OF PROSTATE);  Surgeon: Reinaldo Ervin MD;  Location: [facility] OR;  Service: Urology;  Laterality: N/A;  Bipolar TURBT, 1 hr       Family History:   Family History   Problem Relation Name Age of Onset    Breast cancer Sister Ms. Marquez        Social History:  reports that he has been smoking cigarettes. He has a 17.5 pack-year smoking history. He has never used smokeless tobacco. He reports that he does not drink alcohol and does not use drugs.    Allergies:  Review of patient's allergies indicates:  No Known Allergies    Medications:  Current Outpatient Medications   Medication Sig Dispense Refill    abiraterone (ZYTIGA) 250 mg Tab Take 1 tablet (250 mg total) by mouth once daily. 30 tablet 11    atorvastatin (LIPITOR) 20 MG tablet Take 1 tablet (20 mg total) by mouth nightly. 90 tablet 3    bicalutamide (CASODEX) 50 MG Tab Take 1 tablet (50 mg total) by mouth once daily. 30 tablet 11    cholecalciferol, vitamin D3, 125 mcg (5,000 unit) Tab Take 1 tablet (5,000 Units total) by mouth once daily.      finasteride (PROSCAR) 5 mg tablet Take 1 tablet (5 mg total) by mouth once daily. 90 tablet 3    glimepiride (AMARYL) 2 MG tablet Take 1 tablet (2 mg total) by mouth before breakfast. 90 tablet 3    [START ON 5/15/2024] hepatitis B virus vacc.rec,PF, (ENGERIX-B) 20 mcg/mL Syrg Inject 1 mL (20 mcg total) into the muscle once. for 1 dose 1 mL 0    levothyroxine (SYNTHROID) 137 MCG Tab tablet  Take 1 tablet (137 mcg total) by mouth once daily. 90 tablet 3    losartan (COZAAR) 25 MG tablet Take 1 tablet (25 mg total) by mouth nightly. 90 tablet 3    metFORMIN (GLUCOPHAGE) 500 MG tablet Take 1 tablet (500 mg total) by mouth 2 (two) times daily. 180 tablet 3    predniSONE (DELTASONE) 5 MG tablet Take 1 tablet (5 mg total) by mouth once daily. 30 tablet 11    tamsulosin (FLOMAX) 0.4 mg Cap Take 1 capsule (0.4 mg total) by mouth once daily. 90 capsule 3     No current facility-administered medications for this visit.       Review of Systems   Constitutional:  Positive for fatigue.   HENT:  Negative for sore throat.    Eyes:  Negative for visual disturbance.   Respiratory:  Negative for shortness of breath.    Cardiovascular:  Negative for chest pain.   Gastrointestinal:  Negative for abdominal pain.   Genitourinary:  Negative for dysuria.   Musculoskeletal:  Negative for back pain.   Skin:  Negative for rash.   Neurological:  Negative for headaches.   Hematological:  Negative for adenopathy.   Psychiatric/Behavioral:  The patient is not nervous/anxious.        ECOG Performance Status:   ECOG SCORE    1 - Restricted in strenuous activity-ambulatory and able to carry out work of a light nature         Objective:      Vitals:   Vitals:    05/08/24 0938   BP: 137/73   BP Location: Right arm   Patient Position: Sitting   BP Method: Medium (Automatic)   Pulse: 70   SpO2: 97%   Weight: 86.1 kg (189 lb 13.1 oz)       BMI: Body mass index is 26.47 kg/m².    Physical Exam  Vitals and nursing note reviewed.   Constitutional:       Appearance: He is well-developed.   HENT:      Head: Normocephalic and atraumatic.   Eyes:      Pupils: Pupils are equal, round, and reactive to light.   Cardiovascular:      Rate and Rhythm: Normal rate and regular rhythm.   Pulmonary:      Effort: Pulmonary effort is normal.      Breath sounds: Normal breath sounds.   Abdominal:      General: Bowel sounds are normal.      Palpations: Abdomen  "is soft.   Musculoskeletal:         General: Normal range of motion.      Cervical back: Normal range of motion and neck supple.   Skin:     General: Skin is warm and dry.   Neurological:      Mental Status: He is alert and oriented to person, place, and time.   Psychiatric:         Behavior: Behavior normal.         Thought Content: Thought content normal.         Judgment: Judgment normal.         Laboratory Data:  Labs have been reviewed.    Lab Results   Component Value Date    WBC 7.06 05/08/2024    HGB 13.1 (L) 05/08/2024    HCT 39.6 (L) 05/08/2024    MCV 94 05/08/2024     05/08/2024                 Imaging:    PSMA scan (1/4/24): I have personally reviewed the images    Findings compatible with biopsy-proven prostate malignancy with bladder invasion.  Tracer-avid lesions concerning for supraclavicular, thoracic, abdominopelvic, and osseous metastases as detailed above.     Moderate left hydroureteronephrosis to the level of a 1.4 cm proximal ureteral stone.    MRI prostate (12/11/23):  Diffusely abnormal appearance of the prostate highly concerning for malignancy (PI-RADS 5).  Suspected involvement of the bilateral seminal vesicles.     Polypoid mass in the right anterolateral bladder wall concerning for involvement of the bladder wall, though primary bladder malignancy not excluded.     Suspicious right pelvic lymph node.      Assessment:       1. Prostate cancer    2. Secondary malignant neoplasm of bone    3. 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 Anemia in neoplastic disease    5. Hypertension associated with type 2 diabetes mellitus           Plan:     Prostate cancer  - I have reviewed his chart  - presenting symptoms were nocturia, lower urinary tract symptoms  - MRI prostate (12/11/23) revealed "  Diffusely abnormal appearance of the prostate highly concerning for malignancy (PI-RADS 5).  Suspected involvement of the bilateral seminal vesicles" as well as a " "suspicious right pelvic lymph node.  - PSA (12/27/23) was elevated at 13 ng/mL, 26 when corrected for finasteride  - biopsy of prostate (12/27/23) revealed [place] 3+4=7 (Grade 2) prostate cancer in all biopsy sites.  - PSMA scan (1/4/24) revealed "biopsy-proven prostate malignancy with bladder invasion.  Tracer-avid lesions concerning for supraclavicular, thoracic, abdominopelvic, and osseous metastases."  - he began bicalutamide in January 2024.  - he underwent TURBT/TURP on 1/24/24.  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  - given the volume of disease, I recommended switching from bicalutamide to abiraterone. He states the abiraterone costs about 200 dollars/month and is asking about taking bicalutamide while he is in [place] over the next 3 months. I think this is okay.  - Labs have been reviewed. Hemoglobin is 13.1 g/dL. PSA is 0.04 ng/mL  - okay to proceed with leuprolide today.  - recommend dental clearance in order to proceed with denosumab q12 weeks. We will discuss more at next visit.  - return to clinic in 3 months in preparation for next dose of Eligard.    2. Hypertension  - blood pressure is normal today.      3. Advance Care Planning     Date: 01/18/2024    Power of   After our discussion (at previous visit), the patient decided to complete a HCPOA and appointed his  wife Taniya Marquez ([phone]) and friend Ernesto Peters ([phone]) .         - return to clinic in 3 months in preparation for next dose of Eligard.    Yasir Chi M.D.  Hematology/Oncology  [facility] - [address], [address]  [place] [address]  Phone: [phone]  Fax: [phone]  "

## 2024-05-08 ENCOUNTER — INFUSION (OUTPATIENT)
Dept: INFUSION THERAPY | Facility: HOSPITAL | Age: 64
End: 2024-05-08
Attending: INTERNAL MEDICINE
Payer: MEDICARE

## 2024-05-08 ENCOUNTER — OFFICE VISIT (OUTPATIENT)
Dept: HEMATOLOGY/ONCOLOGY | Facility: CLINIC | Age: 64
End: 2024-05-08
Payer: MEDICARE

## 2024-05-08 VITALS
SYSTOLIC BLOOD PRESSURE: 137 MMHG | DIASTOLIC BLOOD PRESSURE: 73 MMHG | TEMPERATURE: 98 F | OXYGEN SATURATION: 97 % | HEIGHT: 71 IN | BODY MASS INDEX: 26.57 KG/M2 | RESPIRATION RATE: 18 BRPM | WEIGHT: 189.81 LBS | HEART RATE: 70 BPM

## 2024-05-08 VITALS
OXYGEN SATURATION: 97 % | DIASTOLIC BLOOD PRESSURE: 73 MMHG | BODY MASS INDEX: 26.47 KG/M2 | HEART RATE: 70 BPM | WEIGHT: 189.81 LBS | SYSTOLIC BLOOD PRESSURE: 137 MMHG

## 2024-05-08 DIAGNOSIS — E11.59 HYPERTENSION ASSOCIATED WITH TYPE 2 DIABETES MELLITUS: ICD-10-CM

## 2024-05-08 DIAGNOSIS — I15.2 HYPERTENSION ASSOCIATED WITH TYPE 2 DIABETES MELLITUS: ICD-10-CM

## 2024-05-08 DIAGNOSIS — D63.0 ANEMIA IN NEOPLASTIC DISEASE: ICD-10-CM

## 2024-05-08 DIAGNOSIS — C77.8 SECONDARY AND MALIGNANT NEOPLASM OF LYMPH NODES OF MULTIPLE SITES: ICD-10-CM

## 2024-05-08 DIAGNOSIS — C61 PROSTATE CANCER: Primary | ICD-10-CM

## 2024-05-08 DIAGNOSIS — C79.51 SECONDARY MALIGNANT NEOPLASM OF BONE: ICD-10-CM

## 2024-05-08 PROCEDURE — 4010F ACE/ARB THERAPY RXD/TAKEN: CPT | Mod: CPTII,S$GLB,, | Performed by: INTERNAL MEDICINE

## 2024-05-08 PROCEDURE — 1126F AMNT PAIN NOTED NONE PRSNT: CPT | Mod: CPTII,S$GLB,, | Performed by: INTERNAL MEDICINE

## 2024-05-08 PROCEDURE — 96402 CHEMO HORMON ANTINEOPL SQ/IM: CPT

## 2024-05-08 PROCEDURE — 99999 PR PBB SHADOW E&M-EST. PATIENT-LVL III: CPT | Mod: PBBFAC,,, | Performed by: INTERNAL MEDICINE

## 2024-05-08 PROCEDURE — 1101F PT FALLS ASSESS-DOCD LE1/YR: CPT | Mod: CPTII,S$GLB,, | Performed by: INTERNAL MEDICINE

## 2024-05-08 PROCEDURE — 1159F MED LIST DOCD IN RCRD: CPT | Mod: CPTII,S$GLB,, | Performed by: INTERNAL MEDICINE

## 2024-05-08 PROCEDURE — 1160F RVW MEDS BY RX/DR IN RCRD: CPT | Mod: CPTII,S$GLB,, | Performed by: INTERNAL MEDICINE

## 2024-05-08 PROCEDURE — 3078F DIAST BP <80 MM HG: CPT | Mod: CPTII,S$GLB,, | Performed by: INTERNAL MEDICINE

## 2024-05-08 PROCEDURE — 99215 OFFICE O/P EST HI 40 MIN: CPT | Mod: S$GLB,,, | Performed by: INTERNAL MEDICINE

## 2024-05-08 PROCEDURE — 1157F ADVNC CARE PLAN IN RCRD: CPT | Mod: CPTII,S$GLB,, | Performed by: INTERNAL MEDICINE

## 2024-05-08 PROCEDURE — 63600175 PHARM REV CODE 636 W HCPCS: Mod: JZ,JG | Performed by: INTERNAL MEDICINE

## 2024-05-08 PROCEDURE — 3008F BODY MASS INDEX DOCD: CPT | Mod: CPTII,S$GLB,, | Performed by: INTERNAL MEDICINE

## 2024-05-08 PROCEDURE — 3288F FALL RISK ASSESSMENT DOCD: CPT | Mod: CPTII,S$GLB,, | Performed by: INTERNAL MEDICINE

## 2024-05-08 PROCEDURE — 3075F SYST BP GE 130 - 139MM HG: CPT | Mod: CPTII,S$GLB,, | Performed by: INTERNAL MEDICINE

## 2024-05-08 RX ORDER — BICALUTAMIDE 50 MG/1
50 TABLET, FILM COATED ORAL DAILY
Qty: 90 TABLET | Refills: 3 | Status: SHIPPED | OUTPATIENT
Start: 2024-05-08 | End: 2025-05-08

## 2024-05-08 RX ADMIN — LEUPROLIDE ACETATE 22.5 MG: 22.5 INJECTION, SUSPENSION, EXTENDED RELEASE SUBCUTANEOUS at 10:05

## 2024-05-08 NOTE — PLAN OF CARE
Patient tolerated cycle 2 Eligard injection to left lower quad abd well, no pain or complaints at this time. Next appointment scheduled. AVS and treatment calendar printed and reviewed. Pt d/c in no acute distress.

## 2024-07-03 DIAGNOSIS — E11.9 TYPE 2 DIABETES MELLITUS WITHOUT COMPLICATION: ICD-10-CM

## 2024-08-13 ENCOUNTER — TELEPHONE (OUTPATIENT)
Dept: INFUSION THERAPY | Facility: HOSPITAL | Age: 64
End: 2024-08-13
Payer: MEDICARE

## 2024-08-13 NOTE — TELEPHONE ENCOUNTER
Pt's family called to reschedule office visit and injection. Confirmed rescheduled appt for 9/17 at 820 with Dr. Chi. Injection to follow office visit.

## 2024-09-09 ENCOUNTER — HOSPITAL ENCOUNTER (OUTPATIENT)
Dept: RADIOLOGY | Facility: HOSPITAL | Age: 64
Discharge: HOME | End: 2024-09-09
Attending: UROLOGY
Payer: MEDICARE

## 2024-09-09 DIAGNOSIS — N13.8 BPH WITH OBSTRUCTION/LOWER URINARY TRACT SYMPTOMS: ICD-10-CM

## 2024-09-09 DIAGNOSIS — C61 PROSTATE CANCER METASTATIC TO BONE: ICD-10-CM

## 2024-09-09 DIAGNOSIS — N40.1 BPH WITH OBSTRUCTION/LOWER URINARY TRACT SYMPTOMS: ICD-10-CM

## 2024-09-09 DIAGNOSIS — C79.51 PROSTATE CANCER METASTATIC TO BONE: ICD-10-CM

## 2024-09-09 DIAGNOSIS — N20.1 LEFT URETERAL STONE: ICD-10-CM

## 2024-09-09 DIAGNOSIS — N20.0 RENAL STONES: ICD-10-CM

## 2024-09-09 PROCEDURE — 74018 RADEX ABDOMEN 1 VIEW: CPT | Mod: 26,,, | Performed by: INTERNAL MEDICINE

## 2024-09-09 PROCEDURE — 76770 US EXAM ABDO BACK WALL COMP: CPT | Mod: TC

## 2024-09-09 PROCEDURE — 76770 US EXAM ABDO BACK WALL COMP: CPT | Mod: 26,,, | Performed by: INTERNAL MEDICINE

## 2024-09-09 PROCEDURE — 74018 RADEX ABDOMEN 1 VIEW: CPT | Mod: TC,FY

## 2024-09-11 NOTE — PROGRESS NOTES
"PATIENT: Waldemar Marquez  MRN: [id number]  DATE: 9/17/2024    Diagnosis:   1. Prostate cancer    2. 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 Secondary malignant neoplasm of bone    4. Anemia in neoplastic disease    5. Hypertension associated with type 2 diabetes mellitus      Chief Complaint: Prostate Cancer    Oncologic History:      Oncologic History Prostate cancer      Oncologic Treatment Bicalutamide  Eligard      Pathology 1/24/24:  PROSTATE CHIPS, TRANSURETHRAL RESECTION:   - Prostatic adenocarcinoma, Ursula score 4+3=7 (Grade group 3) with tertiary pattern 5 involving 95% of the totally submitted specimen.   - Percentage of pattern 4 = 60%.   - Percentage of pattern 5 = <5%.   - Cribriform glands are present.   - Perineural invasion is present.   - Intraductal carcinoma is present and included in the percentage of pattern 4.     12/27/23:  BIOPSIES OF 1. RIGHT BASE, 2. RIGHT MID, 3. RIGHT APEX, 4. LEFT APEX, 5. LEFT MID, 6. LEFT BASE OF THE PROSTATE:   ADENOCARCINOMA TOTAL URSULA SCORE 7 (3+4)--GRADE GROUP 2       Subjective:    History of Present Illness: Mr. Marquez is a 63 y.o. male who presented in January 2024 for evaluation and management of prostate cancer. He is referred by Dr. Ervin.    - presenting symptoms were nocturia, lower urinary tract symptoms  - MRI prostate (12/11/23) revealed "  Diffusely abnormal appearance of the prostate highly concerning for malignancy (PI-RADS 5).  Suspected involvement of the bilateral seminal vesicles" as well as a suspicious right pelvic lymph node.  - PSA (12/27/23) was elevated at 13 ng/mL, 26 when corrected for finasteride  - biopsy of prostate (12/27/23) revealed Cyclone 3+4=7 (Grade 2) prostate cancer in all biopsy sites.  - PSMA scan (1/4/24) revealed "biopsy-proven prostate malignancy with bladder invasion.  Tracer-avid lesions concerning for supraclavicular, thoracic, abdominopelvic, and osseous metastases."  - per Dr. Ervin's " "note dated 1/10/24:  "Needs cystoscopy and TURBT and TURP to de-obstruct him, also need ureteroscopy and laser lithotripsy and stent placement."     - he began bicalutamide in January 2024.    Interval history:  - he presents for a follow-up appointment for his prostate cancer.  - during trip to [place], he took bicalutamide, which is cheaper. He has not gone back on enzalutamide since the trip. He is currently taking bicalutamide. He would like to continue bicalutamide for now.  - today, he is doing well. He is scheduled for [facility] today.. he endorses fatigue. He denies pain, weight loss, shortness of breath, chest pain, nausea, vomiting, diarrhea, constipation.       Past medical, surgical, family, and social histories have been reviewed and updated below.    Past Medical History:   Past Medical History:   Diagnosis Date    Dyslipidemia 02/25/2013    Hypertension associated with type 2 diabetes mellitus 12/19/2023    Kidney stone 12/19/2023    Postablative hypothyroidism 02/25/2013    radioactive ablation    Prostate cancer 01/16/2024    Type 2 diabetes mellitus without complication, without long-term current use of insulin 03/14/2011    Vitamin D deficiency 02/25/2013       Past Surgical History:   Past Surgical History:   Procedure Laterality Date    CYSTOSCOPY WITH URETEROSCOPY, RETROGRADE PYELOGRAPHY, AND INSERTION OF STENT Left 1/24/2024    Procedure: CYSTOSCOPY, WITH RETROGRADE PYELOGRAM AND URETERAL STENT INSERTION;  Surgeon: Reinaldo Ervin MD;  Location: [facility] OR;  Service: Urology;  Laterality: Left;    CYSTOSCOPY WITH URETEROSCOPY, RETROGRADE PYELOGRAPHY, AND INSERTION OF STENT Left 2/21/2024    Procedure: CYSTOSCOPY, WITH RETROGRADE PYELOGRAM AND URETERAL STENT INSERTION;  Surgeon: Reinaldo Ervin MD;  Location: [facility] OR;  Service: Urology;  Laterality: Left;    CYSTOURETEROSCOPY, WITH HOLMIUM LASER LITHOTRIPSY OF URETERAL CALCULUS AND STENT INSERTION Left 2/21/2024    Procedure: CYSTOURETEROSCOPY, WITH " HOLMIUM LASER LITHOTRIPSY OF URETERAL CALCULUS AND STENT INSERTION;  Surgeon: Reinaldo Ervin MD;  Location: [facility] OR;  Service: Urology;  Laterality: Left;  Please have semirigid ureteroscope, digital flexible ureteroscope, ellis laser, and tech (Maurice) if available.    EXTRACTION - STONE Left 2/21/2024    Procedure: EXTRACTION - STONE;  Surgeon: Reinaldo Ervin MD;  Location: [facility] OR;  Service: Urology;  Laterality: Left;    TRANSURETHRAL RESECTION OF PROSTATE N/A 1/24/2024    Procedure: TURP (TRANSURETHRAL RESECTION OF PROSTATE);  Surgeon: Reinaldo Ervin MD;  Location: [facility] OR;  Service: Urology;  Laterality: N/A;  Bipolar TURBT, 1 hr       Family History:   Family History   Problem Relation Name Age of Onset    Breast cancer Sister Ms. Marquez        Social History:  reports that he has been smoking cigarettes. He has a 17.5 pack-year smoking history. He has never used smokeless tobacco. He reports that he does not drink alcohol and does not use drugs.    Allergies:  Review of patient's allergies indicates:  No Known Allergies    Medications:  Current Outpatient Medications   Medication Sig Dispense Refill    abiraterone (ZYTIGA) 250 mg Tab Take 1 tablet (250 mg total) by mouth once daily. 30 tablet 11    atorvastatin (LIPITOR) 20 MG tablet Take 1 tablet (20 mg total) by mouth nightly. 90 tablet 3    bicalutamide (CASODEX) 50 MG Tab Take 1 tablet (50 mg total) by mouth once daily. 90 tablet 3    cholecalciferol, vitamin D3, 125 mcg (5,000 unit) Tab Take 1 tablet (5,000 Units total) by mouth once daily.      finasteride (PROSCAR) 5 mg tablet Take 1 tablet (5 mg total) by mouth once daily. 90 tablet 3    glimepiride (AMARYL) 2 MG tablet Take 1 tablet (2 mg total) by mouth before breakfast. 90 tablet 3    levothyroxine (SYNTHROID) 137 MCG Tab tablet Take 1 tablet (137 mcg total) by mouth once daily. 90 tablet 3    losartan (COZAAR) 25 MG tablet Take 1 tablet (25 mg total) by mouth nightly. 90 tablet 3    metFORMIN  (GLUCOPHAGE) 500 MG tablet Take 1 tablet (500 mg total) by mouth 2 (two) times daily. 180 tablet 3    predniSONE (DELTASONE) 5 MG tablet Take 1 tablet (5 mg total) by mouth once daily. 30 tablet 11    tamsulosin (FLOMAX) 0.4 mg Cap Take 1 capsule (0.4 mg total) by mouth once daily. 90 capsule 3     No current facility-administered medications for this visit.       Review of Systems   Constitutional:  Positive for fatigue.   HENT:  Negative for sore throat.    Eyes:  Negative for visual disturbance.   Respiratory:  Negative for shortness of breath.    Cardiovascular:  Negative for chest pain.   Gastrointestinal:  Negative for abdominal pain.   Genitourinary:  Negative for dysuria.   Musculoskeletal:  Negative for back pain.   Skin:  Negative for rash.   Neurological:  Negative for headaches.   Hematological:  Negative for adenopathy.   Psychiatric/Behavioral:  The patient is not nervous/anxious.        ECOG Performance Status:   ECOG SCORE             Objective:      Vitals:   There were no vitals filed for this visit.      BMI: There is no height or weight on file to calculate BMI.    Physical Exam  Vitals and nursing note reviewed.   Constitutional:       Appearance: He is well-developed.   HENT:      Head: Normocephalic and atraumatic.   Eyes:      Pupils: Pupils are equal, round, and reactive to light.   Cardiovascular:      Rate and Rhythm: Normal rate and regular rhythm.   Pulmonary:      Effort: Pulmonary effort is normal.      Breath sounds: Normal breath sounds.   Abdominal:      General: Bowel sounds are normal.      Palpations: Abdomen is soft.   Musculoskeletal:         General: Normal range of motion.      Cervical back: Normal range of motion and neck supple.   Skin:     General: Skin is warm and dry.   Neurological:      Mental Status: He is alert and oriented to person, place, and time.   Psychiatric:         Behavior: Behavior normal.         Thought Content: Thought content normal.         Judgment:  "Judgment normal.         Laboratory Data:  Labs have been reviewed.    Lab Results   Component Value Date    WBC 6.91 09/16/2024    HGB 12.7 (L) 09/16/2024    HCT 37.6 (L) 09/16/2024    MCV 93 09/16/2024     09/16/2024                 Imaging:    PSMA scan (1/4/24): I have personally reviewed the images    Findings compatible with biopsy-proven prostate malignancy with bladder invasion.  Tracer-avid lesions concerning for supraclavicular, thoracic, abdominopelvic, and osseous metastases as detailed above.     Moderate left hydroureteronephrosis to the level of a 1.4 cm proximal ureteral stone.    MRI prostate (12/11/23):  Diffusely abnormal appearance of the prostate highly concerning for malignancy (PI-RADS 5).  Suspected involvement of the bilateral seminal vesicles.     Polypoid mass in the right anterolateral bladder wall concerning for involvement of the bladder wall, though primary bladder malignancy not excluded.     Suspicious right pelvic lymph node.      Assessment:       1. Prostate cancer    2. 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 Secondary malignant neoplasm of bone    4. Anemia in neoplastic disease    5. Hypertension associated with type 2 diabetes mellitus           Plan:     Prostate cancer  - I have reviewed his chart  - presenting symptoms were nocturia, lower urinary tract symptoms  - MRI prostate (12/11/23) revealed "  Diffusely abnormal appearance of the prostate highly concerning for malignancy (PI-RADS 5).  Suspected involvement of the bilateral seminal vesicles" as well as a suspicious right pelvic lymph node.  - PSA (12/27/23) was elevated at 13 ng/mL, 26 when corrected for finasteride  - biopsy of prostate (12/27/23) revealed Ursula 3+4=7 (Grade 2) prostate cancer in all biopsy sites.  - PSMA scan (1/4/24) revealed "biopsy-proven prostate malignancy with bladder invasion.  Tracer-avid lesions concerning for supraclavicular, thoracic, " "abdominopelvic, and osseous metastases."  - he began bicalutamide in January 2024.  - he underwent TURBT/TURP on 1/24/24.  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  - during trip to [place], he took bicalutamide, which is cheaper. He has not gone back on enzalutamide since the trip. He is currently taking bicalutamide. He would like to continue bicalutamide for now.  - Labs have been reviewed. Hemoglobin is 13.1 g/dL. PSA is 0.03 ng/mL  - okay to proceed with leuprolide today.  - recommend dental clearance in order to proceed with denosumab q12 weeks.    - return to clinic in 3 months in preparation for next dose of Eligard.    2. Hypertension  - blood pressure is normal today.    3. Anemia due to cancer  - Labs have been reviewed. Hemoglobin is 12.7 g/dL.  - continue to monitor    4. Advance Care Planning     Date: 01/18/2024    Power of   After our discussion (at previous visit), the patient decided to complete a HCPOA and appointed his  wife Taniya Marquez ([phone]) and friend Ernesto Peters ([phone]) .         - return to clinic in 3 months in preparation for next dose of Eligard.    Yasir Chi M.D.  Hematology/Oncology  [facility] - [address], [address]  [place] [address]  Phone: [phone]  Fax: [phone]  "

## 2024-09-16 ENCOUNTER — OFFICE VISIT (OUTPATIENT)
Dept: UROLOGY | Facility: CLINIC | Age: 64
End: 2024-09-16
Payer: MEDICARE

## 2024-09-16 ENCOUNTER — LAB VISIT (OUTPATIENT)
Dept: LAB | Facility: HOSPITAL | Age: 64
End: 2024-09-16
Attending: UROLOGY
Payer: MEDICARE

## 2024-09-16 VITALS
BODY MASS INDEX: 27.19 KG/M2 | HEIGHT: 71 IN | WEIGHT: 194.25 LBS | SYSTOLIC BLOOD PRESSURE: 126 MMHG | DIASTOLIC BLOOD PRESSURE: 72 MMHG | HEART RATE: 66 BPM

## 2024-09-16 DIAGNOSIS — C61 PROSTATE CANCER: ICD-10-CM

## 2024-09-16 DIAGNOSIS — N13.30 HYDRONEPHROSIS OF LEFT KIDNEY: ICD-10-CM

## 2024-09-16 DIAGNOSIS — N20.1 LEFT URETERAL STONE: ICD-10-CM

## 2024-09-16 DIAGNOSIS — R10.30 LOWER ABDOMINAL PAIN: ICD-10-CM

## 2024-09-16 DIAGNOSIS — C61 PROSTATE CANCER: Primary | ICD-10-CM

## 2024-09-16 LAB
ALBUMIN SERPL BCP-MCNC: 4 G/DL (ref 3.5–5.2)
ALP SERPL-CCNC: 70 U/L (ref 55–135)
ALT SERPL W/O P-5'-P-CCNC: 30 U/L (ref 10–44)
ANION GAP SERPL CALC-SCNC: 11 MMOL/L (ref 8–16)
AST SERPL-CCNC: 20 U/L (ref 10–40)
BASOPHILS # BLD AUTO: 0.06 K/UL (ref 0–0.2)
BASOPHILS NFR BLD: 0.9 % (ref 0–1.9)
BILIRUB SERPL-MCNC: 0.3 MG/DL (ref 0.1–1)
BUN SERPL-MCNC: 30 MG/DL (ref 8–23)
CALCIUM SERPL-MCNC: 9.8 MG/DL (ref 8.7–10.5)
CHLORIDE SERPL-SCNC: 104 MMOL/L (ref 95–110)
CO2 SERPL-SCNC: 22 MMOL/L (ref 23–29)
COMPLEXED PSA SERPL-MCNC: 0.03 NG/ML (ref 0–4)
CREAT SERPL-MCNC: 1.6 MG/DL (ref 0.5–1.4)
DIFFERENTIAL METHOD BLD: ABNORMAL
EOSINOPHIL # BLD AUTO: 0.4 K/UL (ref 0–0.5)
EOSINOPHIL NFR BLD: 5.2 % (ref 0–8)
ERYTHROCYTE [DISTWIDTH] IN BLOOD BY AUTOMATED COUNT: 12.5 % (ref 11.5–14.5)
EST. GFR  (NO RACE VARIABLE): 48 ML/MIN/1.73 M^2
GLUCOSE SERPL-MCNC: 172 MG/DL (ref 70–110)
HCT VFR BLD AUTO: 37.6 % (ref 40–54)
HGB BLD-MCNC: 12.7 G/DL (ref 14–18)
IMM GRANULOCYTES # BLD AUTO: 0.04 K/UL (ref 0–0.04)
IMM GRANULOCYTES NFR BLD AUTO: 0.6 % (ref 0–0.5)
LYMPHOCYTES # BLD AUTO: 2 K/UL (ref 1–4.8)
LYMPHOCYTES NFR BLD: 28.8 % (ref 18–48)
MCH RBC QN AUTO: 31.5 PG (ref 27–31)
MCHC RBC AUTO-ENTMCNC: 33.8 G/DL (ref 32–36)
MCV RBC AUTO: 93 FL (ref 82–98)
MONOCYTES # BLD AUTO: 0.8 K/UL (ref 0.3–1)
MONOCYTES NFR BLD: 10.9 % (ref 4–15)
NEUTROPHILS # BLD AUTO: 3.7 K/UL (ref 1.8–7.7)
NEUTROPHILS NFR BLD: 53.6 % (ref 38–73)
NRBC BLD-RTO: 0 /100 WBC
PLATELET # BLD AUTO: 236 K/UL (ref 150–450)
PMV BLD AUTO: 9.6 FL (ref 9.2–12.9)
POTASSIUM SERPL-SCNC: 4.2 MMOL/L (ref 3.5–5.1)
PROT SERPL-MCNC: 8.1 G/DL (ref 6–8.4)
RBC # BLD AUTO: 4.03 M/UL (ref 4.6–6.2)
SODIUM SERPL-SCNC: 137 MMOL/L (ref 136–145)
TESTOST SERPL-MCNC: 14 NG/DL (ref 304–1227)
WBC # BLD AUTO: 6.91 K/UL (ref 3.9–12.7)

## 2024-09-16 PROCEDURE — 4010F ACE/ARB THERAPY RXD/TAKEN: CPT | Mod: CPTII,S$GLB,, | Performed by: UROLOGY

## 2024-09-16 PROCEDURE — 3074F SYST BP LT 130 MM HG: CPT | Mod: CPTII,S$GLB,, | Performed by: UROLOGY

## 2024-09-16 PROCEDURE — 99999 PR PBB SHADOW E&M-EST. PATIENT-LVL IV: CPT | Mod: PBBFAC,,, | Performed by: UROLOGY

## 2024-09-16 PROCEDURE — 1101F PT FALLS ASSESS-DOCD LE1/YR: CPT | Mod: CPTII,S$GLB,, | Performed by: UROLOGY

## 2024-09-16 PROCEDURE — 80053 COMPREHEN METABOLIC PANEL: CPT | Performed by: INTERNAL MEDICINE

## 2024-09-16 PROCEDURE — 99214 OFFICE O/P EST MOD 30 MIN: CPT | Mod: S$GLB,,, | Performed by: UROLOGY

## 2024-09-16 PROCEDURE — 84403 ASSAY OF TOTAL TESTOSTERONE: CPT | Performed by: INTERNAL MEDICINE

## 2024-09-16 PROCEDURE — 1157F ADVNC CARE PLAN IN RCRD: CPT | Mod: CPTII,S$GLB,, | Performed by: UROLOGY

## 2024-09-16 PROCEDURE — 3008F BODY MASS INDEX DOCD: CPT | Mod: CPTII,S$GLB,, | Performed by: UROLOGY

## 2024-09-16 PROCEDURE — 3288F FALL RISK ASSESSMENT DOCD: CPT | Mod: CPTII,S$GLB,, | Performed by: UROLOGY

## 2024-09-16 PROCEDURE — 3078F DIAST BP <80 MM HG: CPT | Mod: CPTII,S$GLB,, | Performed by: UROLOGY

## 2024-09-16 PROCEDURE — 1126F AMNT PAIN NOTED NONE PRSNT: CPT | Mod: CPTII,S$GLB,, | Performed by: UROLOGY

## 2024-09-16 PROCEDURE — 85025 COMPLETE CBC W/AUTO DIFF WBC: CPT | Performed by: INTERNAL MEDICINE

## 2024-09-16 PROCEDURE — 36415 COLL VENOUS BLD VENIPUNCTURE: CPT | Performed by: INTERNAL MEDICINE

## 2024-09-16 PROCEDURE — 84153 ASSAY OF PSA TOTAL: CPT | Performed by: INTERNAL MEDICINE

## 2024-09-16 PROCEDURE — 1159F MED LIST DOCD IN RCRD: CPT | Mod: CPTII,S$GLB,, | Performed by: UROLOGY

## 2024-09-16 NOTE — PROGRESS NOTES
[facility] - Urology   Clinic Note    SUBJECTIVE:     Chief Complaint   Patient presents with    Kindey stones, imaging results        Referral from: No ref. provider found.    History of Present Illness:  Waldemar Marquez is a 63 y.o. male who presents to clinic for lower urinary tract symptoms and BPH.    Patient with bothersome LUTS, including urgency, weak stream, nocturia x 3 times per night. Denies any UTIs. No hematuria. No PSA on file. Has been on tamsulosin with little relief.    Previous medications for BPH include: yes: tamsulosin  AUA Symptom Score: pt unable to fill out, speaks cantonese  Previous prostatic surgery: No    12/04/2023  Here with elevated PSA. PSA is 26 corrected for finasteride.    01/10/2024  Prostate biopsy with Gl 3+4 disease  PSMA pet with osseous mets  Cysto with bladder invasion    PSA:  Lab Results   Component Value Date    PSA 1.2 12/16/2014    PSADIAG 0.04 04/29/2024    PSADIAG 1.1 02/08/2024    PSADIAG 13.0 (H) 11/27/2023    PSATOTAL 15.4 (H) 12/04/2023    PSAFREE 1.74 (H) 12/04/2023 09/16/2024  Here to review the results of his prostate MRI.    MRI Prostate:  Prostate Volume: 50 cc  PSA: 30.8  PSA Density: 0.61  Overall Assessment: PI-RADS 5, 1 targets (entire prostate)  Apparent extension into the bladder    IPSS: 25/5    09/16/2024  S/p TURP and left ureteral stent placement on 1/24/24  Doing well  Still with some hematuria and dysuria  Voiding better    Final Pathologic Diagnosis PROSTATE CHIPS, TRANSURETHRAL RESECTION:  - Prostatic adenocarcinoma, Austin score 4+3=7 (Grade group 3) with tertiary pattern 5 involving 95% of the totally submitted specimen.  - Percentage of pattern 4 = 60%.  - Percentage of pattern 5 = <5%.  - Cribriform glands are present.  - Perineural invasion is present.  - Intraductal carcinoma is present and included in the percentage of pattern 4.     02/29/2024  Here for stent removal  CT with some residual stone burden    04/29/2024  Doing well  Voiding  with a strong stream  Still on ADT, follows with med onc  Last PSA 1.1  Punctate ureteral stone still present with mild hydro    09/16/2024  Here for follow up  Still with hydro on US  Voiding without issue  Last PSA in April down to 0.04    PSA:  Lab Results   Component Value Date    PSA 1.2 12/16/2014    PSADIAG 0.04 04/29/2024    PSADIAG 1.1 02/08/2024    PSADIAG 13.0 (H) 11/27/2023    PSATOTAL 15.4 (H) 12/04/2023    PSAFREE 1.74 (H) 12/04/2023       Patient endorses no additional complaints at this time.    Past Medical History:   Diagnosis Date    Dyslipidemia 02/25/2013    Hypertension associated with type 2 diabetes mellitus 12/19/2023    Kidney stone 12/19/2023    Postablative hypothyroidism 02/25/2013    radioactive ablation    Prostate cancer 01/16/2024    Type 2 diabetes mellitus without complication, without long-term current use of insulin 03/14/2011    Vitamin D deficiency 02/25/2013       Past Surgical History:   Procedure Laterality Date    CYSTOSCOPY WITH URETEROSCOPY, RETROGRADE PYELOGRAPHY, AND INSERTION OF STENT Left 1/24/2024    Procedure: CYSTOSCOPY, WITH RETROGRADE PYELOGRAM AND URETERAL STENT INSERTION;  Surgeon: Reinaldo Ervin MD;  Location: [facility] OR;  Service: Urology;  Laterality: Left;    CYSTOSCOPY WITH URETEROSCOPY, RETROGRADE PYELOGRAPHY, AND INSERTION OF STENT Left 2/21/2024    Procedure: CYSTOSCOPY, WITH RETROGRADE PYELOGRAM AND URETERAL STENT INSERTION;  Surgeon: Reinaldo Ervin MD;  Location: [facility] OR;  Service: Urology;  Laterality: Left;    CYSTOURETEROSCOPY, WITH HOLMIUM LASER LITHOTRIPSY OF URETERAL CALCULUS AND STENT INSERTION Left 2/21/2024    Procedure: CYSTOURETEROSCOPY, WITH HOLMIUM LASER LITHOTRIPSY OF URETERAL CALCULUS AND STENT INSERTION;  Surgeon: Reinaldo Ervin MD;  Location: [facility] OR;  Service: Urology;  Laterality: Left;  Please have semirigid ureteroscope, digital flexible ureteroscope, ellis laser, and tech (Maurice) if available.    EXTRACTION - STONE Left 2/21/2024     Procedure: EXTRACTION - STONE;  Surgeon: Reinaldo Ervin MD;  Location: [facility] OR;  Service: Urology;  Laterality: Left;    TRANSURETHRAL RESECTION OF PROSTATE N/A 1/24/2024    Procedure: TURP (TRANSURETHRAL RESECTION OF PROSTATE);  Surgeon: Reinaldo Ervin MD;  Location: [facility] OR;  Service: Urology;  Laterality: N/A;  Bipolar TURBT, 1 hr       Family History   Problem Relation Name Age of Onset    Breast cancer Sister Ms. Marquez        Social History     Tobacco Use    Smoking status: Every Day     Current packs/day: 0.25     Average packs/day: 0.3 packs/day for 55.0 years (17.5 ttl pk-yrs)     Types: Cigarettes    Smokeless tobacco: Never   Substance Use Topics    Alcohol use: Never    Drug use: Never       Current Outpatient Medications on File Prior to Visit   Medication Sig Dispense Refill    abiraterone (ZYTIGA) 250 mg Tab Take 1 tablet (250 mg total) by mouth once daily. 30 tablet 11    atorvastatin (LIPITOR) 20 MG tablet Take 1 tablet (20 mg total) by mouth nightly. 90 tablet 3    bicalutamide (CASODEX) 50 MG Tab Take 1 tablet (50 mg total) by mouth once daily. 90 tablet 3    cholecalciferol, vitamin D3, 125 mcg (5,000 unit) Tab Take 1 tablet (5,000 Units total) by mouth once daily.      finasteride (PROSCAR) 5 mg tablet Take 1 tablet (5 mg total) by mouth once daily. 90 tablet 3    glimepiride (AMARYL) 2 MG tablet Take 1 tablet (2 mg total) by mouth before breakfast. 90 tablet 3    levothyroxine (SYNTHROID) 137 MCG Tab tablet Take 1 tablet (137 mcg total) by mouth once daily. 90 tablet 3    losartan (COZAAR) 25 MG tablet Take 1 tablet (25 mg total) by mouth nightly. 90 tablet 3    metFORMIN (GLUCOPHAGE) 500 MG tablet Take 1 tablet (500 mg total) by mouth 2 (two) times daily. 180 tablet 3    predniSONE (DELTASONE) 5 MG tablet Take 1 tablet (5 mg total) by mouth once daily. 30 tablet 11    tamsulosin (FLOMAX) 0.4 mg Cap Take 1 capsule (0.4 mg total) by mouth once daily. 90 capsule 3     No current  "facility-administered medications on file prior to visit.       Review of patient's allergies indicates:  No Known Allergies    Review of Systems:  A review of 10+ systems was conducted with pertinent positive and negative findings documented in HPI with all other systems reviewed and negative.    OBJECTIVE:     Estimated body mass index is 27.09 kg/m² as calculated from the following:    Height as of 5/8/24: 5' 11" (1.803 m).    Weight as of this encounter: 88.1 kg (194 lb 3.6 oz).    Vital Signs (Most Recent)  There were no vitals filed for this visit.      Physical Exam:  GENERAL: patient sitting comfortably  HEENT: normocephalic  NECK: supple, no JVD  PULM: normal chest rise, no increased WOB  HEART: non-diaphoretic  ABDO: soft, nondistended, nontender  BACK: no CVA tenderness bilaterally  SKIN: warm, dry, well perfused  EXT: no bruising or edema  NEURO: grossly normal with no focal deficits  PSYCH: appropriate mood and affect    Genitourinary Exam:  deferred    Lab Results   Component Value Date    BUN 26 (H) 05/08/2024    CREATININE 1.2 05/08/2024    WBC 7.06 05/08/2024    HGB 13.1 (L) 05/08/2024    HCT 39.6 (L) 05/08/2024     05/08/2024    AST 20 05/08/2024    ALT 27 05/08/2024    ALKPHOS 52 (L) 05/08/2024    ALBUMIN 3.5 05/08/2024    HGBA1C 6.9 (H) 12/20/2023        Imaging:  I have personally reviewed all relevant imaging studies.    No results found for this or any previous visit (from the past 2160 hour(s)).    No results found for this or any previous visit (from the past 2160 hour(s)).    US Retroperitoneal Complete  Narrative: EXAMINATION:  US RETROPERITONEAL COMPLETE    CLINICAL HISTORY:  Calculus of ureter    TECHNIQUE:  Ultrasound of the kidneys and urinary bladder was performed including color flow and Doppler evaluation of the kidneys.    COMPARISON:  CT April 2024, radiograph today    FINDINGS:  Right kidney: The right kidney measures 12.7 cm. Resistive index measures 0.61.  No mass. Two " renal calculi, the larger measures 6 mm.  No hydronephrosis.    Left kidney: The left kidney measures 11.5 cm. Resistive index measures 0.62.  No mass. 7 mm calculus.  Mild hydronephrosis.    The bladder is partially distended at the time of scanning.  No left ureteral jet is identified.  2 cm bladder lumen echogenic focus, of uncertain etiology, may represent intraluminal calculus.  Impression: Mild left hydronephrosis.  No left ureteral jet which is suggestive of asymmetric renal function.    Bilateral renal calculi    2 cm bladder lumen echogenic focus, of uncertain etiology, may represent calculus    Electronically signed by: Yomaira Lechuga MD  Date:    09/09/2024  Time:    11:09  X-Ray KUB  Narrative: EXAMINATION:  XR KUB    CLINICAL HISTORY:  Calculus of ureter    TECHNIQUE:  Single AP supine view of the abdomen (KUB) was performed    COMPARISON:  Radiograph August 2021, CT April 2024    FINDINGS:  The renal silhouettes are partly obscured due to bowel content.  There are bilateral renal calculi measuring up to 9 mm.  No definite calculus along the course of either ureter.    Bowel gas pattern demonstrates no bowel obstruction.  Osseous structures show degenerative change.  Impression: Bilateral renal calculi    Electronically signed by: Yomaira Lechuga MD  Date:    09/09/2024  Time:    10:19       PSA:  Lab Results   Component Value Date    PSA 1.2 12/16/2014    PSADIAG 0.04 04/29/2024    PSADIAG 1.1 02/08/2024    PSADIAG 13.0 (H) 11/27/2023    PSATOTAL 15.4 (H) 12/04/2023    PSAFREE 1.74 (H) 12/04/2023       Testosterone:  Lab Results   Component Value Date    TOTALTESTOST <4 (L) 04/29/2024        ASSESSMENT     1. Prostate cancer    2. Left ureteral stone          PLAN:     Nephrolihtiasis and punctate left ureteral stone, left hydronephrosis  We had a long discussion regarding options.  Does not desire intervention at this time. Understands risks of this. Will obtain CT in 6 mo.    2.   Prostate Cancer,  BPH  Voiding well and without issue. Continue with ADT with Medical Oncology. PSA/T upcoming. Will get PSMA PET in 6 mo    Reinaldo Ervin MD  Urology  [facility]    Disclaimer: This note has been generated using voice-recognition software. There may be typographical errors that have been missed during proof-reading.

## 2024-09-17 ENCOUNTER — INFUSION (OUTPATIENT)
Dept: INFUSION THERAPY | Facility: HOSPITAL | Age: 64
End: 2024-09-17
Attending: INTERNAL MEDICINE
Payer: MEDICARE

## 2024-09-17 ENCOUNTER — OFFICE VISIT (OUTPATIENT)
Dept: HEMATOLOGY/ONCOLOGY | Facility: CLINIC | Age: 64
End: 2024-09-17
Payer: MEDICARE

## 2024-09-17 VITALS
BODY MASS INDEX: 27.26 KG/M2 | TEMPERATURE: 98 F | OXYGEN SATURATION: 97 % | WEIGHT: 194.69 LBS | DIASTOLIC BLOOD PRESSURE: 60 MMHG | HEIGHT: 71 IN | HEART RATE: 67 BPM | SYSTOLIC BLOOD PRESSURE: 120 MMHG | RESPIRATION RATE: 16 BRPM

## 2024-09-17 VITALS
HEART RATE: 67 BPM | TEMPERATURE: 98 F | SYSTOLIC BLOOD PRESSURE: 120 MMHG | OXYGEN SATURATION: 97 % | RESPIRATION RATE: 16 BRPM | DIASTOLIC BLOOD PRESSURE: 60 MMHG

## 2024-09-17 DIAGNOSIS — C77.8 SECONDARY AND MALIGNANT NEOPLASM OF LYMPH NODES OF MULTIPLE SITES: ICD-10-CM

## 2024-09-17 DIAGNOSIS — D63.0 ANEMIA IN NEOPLASTIC DISEASE: ICD-10-CM

## 2024-09-17 DIAGNOSIS — N40.1 BENIGN PROSTATIC HYPERPLASIA WITH URINARY FREQUENCY: ICD-10-CM

## 2024-09-17 DIAGNOSIS — C61 PROSTATE CANCER: Primary | ICD-10-CM

## 2024-09-17 DIAGNOSIS — E11.59 HYPERTENSION ASSOCIATED WITH TYPE 2 DIABETES MELLITUS: ICD-10-CM

## 2024-09-17 DIAGNOSIS — I15.2 HYPERTENSION ASSOCIATED WITH TYPE 2 DIABETES MELLITUS: ICD-10-CM

## 2024-09-17 DIAGNOSIS — C79.51 SECONDARY MALIGNANT NEOPLASM OF BONE: ICD-10-CM

## 2024-09-17 DIAGNOSIS — R35.0 BENIGN PROSTATIC HYPERPLASIA WITH URINARY FREQUENCY: ICD-10-CM

## 2024-09-17 PROCEDURE — 99999 PR PBB SHADOW E&M-EST. PATIENT-LVL IV: CPT | Mod: PBBFAC,,, | Performed by: INTERNAL MEDICINE

## 2024-09-17 PROCEDURE — 3008F BODY MASS INDEX DOCD: CPT | Mod: CPTII,S$GLB,, | Performed by: INTERNAL MEDICINE

## 2024-09-17 PROCEDURE — 1157F ADVNC CARE PLAN IN RCRD: CPT | Mod: CPTII,S$GLB,, | Performed by: INTERNAL MEDICINE

## 2024-09-17 PROCEDURE — 3288F FALL RISK ASSESSMENT DOCD: CPT | Mod: CPTII,S$GLB,, | Performed by: INTERNAL MEDICINE

## 2024-09-17 PROCEDURE — 3074F SYST BP LT 130 MM HG: CPT | Mod: CPTII,S$GLB,, | Performed by: INTERNAL MEDICINE

## 2024-09-17 PROCEDURE — 1101F PT FALLS ASSESS-DOCD LE1/YR: CPT | Mod: CPTII,S$GLB,, | Performed by: INTERNAL MEDICINE

## 2024-09-17 PROCEDURE — 1160F RVW MEDS BY RX/DR IN RCRD: CPT | Mod: CPTII,S$GLB,, | Performed by: INTERNAL MEDICINE

## 2024-09-17 PROCEDURE — 96402 CHEMO HORMON ANTINEOPL SQ/IM: CPT

## 2024-09-17 PROCEDURE — 1159F MED LIST DOCD IN RCRD: CPT | Mod: CPTII,S$GLB,, | Performed by: INTERNAL MEDICINE

## 2024-09-17 PROCEDURE — 99215 OFFICE O/P EST HI 40 MIN: CPT | Mod: S$GLB,,, | Performed by: INTERNAL MEDICINE

## 2024-09-17 PROCEDURE — 63600175 PHARM REV CODE 636 W HCPCS: Mod: JZ,JG | Performed by: INTERNAL MEDICINE

## 2024-09-17 PROCEDURE — 3078F DIAST BP <80 MM HG: CPT | Mod: CPTII,S$GLB,, | Performed by: INTERNAL MEDICINE

## 2024-09-17 PROCEDURE — 4010F ACE/ARB THERAPY RXD/TAKEN: CPT | Mod: CPTII,S$GLB,, | Performed by: INTERNAL MEDICINE

## 2024-09-17 PROCEDURE — 1126F AMNT PAIN NOTED NONE PRSNT: CPT | Mod: CPTII,S$GLB,, | Performed by: INTERNAL MEDICINE

## 2024-09-17 RX ORDER — FINASTERIDE 5 MG/1
5 TABLET, FILM COATED ORAL DAILY
Qty: 90 TABLET | Refills: 3 | Status: SHIPPED | OUTPATIENT
Start: 2024-09-17 | End: 2025-09-17

## 2024-09-17 RX ORDER — BICALUTAMIDE 50 MG/1
50 TABLET, FILM COATED ORAL DAILY
Qty: 90 TABLET | Refills: 3 | Status: SHIPPED | OUTPATIENT
Start: 2024-09-17 | End: 2025-09-17

## 2024-09-17 RX ORDER — TAMSULOSIN HYDROCHLORIDE 0.4 MG/1
1 CAPSULE ORAL DAILY
Qty: 90 CAPSULE | Refills: 3 | Status: SHIPPED | OUTPATIENT
Start: 2024-09-17

## 2024-09-17 RX ADMIN — LEUPROLIDE ACETATE 22.5 MG: KIT SUBCUTANEOUS at 08:09

## 2024-09-17 NOTE — NURSING
Patient tolerated eligard injection to abdomen well today. NAD noted upon discharge. AVS and calendar given to patient. Discharged home, ambulated independently.

## 2024-09-18 ENCOUNTER — PATIENT MESSAGE (OUTPATIENT)
Dept: ADMINISTRATIVE | Facility: HOSPITAL | Age: 64
End: 2024-09-18
Payer: MEDICARE

## 2024-11-11 ENCOUNTER — IMMUNIZATION (OUTPATIENT)
Dept: INTERNAL MEDICINE | Facility: CLINIC | Age: 64
End: 2024-11-11
Payer: MEDICARE

## 2024-11-11 ENCOUNTER — LAB VISIT (OUTPATIENT)
Dept: LAB | Facility: HOSPITAL | Age: 64
End: 2024-11-11
Attending: INTERNAL MEDICINE
Payer: MEDICARE

## 2024-11-11 ENCOUNTER — OFFICE VISIT (OUTPATIENT)
Dept: INTERNAL MEDICINE | Facility: CLINIC | Age: 64
End: 2024-11-11
Payer: MEDICARE

## 2024-11-11 VITALS
SYSTOLIC BLOOD PRESSURE: 128 MMHG | WEIGHT: 193.81 LBS | BODY MASS INDEX: 27.13 KG/M2 | OXYGEN SATURATION: 98 % | HEART RATE: 69 BPM | DIASTOLIC BLOOD PRESSURE: 72 MMHG | HEIGHT: 71 IN

## 2024-11-11 DIAGNOSIS — E11.9 TYPE 2 DIABETES MELLITUS WITHOUT COMPLICATION: ICD-10-CM

## 2024-11-11 DIAGNOSIS — Z23 NEED FOR SHINGLES VACCINE: ICD-10-CM

## 2024-11-11 DIAGNOSIS — E55.9 VITAMIN D DEFICIENCY: ICD-10-CM

## 2024-11-11 DIAGNOSIS — E11.59 HYPERTENSION ASSOCIATED WITH TYPE 2 DIABETES MELLITUS: ICD-10-CM

## 2024-11-11 DIAGNOSIS — I15.2 HYPERTENSION ASSOCIATED WITH TYPE 2 DIABETES MELLITUS: ICD-10-CM

## 2024-11-11 DIAGNOSIS — E89.0 POSTABLATIVE HYPOTHYROIDISM: ICD-10-CM

## 2024-11-11 DIAGNOSIS — E78.5 DYSLIPIDEMIA: ICD-10-CM

## 2024-11-11 DIAGNOSIS — R35.0 BENIGN PROSTATIC HYPERPLASIA WITH URINARY FREQUENCY: ICD-10-CM

## 2024-11-11 DIAGNOSIS — C61 PROSTATE CANCER: ICD-10-CM

## 2024-11-11 DIAGNOSIS — N20.0 KIDNEY STONE: ICD-10-CM

## 2024-11-11 DIAGNOSIS — E11.9 TYPE 2 DIABETES MELLITUS WITHOUT COMPLICATION, WITHOUT LONG-TERM CURRENT USE OF INSULIN: Primary | ICD-10-CM

## 2024-11-11 DIAGNOSIS — Z12.11 SCREENING FOR MALIGNANT NEOPLASM OF COLON: ICD-10-CM

## 2024-11-11 DIAGNOSIS — Z23 NEED FOR VACCINATION: Primary | ICD-10-CM

## 2024-11-11 DIAGNOSIS — N40.1 BENIGN PROSTATIC HYPERPLASIA WITH URINARY FREQUENCY: ICD-10-CM

## 2024-11-11 DIAGNOSIS — E11.9 TYPE 2 DIABETES MELLITUS WITHOUT COMPLICATION, WITHOUT LONG-TERM CURRENT USE OF INSULIN: ICD-10-CM

## 2024-11-11 LAB
25(OH)D3+25(OH)D2 SERPL-MCNC: 25 NG/ML (ref 30–96)
ALBUMIN SERPL BCP-MCNC: 4.1 G/DL (ref 3.5–5.2)
ALBUMIN/CREAT UR: NORMAL UG/MG (ref 0–30)
ALP SERPL-CCNC: 77 U/L (ref 40–150)
ALT SERPL W/O P-5'-P-CCNC: 22 U/L (ref 10–44)
ANION GAP SERPL CALC-SCNC: 9 MMOL/L (ref 8–16)
AST SERPL-CCNC: 21 U/L (ref 10–40)
BASOPHILS # BLD AUTO: 0.07 K/UL (ref 0–0.2)
BASOPHILS NFR BLD: 0.8 % (ref 0–1.9)
BILIRUB SERPL-MCNC: 0.5 MG/DL (ref 0.1–1)
BUN SERPL-MCNC: 17 MG/DL (ref 8–23)
CALCIUM SERPL-MCNC: 10.3 MG/DL (ref 8.7–10.5)
CHLORIDE SERPL-SCNC: 105 MMOL/L (ref 95–110)
CHOLEST SERPL-MCNC: 145 MG/DL (ref 120–199)
CHOLEST/HDLC SERPL: 2.9 {RATIO} (ref 2–5)
CO2 SERPL-SCNC: 24 MMOL/L (ref 23–29)
COMPLEXED PSA SERPL-MCNC: 0.03 NG/ML (ref 0–4)
CREAT SERPL-MCNC: 1.3 MG/DL (ref 0.5–1.4)
CREAT UR-MCNC: 24 MG/DL (ref 23–375)
DIFFERENTIAL METHOD BLD: ABNORMAL
EOSINOPHIL # BLD AUTO: 0.4 K/UL (ref 0–0.5)
EOSINOPHIL NFR BLD: 4.4 % (ref 0–8)
ERYTHROCYTE [DISTWIDTH] IN BLOOD BY AUTOMATED COUNT: 13 % (ref 11.5–14.5)
EST. GFR  (NO RACE VARIABLE): >60 ML/MIN/1.73 M^2
ESTIMATED AVG GLUCOSE: 169 MG/DL (ref 68–131)
GLUCOSE SERPL-MCNC: 121 MG/DL (ref 70–110)
HBA1C MFR BLD: 7.5 % (ref 4–5.6)
HCT VFR BLD AUTO: 40.1 % (ref 40–54)
HDLC SERPL-MCNC: 50 MG/DL (ref 40–75)
HDLC SERPL: 34.5 % (ref 20–50)
HGB BLD-MCNC: 13.4 G/DL (ref 14–18)
IMM GRANULOCYTES # BLD AUTO: 0.05 K/UL (ref 0–0.04)
IMM GRANULOCYTES NFR BLD AUTO: 0.6 % (ref 0–0.5)
LDLC SERPL CALC-MCNC: 73.6 MG/DL (ref 63–159)
LYMPHOCYTES # BLD AUTO: 2.3 K/UL (ref 1–4.8)
LYMPHOCYTES NFR BLD: 26.2 % (ref 18–48)
MCH RBC QN AUTO: 31.3 PG (ref 27–31)
MCHC RBC AUTO-ENTMCNC: 33.4 G/DL (ref 32–36)
MCV RBC AUTO: 94 FL (ref 82–98)
MICROALBUMIN UR DL<=1MG/L-MCNC: <5 UG/ML
MONOCYTES # BLD AUTO: 0.7 K/UL (ref 0.3–1)
MONOCYTES NFR BLD: 7.7 % (ref 4–15)
NEUTROPHILS # BLD AUTO: 5.2 K/UL (ref 1.8–7.7)
NEUTROPHILS NFR BLD: 60.3 % (ref 38–73)
NONHDLC SERPL-MCNC: 95 MG/DL
NRBC BLD-RTO: 0 /100 WBC
PLATELET # BLD AUTO: 268 K/UL (ref 150–450)
PMV BLD AUTO: 9.8 FL (ref 9.2–12.9)
POTASSIUM SERPL-SCNC: 4.4 MMOL/L (ref 3.5–5.1)
PROT SERPL-MCNC: 8.2 G/DL (ref 6–8.4)
RBC # BLD AUTO: 4.28 M/UL (ref 4.6–6.2)
SODIUM SERPL-SCNC: 138 MMOL/L (ref 136–145)
TESTOST SERPL-MCNC: 10 NG/DL (ref 304–1227)
TRIGL SERPL-MCNC: 107 MG/DL (ref 30–150)
TSH SERPL DL<=0.005 MIU/L-ACNC: 1.45 UIU/ML (ref 0.4–4)
WBC # BLD AUTO: 8.66 K/UL (ref 3.9–12.7)

## 2024-11-11 PROCEDURE — 3074F SYST BP LT 130 MM HG: CPT | Mod: CPTII,S$GLB,, | Performed by: INTERNAL MEDICINE

## 2024-11-11 PROCEDURE — 1157F ADVNC CARE PLAN IN RCRD: CPT | Mod: CPTII,S$GLB,, | Performed by: INTERNAL MEDICINE

## 2024-11-11 PROCEDURE — 36415 COLL VENOUS BLD VENIPUNCTURE: CPT | Performed by: INTERNAL MEDICINE

## 2024-11-11 PROCEDURE — 80061 LIPID PANEL: CPT | Performed by: INTERNAL MEDICINE

## 2024-11-11 PROCEDURE — 84153 ASSAY OF PSA TOTAL: CPT | Performed by: INTERNAL MEDICINE

## 2024-11-11 PROCEDURE — 84443 ASSAY THYROID STIM HORMONE: CPT | Performed by: INTERNAL MEDICINE

## 2024-11-11 PROCEDURE — 3288F FALL RISK ASSESSMENT DOCD: CPT | Mod: CPTII,S$GLB,, | Performed by: INTERNAL MEDICINE

## 2024-11-11 PROCEDURE — 85025 COMPLETE CBC W/AUTO DIFF WBC: CPT | Performed by: INTERNAL MEDICINE

## 2024-11-11 PROCEDURE — 99999 PR PBB SHADOW E&M-EST. PATIENT-LVL III: CPT | Mod: PBBFAC,,, | Performed by: INTERNAL MEDICINE

## 2024-11-11 PROCEDURE — 82043 UR ALBUMIN QUANTITATIVE: CPT | Performed by: INTERNAL MEDICINE

## 2024-11-11 PROCEDURE — 3078F DIAST BP <80 MM HG: CPT | Mod: CPTII,S$GLB,, | Performed by: INTERNAL MEDICINE

## 2024-11-11 PROCEDURE — 84403 ASSAY OF TOTAL TESTOSTERONE: CPT | Performed by: INTERNAL MEDICINE

## 2024-11-11 PROCEDURE — 99214 OFFICE O/P EST MOD 30 MIN: CPT | Mod: S$GLB,,, | Performed by: INTERNAL MEDICINE

## 2024-11-11 PROCEDURE — 1101F PT FALLS ASSESS-DOCD LE1/YR: CPT | Mod: CPTII,S$GLB,, | Performed by: INTERNAL MEDICINE

## 2024-11-11 PROCEDURE — 3008F BODY MASS INDEX DOCD: CPT | Mod: CPTII,S$GLB,, | Performed by: INTERNAL MEDICINE

## 2024-11-11 PROCEDURE — 80053 COMPREHEN METABOLIC PANEL: CPT | Performed by: INTERNAL MEDICINE

## 2024-11-11 PROCEDURE — 4010F ACE/ARB THERAPY RXD/TAKEN: CPT | Mod: CPTII,S$GLB,, | Performed by: INTERNAL MEDICINE

## 2024-11-11 PROCEDURE — 82306 VITAMIN D 25 HYDROXY: CPT | Performed by: INTERNAL MEDICINE

## 2024-11-11 PROCEDURE — 1126F AMNT PAIN NOTED NONE PRSNT: CPT | Mod: CPTII,S$GLB,, | Performed by: INTERNAL MEDICINE

## 2024-11-11 PROCEDURE — 83036 HEMOGLOBIN GLYCOSYLATED A1C: CPT | Performed by: INTERNAL MEDICINE

## 2024-11-11 RX ORDER — LEVOTHYROXINE SODIUM 137 UG/1
150 TABLET ORAL DAILY
Qty: 90 TABLET | Refills: 3 | Status: SHIPPED | OUTPATIENT
Start: 2024-11-11

## 2024-11-11 RX ORDER — METFORMIN HYDROCHLORIDE 500 MG/1
500 TABLET ORAL 2 TIMES DAILY
Qty: 180 TABLET | Refills: 3 | Status: SHIPPED | OUTPATIENT
Start: 2024-11-11

## 2024-11-11 RX ORDER — ATORVASTATIN CALCIUM 20 MG/1
20 TABLET, FILM COATED ORAL NIGHTLY
Qty: 90 TABLET | Refills: 3 | Status: SHIPPED | OUTPATIENT
Start: 2024-11-11

## 2024-11-11 RX ORDER — GLIMEPIRIDE 2 MG/1
2 TABLET ORAL
Qty: 90 TABLET | Refills: 3 | Status: SHIPPED | OUTPATIENT
Start: 2024-11-11 | End: 2024-11-11 | Stop reason: SDUPTHER

## 2024-11-11 RX ORDER — GLIMEPIRIDE 2 MG/1
2 TABLET ORAL
Qty: 90 TABLET | Refills: 3 | Status: SHIPPED | OUTPATIENT
Start: 2024-11-11 | End: 2024-11-12

## 2024-11-11 RX ORDER — LOSARTAN POTASSIUM 25 MG/1
25 TABLET ORAL NIGHTLY
Qty: 90 TABLET | Refills: 3 | Status: SHIPPED | OUTPATIENT
Start: 2024-11-11

## 2024-11-11 RX ORDER — ZOSTER VACCINE RECOMBINANT, ADJUVANTED 50 MCG/0.5
0.5 KIT INTRAMUSCULAR ONCE
Qty: 1 EACH | Refills: 0 | Status: SHIPPED | OUTPATIENT
Start: 2024-11-11 | End: 2024-11-12

## 2024-11-11 NOTE — PROGRESS NOTES
INTERNAL MEDICINE CLINIC  Follow-up Visit Progress Note     PRESENTING HISTORY     PCP: Pawan Garza MD    Last Clinic Visit with me:  3-4-24    Current Chief Complaint/Problem:    Chief Complaint   Patient presents with    Annual Exam      History of Present Illness & ROS: Mr. Waldemar Marquez is a 63 y.o. male.    Review of Systems:  Answers submitted by the patient for this visit:  Review of Systems Questionnaire (Submitted on 11/4/2024)  activity change: No  unexpected weight change: No  neck pain: No  hearing loss: No  rhinorrhea: No  trouble swallowing: No  eye discharge: No  visual disturbance: No  chest tightness: No  wheezing: No  chest pain: No  palpitations: No  blood in stool: No  constipation: No  vomiting: No  diarrhea: No  polydipsia: No  polyuria: No  difficulty urinating: No  urgency: No  hematuria: No  joint swelling: No  arthralgias: No  headaches: No  weakness: No  confusion: No  dysphoric mood: No      PAST HISTORY:     Past Medical History:   Diagnosis Date    Dyslipidemia 02/25/2013    Hypertension associated with type 2 diabetes mellitus 12/19/2023    Kidney stone 12/19/2023    Postablative hypothyroidism 02/25/2013    radioactive ablation    Prostate cancer 01/16/2024    Type 2 diabetes mellitus without complication, without long-term current use of insulin 03/14/2011    Vitamin D deficiency 02/25/2013       Past Surgical History:   Procedure Laterality Date    CYSTOSCOPY WITH URETEROSCOPY, RETROGRADE PYELOGRAPHY, AND INSERTION OF STENT Left 1/24/2024    Procedure: CYSTOSCOPY, WITH RETROGRADE PYELOGRAM AND URETERAL STENT INSERTION;  Surgeon: Reinaldo Ervin MD;  Location: [facility] OR;  Service: Urology;  Laterality: Left;    CYSTOSCOPY WITH URETEROSCOPY, RETROGRADE PYELOGRAPHY, AND INSERTION OF STENT Left 2/21/2024    Procedure: CYSTOSCOPY, WITH RETROGRADE PYELOGRAM AND URETERAL STENT INSERTION;  Surgeon: Reinaldo Ervin MD;  Location: [facility] OR;  Service: Urology;  Laterality: Left;     CYSTOURETEROSCOPY, WITH HOLMIUM LASER LITHOTRIPSY OF URETERAL CALCULUS AND STENT INSERTION Left 2/21/2024    Procedure: CYSTOURETEROSCOPY, WITH HOLMIUM LASER LITHOTRIPSY OF URETERAL CALCULUS AND STENT INSERTION;  Surgeon: Reinaldo Ervin MD;  Location: [facility] OR;  Service: Urology;  Laterality: Left;  Please have semirigid ureteroscope, digital flexible ureteroscope, ellis laser, and tech (Maurice) if available.    EXTRACTION - STONE Left 2/21/2024    Procedure: EXTRACTION - STONE;  Surgeon: Reinaldo Ervin MD;  Location: [facility] OR;  Service: Urology;  Laterality: Left;    TRANSURETHRAL RESECTION OF PROSTATE N/A 1/24/2024    Procedure: TURP (TRANSURETHRAL RESECTION OF PROSTATE);  Surgeon: Reinaldo Ervin MD;  Location: [facility] OR;  Service: Urology;  Laterality: N/A;  Bipolar TURBT, 1 hr       Family History   Problem Relation Name Age of Onset    Breast cancer Sister Ms. Marquez        Social History     Socioeconomic History    Marital status:    Tobacco Use    Smoking status: Every Day     Current packs/day: 0.25     Average packs/day: 0.3 packs/day for 55.0 years (17.5 ttl pk-yrs)     Types: Cigarettes    Smokeless tobacco: Never   Substance and Sexual Activity    Alcohol use: Never    Drug use: Never    Sexual activity: Not Currently     Partners: Female   Social History Narrative    Works at restaurant        : John        1 offspring      Social Drivers of Health     Financial Resource Strain: Low Risk  (12/20/2023)    Overall Financial Resource Strain (CARDIA)     Difficulty of Paying Living Expenses: Not hard at all   Food Insecurity: No Food Insecurity (12/20/2023)    Hunger Vital Sign     Worried About Running Out of Food in the Last Year: Never true     Ran Out of Food in the Last Year: Never true   Transportation Needs: No Transportation Needs (12/20/2023)    PRAPARE - Transportation     Lack of Transportation (Medical): No     Lack of Transportation (Non-Medical): No   Physical Activity:  Sufficiently Active (12/20/2023)    Exercise Vital Sign     Days of Exercise per Week: 7 days     Minutes of Exercise per Session: 40 min   Stress: Stress Concern Present (12/20/2023)    [employer] - Occupational Stress Questionnaire     Feeling of Stress : To some extent   Housing Stability: Low Risk  (12/20/2023)    Housing Stability Vital Sign     Unable to Pay for Housing in the Last Year: No     Number of Places Lived in the Last Year: 1     Unstable Housing in the Last Year: No       MEDICATIONS & ALLERGIES:     Current Outpatient Medications on File Prior to Visit   Medication Sig Dispense Refill    abiraterone (ZYTIGA) 250 mg Tab Take 1 tablet (250 mg total) by mouth once daily. 30 tablet 11    bicalutamide (CASODEX) 50 MG Tab Take 1 tablet (50 mg total) by mouth once daily. 90 tablet 3    cholecalciferol, vitamin D3, 125 mcg (5,000 unit) Tab Take 1 tablet (5,000 Units total) by mouth once daily.      finasteride (PROSCAR) 5 mg tablet Take 1 tablet (5 mg total) by mouth once daily. 90 tablet 3    glimepiride (AMARYL) 2 MG tablet Take 1 tablet (2 mg total) by mouth before breakfast. 90 tablet 3    tamsulosin (FLOMAX) 0.4 mg Cap Take 1 capsule (0.4 mg total) by mouth once daily. 90 capsule 3    atorvastatin (LIPITOR) 20 MG tablet Take 1 tablet (20 mg total) by mouth nightly. 90 tablet 3    levothyroxine (SYNTHROID) 137 MCG Tab tablet Take 1 tablet (137 mcg total) by mouth once daily. 90 tablet 3    losartan (COZAAR) 25 MG tablet Take 1 tablet (25 mg total) by mouth nightly. 90 tablet 3    metFORMIN (GLUCOPHAGE) 500 MG tablet Take 1 tablet (500 mg total) by mouth 2 (two) times daily. 180 tablet 3     Review of patient's allergies indicates:  No Known Allergies    Medications Reconciliation:   I have reconciled the patient's home medications and discharge medications with the patient/family. I have updated all changes.  Refer to After-Visit Medication List.    OBJECTIVE:     Vital  Signs:  Vitals:    11/11/24 1617   BP: 128/72   Pulse: 69     Wt Readings from Last 3 Encounters:   11/11/24 1617 87.9 kg (193 lb 12.6 oz)   09/17/24 0810 88.3 kg (194 lb 10.7 oz)   09/16/24 1040 88.1 kg (194 lb 3.6 oz)     Body mass index is 27.03 kg/m².     Physical Exam:  General: Well developed, well nourished. No distress.  HEENT: Head is normocephalic, atraumatic;  Eyes: Clear conjunctiva.  Neck: Supple, symmetrical neck; trachea midline.  Lungs: Clear to auscultation bilaterally and normal respiratory effort.  Cardiovascular: Heart with regular rate and rhythm.    Extremities: No LE edema.   Abdomen: Abdomen is soft, non-tender non-distended with normal bowel sounds.  Skin: Skin color, texture, turgor normal. No rashes.  Musculoskeletal: Normal gait.   Psychiatric: Normal affect. Alert.    Laboratory  Lab Results   Component Value Date    WBC 8.66 11/11/2024    WBC 8.66 11/11/2024    WBC 8.66 11/11/2024    HGB 13.4 (L) 11/11/2024    HGB 13.4 (L) 11/11/2024    HGB 13.4 (L) 11/11/2024    HCT 40.1 11/11/2024    HCT 40.1 11/11/2024    HCT 40.1 11/11/2024     11/11/2024     11/11/2024     11/11/2024    CHOL 145 11/11/2024    TRIG 107 11/11/2024    HDL 50 11/11/2024    ALT 22 11/11/2024    ALT 22 11/11/2024    ALT 22 11/11/2024    AST 21 11/11/2024    AST 21 11/11/2024    AST 21 11/11/2024     11/11/2024     11/11/2024     11/11/2024     11/11/2024    K 4.4 11/11/2024    K 4.4 11/11/2024    K 4.4 11/11/2024    K 4.4 11/11/2024     11/11/2024     11/11/2024     11/11/2024     11/11/2024    CREATININE 1.3 11/11/2024    CREATININE 1.3 11/11/2024    CREATININE 1.3 11/11/2024    CREATININE 1.3 11/11/2024    BUN 17 11/11/2024    BUN 17 11/11/2024    BUN 17 11/11/2024    BUN 17 11/11/2024    CO2 24 11/11/2024    CO2 24 11/11/2024    CO2 24 11/11/2024    CO2 24 11/11/2024    TSH 1.453 11/11/2024    PSA 1.2 12/16/2014    HGBA1C 7.5 (H) 11/11/2024  "      ASSESSMENT & PLAN:     Type 2 diabetes mellitus without complication, without long-term current use of insulin  A1c 6.9 --> 7.5  On:  -     metFORMIN (GLUCOPHAGE) 500 MG tablet; Take 1 tablet (500 mg total) by mouth 2 (two) times daily.      Increase:  -     glimepiride (AMARYL) 2 MG BID.     Dyslipidemia      -     atorvastatin (LIPITOR) 20 MG tablet; Take 1 tablet (20 mg total) by mouth nightly.       Hypertension associated with type 2 diabetes mellitus     -     losartan (COZAAR) 25 MG tablet; Take 1 tablet (25 mg total) by mouth nightly.       Postablative hypothyroidism     -     levothyroxine (SYNTHROID) 137 MCG Tab tablet; Take 1 tablet (137 mcg total) by mouth once daily.       Kidney stone  2-  - s/p Cystoureteroscopy with laser lithotripsy of ureteral stoen and stent insertion      2-29-24  - patient had residual stone adjacent to the stent and within the kidney on his imaging study.  I discussed options and recommended a second-look ureteroscopy.  Patient stated that he would prefer not to have another surgery at this time and would like his stent removed to give his stones chance to pass.  The stent was removed today and we will repeat imaging in 6 weeks.      Vitamin D deficiency     Change to Vitamin D 50,000 IU weekly.     Benign prostatic hyperplasia with urinary frequency  -     tamsulosin (FLOMAX) 0.4 mg Cap; Take 1 capsule (0.4 mg total) by mouth once daily.  -     finasteride (PROSCAR) 5 mg tablet; Take 1 tablet (5 mg total) by mouth once daily.       Prostate Cancer Stage IVB  Oncology 9-2024  - MRI prostate (12/11/23) revealed "  Diffusely abnormal appearance of the prostate highly concerning for malignancy (PI-RADS 5).  Suspected involvement of the bilateral seminal vesicles" as well as a suspicious right pelvic lymph node.  - PSA (12/27/23) was elevated at 13 ng/mL, 26 when corrected for finasteride  - biopsy of prostate (12/27/23) revealed Ursula 3+4=7 (Grade 2) prostate " "cancer in all biopsy sites.  - PSMA scan (1/4/24) revealed "biopsy-proven prostate malignancy with bladder invasion.  Tracer-avid lesions concerning for supraclavicular, thoracic, abdominopelvic, and osseous metastases."  - he began bicalutamide in January 2024.  - he underwent TURBT/TURP on 1/24/24.  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  - during trip to [place], he took bicalutamide, which is cheaper. He has not gone back on enzalutamide since the trip. He is currently taking bicalutamide. He would like to continue bicalutamide for now.  - Labs have been reviewed. Hemoglobin is 13.1 g/dL. PSA is 0.03 ng/mL  - okay to proceed with leuprolide today.  - recommend dental clearance in order to proceed with denosumab q12 weeks.    - return to clinic in 3 months in preparation for next dose of Eligard.    Screening for malignant neoplasm of colon  -     Ambulatory referral/consult to Endo Procedure ; Future; Expected date: 11/12/2024    Preventive Health Maintenance:     Flu vaccine  Need for shingles vaccine  -     varicella-zoster [id number], PF, (SHINGRIX, PF,) 50 mcg/0.5 mL injection; Inject 0.5 mLs into the muscle once. for 1 dose  Dispense: 1 each; Refill: 0    -     Hemoglobin A1C; Future; Expected date: 11/11/2024  -     Microalbumin/Creatinine Ratio, Urine; Future; Expected date: 11/11/2024  -     Basic Metabolic Panel; Future; Expected date: 11/11/2024  -     TSH; Future; Expected date: 11/11/2024  -     Vitamin D; Future; Expected date: 05/10/2025    Return to Clinic for Follow Up with me:  May 2025 for annual     Scheduled Follow-up :  Future Appointments   Date Time Provider Department Center   12/17/2024  8:00 AM Yasir Chi MD [facility] HEM ONC [facility]   12/17/2024  9:00 AM CHAIR 01 [facility] CHEMO [facility]   2/7/2025  9:00 AM PRE-ADMIT NURSE 1, ENDO -[facility] [address] "   4/21/2025 10:15 AM [facility] OIC-CT2 500 LB LIMIT [facility]   4/21/2025 11:30 AM [facility] PET CT LIMIT 500 LBS [facility] PET CT [facility]   4/24/2025  8:15 AM Reinaldo Ervin MD [facility] UROLOGY [facility]   5/19/2025 11:00 AM Pawan Garza MD [facility] IM [facility]       After Visit Medication List :     Medication List            Accurate as of November 11, 2024 11:59 PM. If you have any questions, ask your nurse or doctor.                START taking these medications      cholecalciferol (vitamin D3) 1,250 mcg (50,000 unit) capsule  Take 1 capsule (50,000 Units total) by mouth once a week.  Replaces: cholecalciferol (vitamin D3) 125 mcg (5,000 unit) Tab  Started by: Pawan Garza MD     SHINGRIX (PF) 50 mcg/0.5 mL injection  Generic drug: varicella-zoster [id number] (PF)  Inject 0.5 mLs into the muscle once. for 1 dose  Started by: Pawan Garza MD            CHANGE how you take these medications      glimepiride 2 MG tablet  Commonly known as: AMARYL  Take 1 tablet (2 mg total) by mouth 2 (two) times daily with meals.  What changed: when to take this  Changed by: Pawan Garza MD            CONTINUE taking these medications      abiraterone 250 mg Tab  Commonly known as: ZYTIGA  Take 1 tablet (250 mg total) by mouth once daily.     atorvastatin 20 MG tablet  Commonly known as: LIPITOR  Take 1 tablet (20 mg total) by mouth nightly.     bicalutamide 50 MG Tab  Commonly known as: CASODEX  Take 1 tablet (50 mg total) by mouth once daily.     finasteride 5 mg tablet  Commonly known as: PROSCAR  Take 1 tablet (5 mg total) by mouth once daily.     levothyroxine 137 MCG Tab tablet  Commonly known as: SYNTHROID  Take 1 tablet (137 mcg total) by mouth once daily.     losartan 25 MG tablet  Commonly known as: COZAAR  Take 1 tablet (25 mg total) by mouth nightly.     metFORMIN 500 MG tablet  Commonly known as: GLUCOPHAGE  Take 1 tablet (500 mg total) by mouth 2 (two) times daily.     tamsulosin 0.4 mg Cap  Commonly known  as: FLOMAX  Take 1 capsule (0.4 mg total) by mouth once daily.            STOP taking these medications      cholecalciferol (vitamin D3) 125 mcg (5,000 unit) Tab  Replaced by: cholecalciferol (vitamin D3) 1,250 mcg (50,000 unit) capsule  Stopped by: Pawan Garza MD     predniSONE 5 MG tablet  Commonly known as: DELTASONE  Stopped by: Pawan Garza MD               Where to Get Your Medications        These medications were sent to [facility]  [address] [place] [id number]      Hours: Mon-Fri, 8a-5:30p Phone: [phone]   SHINGRIX (PF) 50 mcg/0.5 mL injection       These medications were sent to [facility] [address]  [address] [facility]  [address] [place], [place] [address] [address]      Phone: [phone]   atorvastatin 20 MG tablet  cholecalciferol (vitamin D3) 1,250 mcg (50,000 unit) capsule  glimepiride 2 MG tablet  levothyroxine 137 MCG Tab tablet  losartan 25 MG tablet  metFORMIN 500 MG tablet         Signing Physician:  Pawan Garza MD

## 2024-11-12 RX ORDER — GLIMEPIRIDE 2 MG/1
2 TABLET ORAL 2 TIMES DAILY WITH MEALS
Qty: 180 TABLET | Refills: 3 | Status: SHIPPED | OUTPATIENT
Start: 2024-11-12

## 2024-11-12 RX ORDER — ASPIRIN 325 MG
50000 TABLET, DELAYED RELEASE (ENTERIC COATED) ORAL WEEKLY
Qty: 12 CAPSULE | Refills: 3 | Status: SHIPPED | OUTPATIENT
Start: 2024-11-12

## 2024-11-12 RX ORDER — ASPIRIN 325 MG
50000 TABLET, DELAYED RELEASE (ENTERIC COATED) ORAL WEEKLY
Qty: 12 CAPSULE | Refills: 3 | Status: SHIPPED | OUTPATIENT
Start: 2024-11-12 | End: 2024-11-12 | Stop reason: SDUPTHER

## 2024-11-12 RX ORDER — GLIMEPIRIDE 2 MG/1
2 TABLET ORAL 2 TIMES DAILY WITH MEALS
Qty: 180 TABLET | Refills: 3 | Status: SHIPPED | OUTPATIENT
Start: 2024-11-12 | End: 2024-11-12

## 2024-12-10 DIAGNOSIS — E11.9 TYPE 2 DIABETES MELLITUS WITHOUT COMPLICATION, WITHOUT LONG-TERM CURRENT USE OF INSULIN: Primary | ICD-10-CM

## 2024-12-10 RX ORDER — GLIMEPIRIDE 2 MG/1
2 TABLET ORAL 2 TIMES DAILY WITH MEALS
Qty: 180 TABLET | Refills: 3 | Status: SHIPPED | OUTPATIENT
Start: 2024-12-10

## 2024-12-13 NOTE — PROGRESS NOTES
"PATIENT: Waldemar Marquez  MRN: [id number]  DATE: 12/17/2024    Diagnosis:   1. Prostate cancer    2. 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 Secondary malignant neoplasm of bone    4. Anemia in neoplastic disease    5. Hypertension associated with type 2 diabetes mellitus      Chief Complaint: Prostate Cancer    Oncologic History:      Oncologic History Prostate cancer      Oncologic Treatment Bicalutamide  Eligard      Pathology 1/24/24:  PROSTATE CHIPS, TRANSURETHRAL RESECTION:   - Prostatic adenocarcinoma, Ursula score 4+3=7 (Grade group 3) with tertiary pattern 5 involving 95% of the totally submitted specimen.   - Percentage of pattern 4 = 60%.   - Percentage of pattern 5 = <5%.   - Cribriform glands are present.   - Perineural invasion is present.   - Intraductal carcinoma is present and included in the percentage of pattern 4.     12/27/23:  BIOPSIES OF 1. RIGHT BASE, 2. RIGHT MID, 3. RIGHT APEX, 4. LEFT APEX, 5. LEFT MID, 6. LEFT BASE OF THE PROSTATE:   ADENOCARCINOMA TOTAL URSULA SCORE 7 (3+4)--GRADE GROUP 2       Subjective:    History of Present Illness: Mr. Marquez is a 64 y.o. male who presented in January 2024 for evaluation and management of prostate cancer. He is referred by Dr. Ervin.    - presenting symptoms were nocturia, lower urinary tract symptoms  - MRI prostate (12/11/23) revealed "  Diffusely abnormal appearance of the prostate highly concerning for malignancy (PI-RADS 5).  Suspected involvement of the bilateral seminal vesicles" as well as a suspicious right pelvic lymph node.  - PSA (12/27/23) was elevated at 13 ng/mL, 26 when corrected for finasteride  - biopsy of prostate (12/27/23) revealed Ursula 3+4=7 (Grade 2) prostate cancer in all biopsy sites.  - PSMA scan (1/4/24) revealed "biopsy-proven prostate malignancy with bladder invasion.  Tracer-avid lesions concerning for supraclavicular, thoracic, abdominopelvic, and osseous metastases."  - per Dr. Ervin's " "note dated 1/10/24:  "Needs cystoscopy and TURBT and TURP to de-obstruct him, also need ureteroscopy and laser lithotripsy and stent placement."     - he began bicalutamide in January 2024.    - during trip to [place] in 2024, he took bicalutamide, which is cheaper. He has not gone back on enzalutamide since the trip. He is currently taking bicalutamide. He would like to continue bicalutamide for now.    Interval history:  - he presents for a follow-up appointment for his prostate cancer.  - he is going to [place].  - today, he is doing well. He is scheduled for [facility] today.. he endorses fatigue. He denies pain, weight loss, shortness of breath, chest pain, nausea, vomiting, diarrhea, constipation.       Past medical, surgical, family, and social histories have been reviewed and updated below.    Past Medical History:   Past Medical History:   Diagnosis Date    Dyslipidemia 02/25/2013    Hypertension associated with type 2 diabetes mellitus 12/19/2023    Kidney stone 12/19/2023    Postablative hypothyroidism 02/25/2013    radioactive ablation    Prostate cancer 01/16/2024    Type 2 diabetes mellitus without complication, without long-term current use of insulin 03/14/2011    Vitamin D deficiency 02/25/2013       Past Surgical History:   Past Surgical History:   Procedure Laterality Date    CYSTOSCOPY WITH URETEROSCOPY, RETROGRADE PYELOGRAPHY, AND INSERTION OF STENT Left 1/24/2024    Procedure: CYSTOSCOPY, WITH RETROGRADE PYELOGRAM AND URETERAL STENT INSERTION;  Surgeon: Reinaldo Ervin MD;  Location: [facility] OR;  Service: Urology;  Laterality: Left;    CYSTOSCOPY WITH URETEROSCOPY, RETROGRADE PYELOGRAPHY, AND INSERTION OF STENT Left 2/21/2024    Procedure: CYSTOSCOPY, WITH RETROGRADE PYELOGRAM AND URETERAL STENT INSERTION;  Surgeon: Reinaldo Ervin MD;  Location: [facility] OR;  Service: Urology;  Laterality: Left;    CYSTOURETEROSCOPY, WITH HOLMIUM LASER LITHOTRIPSY OF URETERAL CALCULUS AND " STENT INSERTION Left 2/21/2024    Procedure: CYSTOURETEROSCOPY, WITH HOLMIUM LASER LITHOTRIPSY OF URETERAL CALCULUS AND STENT INSERTION;  Surgeon: Reinaldo Ervin MD;  Location: [facility] OR;  Service: Urology;  Laterality: Left;  Please have semirigid ureteroscope, digital flexible ureteroscope, ellis laser, and tech (Maurice) if available.    EXTRACTION - STONE Left 2/21/2024    Procedure: EXTRACTION - STONE;  Surgeon: Reinaldo Ervin MD;  Location: [facility] OR;  Service: Urology;  Laterality: Left;    TRANSURETHRAL RESECTION OF PROSTATE N/A 1/24/2024    Procedure: TURP (TRANSURETHRAL RESECTION OF PROSTATE);  Surgeon: Reinaldo Ervin MD;  Location: [facility] OR;  Service: Urology;  Laterality: N/A;  Bipolar TURBT, 1 hr       Family History:   Family History   Problem Relation Name Age of Onset    Breast cancer Sister Ms. Marquez        Social History:  reports that he has been smoking cigarettes. He has a 17.5 pack-year smoking history. He has never used smokeless tobacco. He reports that he does not drink alcohol and does not use drugs.    Allergies:  Review of patient's allergies indicates:  No Known Allergies    Medications:  Current Outpatient Medications   Medication Sig Dispense Refill    abiraterone (ZYTIGA) 250 mg Tab Take 1 tablet (250 mg total) by mouth once daily. 30 tablet 11    atorvastatin (LIPITOR) 20 MG tablet Take 1 tablet (20 mg total) by mouth nightly. 90 tablet 3    bicalutamide (CASODEX) 50 MG Tab Take 1 tablet (50 mg total) by mouth once daily. 90 tablet 3    cholecalciferol, vitamin D3, 1,250 mcg (50,000 unit) capsule Take 1 capsule (50,000 Units total) by mouth once a week. 12 capsule 3    finasteride (PROSCAR) 5 mg tablet Take 1 tablet (5 mg total) by mouth once daily. 90 tablet 3    glimepiride (AMARYL) 2 MG tablet Take 1 tablet (2 mg total) by mouth 2 (two) times daily with meals. 180 tablet 3    levothyroxine (SYNTHROID) 137 MCG Tab tablet Take 1 tablet (137 mcg total) by mouth once daily. 90 tablet 3     "losartan (COZAAR) 25 MG tablet Take 1 tablet (25 mg total) by mouth nightly. 90 tablet 3    metFORMIN (GLUCOPHAGE) 500 MG tablet Take 1 tablet (500 mg total) by mouth 2 (two) times daily. 180 tablet 3    tamsulosin (FLOMAX) 0.4 mg Cap Take 1 capsule (0.4 mg total) by mouth once daily. 90 capsule 3     No current facility-administered medications for this visit.       Review of Systems   Constitutional:  Positive for fatigue.   HENT:  Negative for sore throat.    Eyes:  Negative for visual disturbance.   Respiratory:  Negative for shortness of breath.    Cardiovascular:  Negative for chest pain.   Gastrointestinal:  Negative for abdominal pain.   Genitourinary:  Negative for dysuria.   Musculoskeletal:  Negative for back pain.   Skin:  Negative for rash.   Neurological:  Negative for headaches.   Hematological:  Negative for adenopathy.   Psychiatric/Behavioral:  The patient is not nervous/anxious.        ECOG Performance Status:   ECOG SCORE    0 - Fully active-able to carry on all pre-disease performance without restriction         Objective:      Vitals:   Vitals:    12/17/24 0759   BP: 119/69   BP Location: Right arm   Patient Position: Sitting   Pulse: 73   Temp: 97.7 °F (36.5 °C)   TempSrc: Oral   SpO2: 98%   Weight: 87.8 kg (193 lb 9 oz)   Height: 5' 11" (1.803 m)         BMI: Body mass index is 27 kg/m².    Physical Exam  Vitals and nursing note reviewed.   Constitutional:       Appearance: He is well-developed.   HENT:      Head: Normocephalic and atraumatic.   Eyes:      Pupils: Pupils are equal, round, and reactive to light.   Cardiovascular:      Rate and Rhythm: Normal rate and regular rhythm.   Pulmonary:      Effort: Pulmonary effort is normal.      Breath sounds: Normal breath sounds.   Abdominal:      General: Bowel sounds are normal.      Palpations: Abdomen is soft.   Musculoskeletal:         General: Normal range of motion.      Cervical back: Normal range of motion and neck supple.   Skin:     " "General: Skin is warm and dry.   Neurological:      Mental Status: He is alert and oriented to person, place, and time.   Psychiatric:         Behavior: Behavior normal.         Thought Content: Thought content normal.         Judgment: Judgment normal.         Laboratory Data:  Labs have been reviewed.    Lab Results   Component Value Date    WBC 8.66 11/11/2024    WBC 8.66 11/11/2024    WBC 8.66 11/11/2024    HGB 13.4 (L) 11/11/2024    HGB 13.4 (L) 11/11/2024    HGB 13.4 (L) 11/11/2024    HCT 40.1 11/11/2024    HCT 40.1 11/11/2024    HCT 40.1 11/11/2024    MCV 94 11/11/2024    MCV 94 11/11/2024    MCV 94 11/11/2024     11/11/2024     11/11/2024     11/11/2024                 Imaging:    PSMA scan (1/4/24): I have personally reviewed the images    Findings compatible with biopsy-proven prostate malignancy with bladder invasion.  Tracer-avid lesions concerning for supraclavicular, thoracic, abdominopelvic, and osseous metastases as detailed above.     Moderate left hydroureteronephrosis to the level of a 1.4 cm proximal ureteral stone.    MRI prostate (12/11/23):  Diffusely abnormal appearance of the prostate highly concerning for malignancy (PI-RADS 5).  Suspected involvement of the bilateral seminal vesicles.     Polypoid mass in the right anterolateral bladder wall concerning for involvement of the bladder wall, though primary bladder malignancy not excluded.     Suspicious right pelvic lymph node.      Assessment:       1. Prostate cancer    2. 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 Secondary malignant neoplasm of bone    4. Anemia in neoplastic disease    5. Hypertension associated with type 2 diabetes mellitus           Plan:     Prostate cancer  - I have reviewed his chart  - presenting symptoms were nocturia, lower urinary tract symptoms  - MRI prostate (12/11/23) revealed "  Diffusely abnormal appearance of the prostate highly concerning for malignancy " "(PI-RADS 5).  Suspected involvement of the bilateral seminal vesicles" as well as a suspicious right pelvic lymph node.  - PSA (12/27/23) was elevated at 13 ng/mL, 26 when corrected for finasteride  - biopsy of prostate (12/27/23) revealed Ursula 3+4=7 (Grade 2) prostate cancer in all biopsy sites.  - PSMA scan (1/4/24) revealed "biopsy-proven prostate malignancy with bladder invasion.  Tracer-avid lesions concerning for supraclavicular, thoracic, abdominopelvic, and osseous metastases."  - he began bicalutamide in January 2024.  - he underwent TURBT/TURP on 1/24/24.  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  - during trip to [place], he took bicalutamide, which is cheaper. He has not gone back on enzalutamide since the trip. He is currently taking bicalutamide. He would like to continue bicalutamide for now.  - Labs have been reviewed. Hemoglobin is 13.4 g/dL. PSA is 0.03 ng/mL  - okay to proceed with leuprolide today.  - he is going to [place]. Will push next eligard back from march to April 2025.  - recommend dental clearance in order to proceed with denosumab q12 weeks.    - return to clinic in April 2025 in preparation for next dose of Eligard.    2. Hypertension  - blood pressure is normal today.  - continue losartan    3. Anemia due to cancer  - Labs have been reviewed. Hemoglobin is 13.4 g/dL.  - continue to monitor    4. Advance Care Planning     Power of   After our discussion (at previous visit), the patient decided to complete a HCPOA and appointed his  wife Taniya Marquez ([phone]) and friend Ernesto Peters ([phone]) .         - return to clinic in April 2025 in preparation for next dose of Eligard.    Yasir Chi M.D.  Hematology/Oncology  [facility] - [address], [address]  [place] [address]  Phone: [phone]  Fax: [phone]  "

## 2024-12-17 ENCOUNTER — LAB VISIT (OUTPATIENT)
Dept: LAB | Facility: HOSPITAL | Age: 64
End: 2024-12-17
Attending: INTERNAL MEDICINE
Payer: MEDICARE

## 2024-12-17 ENCOUNTER — INFUSION (OUTPATIENT)
Dept: INFUSION THERAPY | Facility: HOSPITAL | Age: 64
End: 2024-12-17
Attending: INTERNAL MEDICINE
Payer: MEDICARE

## 2024-12-17 ENCOUNTER — OFFICE VISIT (OUTPATIENT)
Dept: HEMATOLOGY/ONCOLOGY | Facility: CLINIC | Age: 64
End: 2024-12-17
Payer: MEDICARE

## 2024-12-17 VITALS
TEMPERATURE: 98 F | WEIGHT: 193.56 LBS | OXYGEN SATURATION: 98 % | DIASTOLIC BLOOD PRESSURE: 69 MMHG | SYSTOLIC BLOOD PRESSURE: 119 MMHG | HEIGHT: 71 IN | SYSTOLIC BLOOD PRESSURE: 119 MMHG | HEIGHT: 71 IN | HEART RATE: 73 BPM | BODY MASS INDEX: 27.1 KG/M2 | DIASTOLIC BLOOD PRESSURE: 69 MMHG | WEIGHT: 193.56 LBS | TEMPERATURE: 98 F | RESPIRATION RATE: 18 BRPM | OXYGEN SATURATION: 98 % | BODY MASS INDEX: 27.1 KG/M2 | HEART RATE: 73 BPM

## 2024-12-17 DIAGNOSIS — C77.8 SECONDARY AND MALIGNANT NEOPLASM OF LYMPH NODES OF MULTIPLE SITES: ICD-10-CM

## 2024-12-17 DIAGNOSIS — I15.2 HYPERTENSION ASSOCIATED WITH TYPE 2 DIABETES MELLITUS: ICD-10-CM

## 2024-12-17 DIAGNOSIS — C61 PROSTATE CANCER: Primary | ICD-10-CM

## 2024-12-17 DIAGNOSIS — C79.51 SECONDARY MALIGNANT NEOPLASM OF BONE: ICD-10-CM

## 2024-12-17 DIAGNOSIS — D63.0 ANEMIA IN NEOPLASTIC DISEASE: ICD-10-CM

## 2024-12-17 DIAGNOSIS — E11.59 HYPERTENSION ASSOCIATED WITH TYPE 2 DIABETES MELLITUS: ICD-10-CM

## 2024-12-17 DIAGNOSIS — C61 PROSTATE CANCER: ICD-10-CM

## 2024-12-17 LAB
ALBUMIN SERPL BCP-MCNC: 3.8 G/DL (ref 3.5–5.2)
ALP SERPL-CCNC: 68 U/L (ref 40–150)
ALT SERPL W/O P-5'-P-CCNC: 19 U/L (ref 10–44)
ANION GAP SERPL CALC-SCNC: 11 MMOL/L (ref 8–16)
AST SERPL-CCNC: 19 U/L (ref 10–40)
BASOPHILS # BLD AUTO: 0.07 K/UL (ref 0–0.2)
BASOPHILS NFR BLD: 1.1 % (ref 0–1.9)
BILIRUB SERPL-MCNC: 0.4 MG/DL (ref 0.1–1)
BUN SERPL-MCNC: 26 MG/DL (ref 8–23)
CALCIUM SERPL-MCNC: 9.6 MG/DL (ref 8.7–10.5)
CHLORIDE SERPL-SCNC: 107 MMOL/L (ref 95–110)
CO2 SERPL-SCNC: 20 MMOL/L (ref 23–29)
COMPLEXED PSA SERPL-MCNC: 0.03 NG/ML (ref 0–4)
CREAT SERPL-MCNC: 1.2 MG/DL (ref 0.5–1.4)
DIFFERENTIAL METHOD BLD: ABNORMAL
EOSINOPHIL # BLD AUTO: 0.5 K/UL (ref 0–0.5)
EOSINOPHIL NFR BLD: 7.1 % (ref 0–8)
ERYTHROCYTE [DISTWIDTH] IN BLOOD BY AUTOMATED COUNT: 13 % (ref 11.5–14.5)
EST. GFR  (NO RACE VARIABLE): >60 ML/MIN/1.73 M^2
GLUCOSE SERPL-MCNC: 151 MG/DL (ref 70–110)
HCT VFR BLD AUTO: 38.8 % (ref 40–54)
HGB BLD-MCNC: 12.8 G/DL (ref 14–18)
IMM GRANULOCYTES # BLD AUTO: 0.05 K/UL (ref 0–0.04)
IMM GRANULOCYTES NFR BLD AUTO: 0.8 % (ref 0–0.5)
LYMPHOCYTES # BLD AUTO: 2.3 K/UL (ref 1–4.8)
LYMPHOCYTES NFR BLD: 36.4 % (ref 18–48)
MCH RBC QN AUTO: 30.9 PG (ref 27–31)
MCHC RBC AUTO-ENTMCNC: 33 G/DL (ref 32–36)
MCV RBC AUTO: 94 FL (ref 82–98)
MONOCYTES # BLD AUTO: 0.6 K/UL (ref 0.3–1)
MONOCYTES NFR BLD: 9.1 % (ref 4–15)
NEUTROPHILS # BLD AUTO: 2.9 K/UL (ref 1.8–7.7)
NEUTROPHILS NFR BLD: 45.5 % (ref 38–73)
NRBC BLD-RTO: 0 /100 WBC
PLATELET # BLD AUTO: 238 K/UL (ref 150–450)
PMV BLD AUTO: 9.8 FL (ref 9.2–12.9)
POTASSIUM SERPL-SCNC: 4.4 MMOL/L (ref 3.5–5.1)
PROT SERPL-MCNC: 7.9 G/DL (ref 6–8.4)
RBC # BLD AUTO: 4.14 M/UL (ref 4.6–6.2)
SODIUM SERPL-SCNC: 138 MMOL/L (ref 136–145)
TESTOST SERPL-MCNC: 14 NG/DL (ref 304–1227)
WBC # BLD AUTO: 6.35 K/UL (ref 3.9–12.7)

## 2024-12-17 PROCEDURE — 1160F RVW MEDS BY RX/DR IN RCRD: CPT | Mod: CPTII,S$GLB,, | Performed by: INTERNAL MEDICINE

## 2024-12-17 PROCEDURE — 1101F PT FALLS ASSESS-DOCD LE1/YR: CPT | Mod: CPTII,S$GLB,, | Performed by: INTERNAL MEDICINE

## 2024-12-17 PROCEDURE — 1159F MED LIST DOCD IN RCRD: CPT | Mod: CPTII,S$GLB,, | Performed by: INTERNAL MEDICINE

## 2024-12-17 PROCEDURE — 99215 OFFICE O/P EST HI 40 MIN: CPT | Mod: S$GLB,,, | Performed by: INTERNAL MEDICINE

## 2024-12-17 PROCEDURE — 96402 CHEMO HORMON ANTINEOPL SQ/IM: CPT

## 2024-12-17 PROCEDURE — 3066F NEPHROPATHY DOC TX: CPT | Mod: CPTII,S$GLB,, | Performed by: INTERNAL MEDICINE

## 2024-12-17 PROCEDURE — 1126F AMNT PAIN NOTED NONE PRSNT: CPT | Mod: CPTII,S$GLB,, | Performed by: INTERNAL MEDICINE

## 2024-12-17 PROCEDURE — 63600175 PHARM REV CODE 636 W HCPCS: Mod: JZ,JG | Performed by: INTERNAL MEDICINE

## 2024-12-17 PROCEDURE — 84153 ASSAY OF PSA TOTAL: CPT | Performed by: INTERNAL MEDICINE

## 2024-12-17 PROCEDURE — 3288F FALL RISK ASSESSMENT DOCD: CPT | Mod: CPTII,S$GLB,, | Performed by: INTERNAL MEDICINE

## 2024-12-17 PROCEDURE — 80053 COMPREHEN METABOLIC PANEL: CPT | Performed by: INTERNAL MEDICINE

## 2024-12-17 PROCEDURE — 3051F HG A1C>EQUAL 7.0%<8.0%: CPT | Mod: CPTII,S$GLB,, | Performed by: INTERNAL MEDICINE

## 2024-12-17 PROCEDURE — 99999 PR PBB SHADOW E&M-EST. PATIENT-LVL III: CPT | Mod: PBBFAC,,, | Performed by: INTERNAL MEDICINE

## 2024-12-17 PROCEDURE — 1157F ADVNC CARE PLAN IN RCRD: CPT | Mod: CPTII,S$GLB,, | Performed by: INTERNAL MEDICINE

## 2024-12-17 PROCEDURE — 3074F SYST BP LT 130 MM HG: CPT | Mod: CPTII,S$GLB,, | Performed by: INTERNAL MEDICINE

## 2024-12-17 PROCEDURE — 84403 ASSAY OF TOTAL TESTOSTERONE: CPT | Performed by: INTERNAL MEDICINE

## 2024-12-17 PROCEDURE — 3008F BODY MASS INDEX DOCD: CPT | Mod: CPTII,S$GLB,, | Performed by: INTERNAL MEDICINE

## 2024-12-17 PROCEDURE — 85025 COMPLETE CBC W/AUTO DIFF WBC: CPT | Performed by: INTERNAL MEDICINE

## 2024-12-17 PROCEDURE — 3061F NEG MICROALBUMINURIA REV: CPT | Mod: CPTII,S$GLB,, | Performed by: INTERNAL MEDICINE

## 2024-12-17 PROCEDURE — 3078F DIAST BP <80 MM HG: CPT | Mod: CPTII,S$GLB,, | Performed by: INTERNAL MEDICINE

## 2024-12-17 PROCEDURE — 4010F ACE/ARB THERAPY RXD/TAKEN: CPT | Mod: CPTII,S$GLB,, | Performed by: INTERNAL MEDICINE

## 2024-12-17 PROCEDURE — 36415 COLL VENOUS BLD VENIPUNCTURE: CPT | Performed by: INTERNAL MEDICINE

## 2024-12-17 RX ORDER — BICALUTAMIDE 50 MG/1
50 TABLET, FILM COATED ORAL DAILY
Qty: 180 TABLET | Refills: 1 | Status: SHIPPED | OUTPATIENT
Start: 2024-12-17 | End: 2025-12-17

## 2024-12-17 RX ADMIN — LEUPROLIDE ACETATE 22.5 MG: 22.5 INJECTION, SUSPENSION, EXTENDED RELEASE SUBCUTANEOUS at 08:12

## 2025-02-07 ENCOUNTER — TELEPHONE (OUTPATIENT)
Dept: ENDOSCOPY | Facility: HOSPITAL | Age: 65
End: 2025-02-07

## 2025-02-07 NOTE — TELEPHONE ENCOUNTER
Called patient to schedule Colonoscopy using  # [phone].  Attempted 3 numbers with only one that allowed to leave a message.

## 2025-03-24 DIAGNOSIS — Z00.00 ENCOUNTER FOR MEDICARE ANNUAL WELLNESS EXAM: ICD-10-CM

## 2025-03-31 ENCOUNTER — TELEPHONE (OUTPATIENT)
Dept: INFUSION THERAPY | Facility: HOSPITAL | Age: 65
End: 2025-03-31
Payer: MEDICARE

## 2025-03-31 NOTE — TELEPHONE ENCOUNTER
Pt called the infusion center to reschedule labs, office visit, and injection. Confirmed new appts date and times   Labs  5/9  Office visit and injection on 5/12 at 1040a

## 2025-05-01 DIAGNOSIS — E11.9 TYPE 2 DIABETES MELLITUS WITHOUT COMPLICATION, UNSPECIFIED WHETHER LONG TERM INSULIN USE: ICD-10-CM

## 2025-05-05 PROBLEM — C77.8: Status: ACTIVE | Noted: 2025-05-05

## 2025-05-05 NOTE — PROGRESS NOTES
"PATIENT: Waldemar Marquez  MRN: [id number]  DATE: 5/12/2025    Diagnosis:   1. Prostate cancer    2. 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 Secondary malignant neoplasm of bone    4. Anemia in neoplastic disease    5. Hypertension associated with type 2 diabetes mellitus      Chief Complaint: Prostate Cancer    Oncologic History:      Oncologic History Prostate cancer      Oncologic Treatment Bicalutamide  Eligard      Pathology 1/24/24:  PROSTATE CHIPS, TRANSURETHRAL RESECTION:   - Prostatic adenocarcinoma, Ursula score 4+3=7 (Grade group 3) with tertiary pattern 5 involving 95% of the totally submitted specimen.   - Percentage of pattern 4 = 60%.   - Percentage of pattern 5 = <5%.   - Cribriform glands are present.   - Perineural invasion is present.   - Intraductal carcinoma is present and included in the percentage of pattern 4.     12/27/23:  BIOPSIES OF 1. RIGHT BASE, 2. RIGHT MID, 3. RIGHT APEX, 4. LEFT APEX, 5. LEFT MID, 6. LEFT BASE OF THE PROSTATE:   ADENOCARCINOMA TOTAL URSULA SCORE 7 (3+4)--GRADE GROUP 2       Subjective:    History of Present Illness: Mr. Marquez is a 64 y.o. male who presented in January 2024 for evaluation and management of prostate cancer. He is referred by Dr. Ervin.    - presenting symptoms were nocturia, lower urinary tract symptoms  - MRI prostate (12/11/23) revealed "  Diffusely abnormal appearance of the prostate highly concerning for malignancy (PI-RADS 5).  Suspected involvement of the bilateral seminal vesicles" as well as a suspicious right pelvic lymph node.  - PSA (12/27/23) was elevated at 13 ng/mL, 26 when corrected for finasteride  - biopsy of prostate (12/27/23) revealed Felt 3+4=7 (Grade 2) prostate cancer in all biopsy sites.  - PSMA scan (1/4/24) revealed "biopsy-proven prostate malignancy with bladder invasion.  Tracer-avid lesions concerning for supraclavicular, thoracic, abdominopelvic, and osseous metastases."  - per Dr. Ervin's " "note dated 1/10/24:  "Needs cystoscopy and TURBT and TURP to de-obstruct him, also need ureteroscopy and laser lithotripsy and stent placement."     - he began bicalutamide in January 2024.    - during trip to [place] in 2024, he took bicalutamide, which is cheaper. He has not gone back on enzalutamide since the trip. He is currently taking bicalutamide. He would like to continue bicalutamide for now.    Interval history:  - he presents for a follow-up appointment for his prostate cancer.  - he went to [place].  - today, he is doing well. He is scheduled for [facility] today. No complaints today. He denies pain, weight loss, shortness of breath, chest pain, nausea, vomiting, diarrhea, constipation.       Past medical, surgical, family, and social histories have been reviewed and updated below.    Past Medical History:   Past Medical History:   Diagnosis Date    Dyslipidemia 02/25/2013    Hypertension associated with type 2 diabetes mellitus 12/19/2023    Kidney stone 12/19/2023    Postablative hypothyroidism 02/25/2013    radioactive ablation    Prostate cancer 01/16/2024    Type 2 diabetes mellitus without complication, without long-term current use of insulin 03/14/2011    Vitamin D deficiency 02/25/2013       Past Surgical History:   Past Surgical History:   Procedure Laterality Date    CYSTOSCOPY WITH URETEROSCOPY, RETROGRADE PYELOGRAPHY, AND INSERTION OF STENT Left 1/24/2024    Procedure: CYSTOSCOPY, WITH RETROGRADE PYELOGRAM AND URETERAL STENT INSERTION;  Surgeon: Reinaldo Ervin MD;  Location: [facility] OR;  Service: Urology;  Laterality: Left;    CYSTOSCOPY WITH URETEROSCOPY, RETROGRADE PYELOGRAPHY, AND INSERTION OF STENT Left 2/21/2024    Procedure: CYSTOSCOPY, WITH RETROGRADE PYELOGRAM AND URETERAL STENT INSERTION;  Surgeon: Reinaldo Ervin MD;  Location: [facility] OR;  Service: Urology;  Laterality: Left;    CYSTOURETEROSCOPY, WITH HOLMIUM LASER LITHOTRIPSY OF URETERAL CALCULUS AND STENT " INSERTION Left 2/21/2024    Procedure: CYSTOURETEROSCOPY, WITH HOLMIUM LASER LITHOTRIPSY OF URETERAL CALCULUS AND STENT INSERTION;  Surgeon: Reinaldo Ervin MD;  Location: [facility] OR;  Service: Urology;  Laterality: Left;  Please have semirigid ureteroscope, digital flexible ureteroscope, ellis laser, and tech (Maurice) if available.    EXTRACTION - STONE Left 2/21/2024    Procedure: EXTRACTION - STONE;  Surgeon: Reinaldo Ervin MD;  Location: [facility] OR;  Service: Urology;  Laterality: Left;    TRANSURETHRAL RESECTION OF PROSTATE N/A 1/24/2024    Procedure: TURP (TRANSURETHRAL RESECTION OF PROSTATE);  Surgeon: Reinaldo Ervin MD;  Location: [facility] OR;  Service: Urology;  Laterality: N/A;  Bipolar TURBT, 1 hr       Family History:   Family History   Problem Relation Name Age of Onset    Breast cancer Sister Ms. Marquez        Social History:  reports that he has been smoking cigarettes. He has a 17.5 pack-year smoking history. He has never used smokeless tobacco. He reports that he does not drink alcohol and does not use drugs.    Allergies:  Review of patient's allergies indicates:  No Known Allergies    Medications:  Current Outpatient Medications   Medication Sig Dispense Refill    atorvastatin (LIPITOR) 20 MG tablet Take 1 tablet (20 mg total) by mouth nightly. 90 tablet 3    bicalutamide (CASODEX) 50 MG Tab Take 1 tablet (50 mg total) by mouth once daily. 180 tablet 1    cholecalciferol, vitamin D3, 1,250 mcg (50,000 unit) capsule Take 1 capsule (50,000 Units total) by mouth once a week. 12 capsule 3    finasteride (PROSCAR) 5 mg tablet Take 1 tablet (5 mg total) by mouth once daily. 90 tablet 3    glimepiride (AMARYL) 2 MG tablet Take 1 tablet (2 mg total) by mouth 2 (two) times daily with meals. 180 tablet 3    levothyroxine (SYNTHROID) 137 MCG Tab tablet Take 1 tablet (137 mcg total) by mouth once daily. 90 tablet 3    losartan (COZAAR) 25 MG tablet Take 1 tablet (25 mg total) by mouth nightly. 90 tablet 3    metFORMIN  (GLUCOPHAGE) 500 MG tablet Take 1 tablet (500 mg total) by mouth 2 (two) times daily. 180 tablet 3    tamsulosin (FLOMAX) 0.4 mg Cap Take 1 capsule (0.4 mg total) by mouth once daily. 90 capsule 3     No current facility-administered medications for this visit.       Review of Systems   Constitutional:  Positive for fatigue.   HENT:  Negative for sore throat.    Eyes:  Negative for visual disturbance.   Respiratory:  Negative for shortness of breath.    Cardiovascular:  Negative for chest pain.   Gastrointestinal:  Negative for abdominal pain.   Genitourinary:  Negative for dysuria.   Musculoskeletal:  Negative for back pain.   Skin:  Negative for rash.   Neurological:  Negative for headaches.   Hematological:  Negative for adenopathy.   Psychiatric/Behavioral:  The patient is not nervous/anxious.        ECOG Performance Status:   ECOG SCORE    0 - Fully active-able to carry on all pre-disease performance without restriction         Objective:      Vitals:   Vitals:    05/12/25 1042   BP: 126/79   BP Location: Right arm   Patient Position: Sitting   Pulse: 68   Resp: 16   Temp: 97.7 °F (36.5 °C)   TempSrc: Oral   SpO2: 98%   Weight: 86.7 kg (191 lb 2.2 oz)       BMI: Body mass index is 26.66 kg/m².    Physical Exam  Vitals and nursing note reviewed.   Constitutional:       Appearance: He is well-developed.   HENT:      Head: Normocephalic and atraumatic.   Eyes:      Pupils: Pupils are equal, round, and reactive to light.   Cardiovascular:      Rate and Rhythm: Normal rate and regular rhythm.   Pulmonary:      Effort: Pulmonary effort is normal.      Breath sounds: Normal breath sounds.   Abdominal:      General: Bowel sounds are normal.      Palpations: Abdomen is soft.   Musculoskeletal:         General: Normal range of motion.      Cervical back: Normal range of motion and neck supple.   Skin:     General: Skin is warm and dry.   Neurological:      Mental Status: He is alert and oriented to person, place, and  "time.   Psychiatric:         Behavior: Behavior normal.         Thought Content: Thought content normal.         Judgment: Judgment normal.         Laboratory Data:  Labs have been reviewed.    Lab Results   Component Value Date    WBC 7.35 05/09/2025    HGB 13.6 (L) 05/09/2025    HCT 41.0 05/09/2025    MCV 91 05/09/2025     05/09/2025       PSA 0.02 ng/mL          Imaging:    PSMA scan (1/4/24): I have personally reviewed the images    Findings compatible with biopsy-proven prostate malignancy with bladder invasion.  Tracer-avid lesions concerning for supraclavicular, thoracic, abdominopelvic, and osseous metastases as detailed above.     Moderate left hydroureteronephrosis to the level of a 1.4 cm proximal ureteral stone.    MRI prostate (12/11/23):  Diffusely abnormal appearance of the prostate highly concerning for malignancy (PI-RADS 5).  Suspected involvement of the bilateral seminal vesicles.     Polypoid mass in the right anterolateral bladder wall concerning for involvement of the bladder wall, though primary bladder malignancy not excluded.     Suspicious right pelvic lymph node.      Assessment:       1. Prostate cancer    2. 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 Secondary malignant neoplasm of bone    4. Anemia in neoplastic disease    5. Hypertension associated with type 2 diabetes mellitus           Plan:     Prostate cancer  - I have reviewed his chart  - presenting symptoms were nocturia, lower urinary tract symptoms  - MRI prostate (12/11/23) revealed "  Diffusely abnormal appearance of the prostate highly concerning for malignancy (PI-RADS 5).  Suspected involvement of the bilateral seminal vesicles" as well as a suspicious right pelvic lymph node.  - PSA (12/27/23) was elevated at 13 ng/mL, 26 when corrected for finasteride  - biopsy of prostate (12/27/23) revealed Ursula 3+4=7 (Grade 2) prostate cancer in all biopsy sites.  - PSMA scan (1/4/24) revealed " ""biopsy-proven prostate malignancy with bladder invasion.  Tracer-avid lesions concerning for supraclavicular, thoracic, abdominopelvic, and osseous metastases."  - he began bicalutamide in January 2024.  - he underwent TURBT/TURP on 1/24/24.  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  - during trip to [place], he took bicalutamide, which is cheaper. He has not gone back on enzalutamide since the trip. He is currently taking bicalutamide. He would like to continue bicalutamide for now.  - Labs have been reviewed. Hemoglobin is 13.6 g/dL. PSA is 0.02 ng/mL  - okay to proceed with leuprolide today.  - recommend dental clearance in order to proceed with denosumab q12 weeks.    - return to clinic in August 2025 in preparation for next dose of Eligard.    2. Hypertension in setting of diabetes.  - blood pressure is normal today.  - last hemoglobin A1c was 7.5 %  - check hemoglobin A1c today.  - continue losartan    3. Anemia due to cancer  - Labs have been reviewed. Hemoglobin is 13.6 g/dL.  - continue to monitor    4. Advance Care Planning     Power of   After our discussion (at previous visit), the patient decided to complete a HCPOA and appointed his wife Taniya Marquez ([phone]) and friend Ernesto Peters ([phone]).         - return to clinic in August 2025 in preparation for next dose of Eligard.    Yasir Chi M.D.  Hematology/Oncology  [facility] - [address], [address]  [place] [address]  Phone: [phone]  Fax: [phone]  "

## 2025-05-09 ENCOUNTER — LAB VISIT (OUTPATIENT)
Dept: LAB | Facility: HOSPITAL | Age: 65
End: 2025-05-09
Attending: INTERNAL MEDICINE
Payer: MEDICARE

## 2025-05-09 DIAGNOSIS — C61 PROSTATE CANCER: ICD-10-CM

## 2025-05-09 LAB
ABSOLUTE EOSINOPHIL (OHS): 0.36 K/UL
ABSOLUTE MONOCYTE (OHS): 0.73 K/UL (ref 0.3–1)
ABSOLUTE NEUTROPHIL COUNT (OHS): 3.54 K/UL (ref 1.8–7.7)
ALBUMIN SERPL BCP-MCNC: 4 G/DL (ref 3.5–5.2)
ALP SERPL-CCNC: 70 UNIT/L (ref 40–150)
ALT SERPL W/O P-5'-P-CCNC: 34 UNIT/L (ref 10–44)
ANION GAP (OHS): 9 MMOL/L (ref 8–16)
AST SERPL-CCNC: 24 UNIT/L (ref 11–45)
BASOPHILS # BLD AUTO: 0.04 K/UL
BASOPHILS NFR BLD AUTO: 0.5 %
BILIRUB SERPL-MCNC: 0.5 MG/DL (ref 0.1–1)
BUN SERPL-MCNC: 22 MG/DL (ref 8–23)
CALCIUM SERPL-MCNC: 10 MG/DL (ref 8.7–10.5)
CHLORIDE SERPL-SCNC: 106 MMOL/L (ref 95–110)
CO2 SERPL-SCNC: 22 MMOL/L (ref 23–29)
CREAT SERPL-MCNC: 1.1 MG/DL (ref 0.5–1.4)
ERYTHROCYTE [DISTWIDTH] IN BLOOD BY AUTOMATED COUNT: 12.8 % (ref 11.5–14.5)
GFR SERPLBLD CREATININE-BSD FMLA CKD-EPI: >60 ML/MIN/1.73/M2
GLUCOSE SERPL-MCNC: 126 MG/DL (ref 70–110)
HCT VFR BLD AUTO: 41 % (ref 40–54)
HGB BLD-MCNC: 13.6 GM/DL (ref 14–18)
IMM GRANULOCYTES # BLD AUTO: 0.03 K/UL (ref 0–0.04)
IMM GRANULOCYTES NFR BLD AUTO: 0.4 % (ref 0–0.5)
LYMPHOCYTES # BLD AUTO: 2.65 K/UL (ref 1–4.8)
MCH RBC QN AUTO: 30 PG (ref 27–31)
MCHC RBC AUTO-ENTMCNC: 33.2 G/DL (ref 32–36)
MCV RBC AUTO: 91 FL (ref 82–98)
NUCLEATED RBC (/100WBC) (OHS): 0 /100 WBC
PLATELET # BLD AUTO: 245 K/UL (ref 150–450)
PMV BLD AUTO: 9.7 FL (ref 9.2–12.9)
POTASSIUM SERPL-SCNC: 4.2 MMOL/L (ref 3.5–5.1)
PROT SERPL-MCNC: 8.2 GM/DL (ref 6–8.4)
PSA SERPL-MCNC: 0.02 NG/ML
RBC # BLD AUTO: 4.53 M/UL (ref 4.6–6.2)
RELATIVE EOSINOPHIL (OHS): 4.9 %
RELATIVE LYMPHOCYTE (OHS): 36.1 % (ref 18–48)
RELATIVE MONOCYTE (OHS): 9.9 % (ref 4–15)
RELATIVE NEUTROPHIL (OHS): 48.2 % (ref 38–73)
SODIUM SERPL-SCNC: 137 MMOL/L (ref 136–145)
TESTOST SERPL-MCNC: 13 NG/DL (ref 304–1227)
WBC # BLD AUTO: 7.35 K/UL (ref 3.9–12.7)

## 2025-05-09 PROCEDURE — 36415 COLL VENOUS BLD VENIPUNCTURE: CPT

## 2025-05-09 PROCEDURE — 84153 ASSAY OF PSA TOTAL: CPT

## 2025-05-09 PROCEDURE — 82310 ASSAY OF CALCIUM: CPT

## 2025-05-09 PROCEDURE — 85025 COMPLETE CBC W/AUTO DIFF WBC: CPT

## 2025-05-09 PROCEDURE — 84403 ASSAY OF TOTAL TESTOSTERONE: CPT

## 2025-05-12 ENCOUNTER — OFFICE VISIT (OUTPATIENT)
Dept: HEMATOLOGY/ONCOLOGY | Facility: CLINIC | Age: 65
End: 2025-05-12
Payer: MEDICARE

## 2025-05-12 ENCOUNTER — LAB VISIT (OUTPATIENT)
Dept: LAB | Facility: HOSPITAL | Age: 65
End: 2025-05-12
Attending: INTERNAL MEDICINE
Payer: MEDICARE

## 2025-05-12 ENCOUNTER — INFUSION (OUTPATIENT)
Dept: INFUSION THERAPY | Facility: HOSPITAL | Age: 65
End: 2025-05-12
Attending: INTERNAL MEDICINE
Payer: MEDICARE

## 2025-05-12 VITALS
DIASTOLIC BLOOD PRESSURE: 79 MMHG | OXYGEN SATURATION: 98 % | HEART RATE: 68 BPM | RESPIRATION RATE: 16 BRPM | TEMPERATURE: 98 F | SYSTOLIC BLOOD PRESSURE: 126 MMHG | WEIGHT: 191.13 LBS | BODY MASS INDEX: 26.66 KG/M2

## 2025-05-12 VITALS
WEIGHT: 191.13 LBS | OXYGEN SATURATION: 98 % | DIASTOLIC BLOOD PRESSURE: 79 MMHG | HEART RATE: 68 BPM | TEMPERATURE: 98 F | BODY MASS INDEX: 26.66 KG/M2 | SYSTOLIC BLOOD PRESSURE: 126 MMHG | RESPIRATION RATE: 16 BRPM

## 2025-05-12 DIAGNOSIS — C77.8 SECONDARY AND MALIGNANT NEOPLASM OF LYMPH NODES OF MULTIPLE SITES: ICD-10-CM

## 2025-05-12 DIAGNOSIS — E11.59 HYPERTENSION ASSOCIATED WITH TYPE 2 DIABETES MELLITUS: ICD-10-CM

## 2025-05-12 DIAGNOSIS — I15.2 HYPERTENSION ASSOCIATED WITH TYPE 2 DIABETES MELLITUS: ICD-10-CM

## 2025-05-12 DIAGNOSIS — C61 PROSTATE CANCER: Primary | ICD-10-CM

## 2025-05-12 DIAGNOSIS — C79.51 SECONDARY MALIGNANT NEOPLASM OF BONE: ICD-10-CM

## 2025-05-12 DIAGNOSIS — D63.0 ANEMIA IN NEOPLASTIC DISEASE: ICD-10-CM

## 2025-05-12 DIAGNOSIS — C61 PROSTATE CANCER: ICD-10-CM

## 2025-05-12 LAB
ABSOLUTE EOSINOPHIL (OHS): 0.38 K/UL
ABSOLUTE MONOCYTE (OHS): 0.75 K/UL (ref 0.3–1)
ABSOLUTE NEUTROPHIL COUNT (OHS): 2.71 K/UL (ref 1.8–7.7)
ALBUMIN SERPL BCP-MCNC: 4.1 G/DL (ref 3.5–5.2)
ALP SERPL-CCNC: 70 UNIT/L (ref 40–150)
ALT SERPL W/O P-5'-P-CCNC: 38 UNIT/L (ref 10–44)
ANION GAP (OHS): 10 MMOL/L (ref 8–16)
AST SERPL-CCNC: 25 UNIT/L (ref 11–45)
BASOPHILS # BLD AUTO: 0.06 K/UL
BASOPHILS NFR BLD AUTO: 1 %
BILIRUB SERPL-MCNC: 0.6 MG/DL (ref 0.1–1)
BUN SERPL-MCNC: 24 MG/DL (ref 8–23)
CALCIUM SERPL-MCNC: 10 MG/DL (ref 8.7–10.5)
CHLORIDE SERPL-SCNC: 107 MMOL/L (ref 95–110)
CO2 SERPL-SCNC: 22 MMOL/L (ref 23–29)
CREAT SERPL-MCNC: 1.2 MG/DL (ref 0.5–1.4)
EAG (OHS): 189 MG/DL (ref 68–131)
ERYTHROCYTE [DISTWIDTH] IN BLOOD BY AUTOMATED COUNT: 12.9 % (ref 11.5–14.5)
GFR SERPLBLD CREATININE-BSD FMLA CKD-EPI: >60 ML/MIN/1.73/M2
GLUCOSE SERPL-MCNC: 133 MG/DL (ref 70–110)
HBA1C MFR BLD: 8.2 % (ref 4–5.6)
HCT VFR BLD AUTO: 38 % (ref 40–54)
HGB BLD-MCNC: 12.9 GM/DL (ref 14–18)
IMM GRANULOCYTES # BLD AUTO: 0.03 K/UL (ref 0–0.04)
IMM GRANULOCYTES NFR BLD AUTO: 0.5 % (ref 0–0.5)
LYMPHOCYTES # BLD AUTO: 2.28 K/UL (ref 1–4.8)
MCH RBC QN AUTO: 31.2 PG (ref 27–31)
MCHC RBC AUTO-ENTMCNC: 33.9 G/DL (ref 32–36)
MCV RBC AUTO: 92 FL (ref 82–98)
NUCLEATED RBC (/100WBC) (OHS): 0 /100 WBC
PLATELET # BLD AUTO: 229 K/UL (ref 150–450)
PMV BLD AUTO: 9.8 FL (ref 9.2–12.9)
POTASSIUM SERPL-SCNC: 4 MMOL/L (ref 3.5–5.1)
PROT SERPL-MCNC: 8.3 GM/DL (ref 6–8.4)
PSA SERPL-MCNC: 0.02 NG/ML
RBC # BLD AUTO: 4.14 M/UL (ref 4.6–6.2)
RELATIVE EOSINOPHIL (OHS): 6.1 %
RELATIVE LYMPHOCYTE (OHS): 36.7 % (ref 18–48)
RELATIVE MONOCYTE (OHS): 12.1 % (ref 4–15)
RELATIVE NEUTROPHIL (OHS): 43.6 % (ref 38–73)
SODIUM SERPL-SCNC: 139 MMOL/L (ref 136–145)
TESTOST SERPL-MCNC: 10 NG/DL (ref 304–1227)
WBC # BLD AUTO: 6.21 K/UL (ref 3.9–12.7)

## 2025-05-12 PROCEDURE — 1101F PT FALLS ASSESS-DOCD LE1/YR: CPT | Mod: CPTII,S$GLB,, | Performed by: INTERNAL MEDICINE

## 2025-05-12 PROCEDURE — 3074F SYST BP LT 130 MM HG: CPT | Mod: CPTII,S$GLB,, | Performed by: INTERNAL MEDICINE

## 2025-05-12 PROCEDURE — 36415 COLL VENOUS BLD VENIPUNCTURE: CPT

## 2025-05-12 PROCEDURE — 84153 ASSAY OF PSA TOTAL: CPT

## 2025-05-12 PROCEDURE — 1160F RVW MEDS BY RX/DR IN RCRD: CPT | Mod: CPTII,S$GLB,, | Performed by: INTERNAL MEDICINE

## 2025-05-12 PROCEDURE — 3288F FALL RISK ASSESSMENT DOCD: CPT | Mod: CPTII,S$GLB,, | Performed by: INTERNAL MEDICINE

## 2025-05-12 PROCEDURE — 63600175 PHARM REV CODE 636 W HCPCS: Mod: JZ,TB | Performed by: INTERNAL MEDICINE

## 2025-05-12 PROCEDURE — 1126F AMNT PAIN NOTED NONE PRSNT: CPT | Mod: CPTII,S$GLB,, | Performed by: INTERNAL MEDICINE

## 2025-05-12 PROCEDURE — 82040 ASSAY OF SERUM ALBUMIN: CPT

## 2025-05-12 PROCEDURE — 84403 ASSAY OF TOTAL TESTOSTERONE: CPT

## 2025-05-12 PROCEDURE — 3078F DIAST BP <80 MM HG: CPT | Mod: CPTII,S$GLB,, | Performed by: INTERNAL MEDICINE

## 2025-05-12 PROCEDURE — 3008F BODY MASS INDEX DOCD: CPT | Mod: CPTII,S$GLB,, | Performed by: INTERNAL MEDICINE

## 2025-05-12 PROCEDURE — 1157F ADVNC CARE PLAN IN RCRD: CPT | Mod: CPTII,S$GLB,, | Performed by: INTERNAL MEDICINE

## 2025-05-12 PROCEDURE — 4010F ACE/ARB THERAPY RXD/TAKEN: CPT | Mod: CPTII,S$GLB,, | Performed by: INTERNAL MEDICINE

## 2025-05-12 PROCEDURE — 1159F MED LIST DOCD IN RCRD: CPT | Mod: CPTII,S$GLB,, | Performed by: INTERNAL MEDICINE

## 2025-05-12 PROCEDURE — 83036 HEMOGLOBIN GLYCOSYLATED A1C: CPT

## 2025-05-12 PROCEDURE — 85025 COMPLETE CBC W/AUTO DIFF WBC: CPT

## 2025-05-12 PROCEDURE — 99999 PR PBB SHADOW E&M-EST. PATIENT-LVL III: CPT | Mod: PBBFAC,,, | Performed by: INTERNAL MEDICINE

## 2025-05-12 PROCEDURE — 96402 CHEMO HORMON ANTINEOPL SQ/IM: CPT

## 2025-05-12 PROCEDURE — 99215 OFFICE O/P EST HI 40 MIN: CPT | Mod: S$GLB,,, | Performed by: INTERNAL MEDICINE

## 2025-05-12 RX ADMIN — LEUPROLIDE ACETATE 22.5 MG: 22.5 INJECTION, SUSPENSION, EXTENDED RELEASE SUBCUTANEOUS at 11:05

## 2025-05-12 NOTE — NURSING
Patient tolerated Eligard injection to right abdomen well today. NAD noted upon discharge. Calendar given and reviewed with patient and family member. Plan to rtc 8/12 for next injection. Discharged home, ambulated independently.

## 2025-05-19 ENCOUNTER — OFFICE VISIT (OUTPATIENT)
Dept: INTERNAL MEDICINE | Facility: CLINIC | Age: 65
End: 2025-05-19
Payer: MEDICARE

## 2025-05-19 VITALS
BODY MASS INDEX: 26.74 KG/M2 | OXYGEN SATURATION: 98 % | HEART RATE: 62 BPM | WEIGHT: 191 LBS | SYSTOLIC BLOOD PRESSURE: 118 MMHG | DIASTOLIC BLOOD PRESSURE: 78 MMHG | HEIGHT: 71 IN

## 2025-05-19 DIAGNOSIS — Z00.00 ANNUAL PHYSICAL EXAM: Primary | ICD-10-CM

## 2025-05-19 DIAGNOSIS — Z12.11 SCREENING FOR MALIGNANT NEOPLASM OF COLON: ICD-10-CM

## 2025-05-19 DIAGNOSIS — E55.9 VITAMIN D DEFICIENCY: ICD-10-CM

## 2025-05-19 DIAGNOSIS — R35.0 BENIGN PROSTATIC HYPERPLASIA WITH URINARY FREQUENCY: ICD-10-CM

## 2025-05-19 DIAGNOSIS — N20.0 KIDNEY STONE: ICD-10-CM

## 2025-05-19 DIAGNOSIS — E11.59 HYPERTENSION ASSOCIATED WITH TYPE 2 DIABETES MELLITUS: ICD-10-CM

## 2025-05-19 DIAGNOSIS — E89.0 POSTABLATIVE HYPOTHYROIDISM: ICD-10-CM

## 2025-05-19 DIAGNOSIS — C77.8 SECONDARY AND MALIGNANT NEOPLASM OF LYMPH NODES OF MULTIPLE SITES: ICD-10-CM

## 2025-05-19 DIAGNOSIS — N40.1 BENIGN PROSTATIC HYPERPLASIA WITH URINARY FREQUENCY: ICD-10-CM

## 2025-05-19 DIAGNOSIS — C61 PROSTATE CANCER: ICD-10-CM

## 2025-05-19 DIAGNOSIS — E78.5 DYSLIPIDEMIA: ICD-10-CM

## 2025-05-19 DIAGNOSIS — E11.9 TYPE 2 DIABETES MELLITUS WITHOUT COMPLICATION, WITHOUT LONG-TERM CURRENT USE OF INSULIN: ICD-10-CM

## 2025-05-19 DIAGNOSIS — I15.2 HYPERTENSION ASSOCIATED WITH TYPE 2 DIABETES MELLITUS: ICD-10-CM

## 2025-05-19 PROCEDURE — 1126F AMNT PAIN NOTED NONE PRSNT: CPT | Mod: CPTII,S$GLB,, | Performed by: INTERNAL MEDICINE

## 2025-05-19 PROCEDURE — 1157F ADVNC CARE PLAN IN RCRD: CPT | Mod: CPTII,S$GLB,, | Performed by: INTERNAL MEDICINE

## 2025-05-19 PROCEDURE — 3288F FALL RISK ASSESSMENT DOCD: CPT | Mod: CPTII,S$GLB,, | Performed by: INTERNAL MEDICINE

## 2025-05-19 PROCEDURE — 3078F DIAST BP <80 MM HG: CPT | Mod: CPTII,S$GLB,, | Performed by: INTERNAL MEDICINE

## 2025-05-19 PROCEDURE — 3052F HG A1C>EQUAL 8.0%<EQUAL 9.0%: CPT | Mod: CPTII,S$GLB,, | Performed by: INTERNAL MEDICINE

## 2025-05-19 PROCEDURE — 1101F PT FALLS ASSESS-DOCD LE1/YR: CPT | Mod: CPTII,S$GLB,, | Performed by: INTERNAL MEDICINE

## 2025-05-19 PROCEDURE — 3008F BODY MASS INDEX DOCD: CPT | Mod: CPTII,S$GLB,, | Performed by: INTERNAL MEDICINE

## 2025-05-19 PROCEDURE — 99999 PR PBB SHADOW E&M-EST. PATIENT-LVL III: CPT | Mod: PBBFAC,,, | Performed by: INTERNAL MEDICINE

## 2025-05-19 PROCEDURE — 99214 OFFICE O/P EST MOD 30 MIN: CPT | Mod: S$GLB,,, | Performed by: INTERNAL MEDICINE

## 2025-05-19 PROCEDURE — 3074F SYST BP LT 130 MM HG: CPT | Mod: CPTII,S$GLB,, | Performed by: INTERNAL MEDICINE

## 2025-05-19 PROCEDURE — 4010F ACE/ARB THERAPY RXD/TAKEN: CPT | Mod: CPTII,S$GLB,, | Performed by: INTERNAL MEDICINE

## 2025-05-19 RX ORDER — METFORMIN HYDROCHLORIDE 1000 MG/1
1000 TABLET ORAL 2 TIMES DAILY WITH MEALS
Qty: 180 TABLET | Refills: 3 | Status: SHIPPED | OUTPATIENT
Start: 2025-05-19

## 2025-05-19 RX ORDER — GLIMEPIRIDE 2 MG/1
2 TABLET ORAL 2 TIMES DAILY WITH MEALS
Qty: 180 TABLET | Refills: 3 | Status: SHIPPED | OUTPATIENT
Start: 2025-05-19

## 2025-05-19 NOTE — PROGRESS NOTES
INTERNAL MEDICINE CLINIC  Follow-up Visit Progress Note     PRESENTING HISTORY     PCP: Pawan Garza MD    Last Clinic Visit with me: 11-    Current Chief Complaint/Problem:    Chief Complaint   Patient presents with    Annual Exam      History of Present Illness & ROS: Mr. Waldemar Marquez is a 64 y.o. male.    Review of Systems:  Review of Systems   Constitutional:  Negative for chills and fever.   Respiratory:  Negative for cough and shortness of breath.    Cardiovascular:  Negative for chest pain and palpitations.   Gastrointestinal:  Negative for abdominal pain.   Genitourinary:  Negative for flank pain.   Musculoskeletal:  Negative for joint pain.   Skin:  Negative for rash.   Neurological:  Negative for headaches.       PAST HISTORY:     Past Medical History:   Diagnosis Date    Dyslipidemia 02/25/2013    Hypertension associated with type 2 diabetes mellitus 12/19/2023    Kidney stone 12/19/2023    Postablative hypothyroidism 02/25/2013    radioactive ablation    Prostate cancer 01/16/2024    Type 2 diabetes mellitus without complication, without long-term current use of insulin 03/14/2011    Vitamin D deficiency 02/25/2013       Past Surgical History:   Procedure Laterality Date    CYSTOSCOPY WITH URETEROSCOPY, RETROGRADE PYELOGRAPHY, AND INSERTION OF STENT Left 1/24/2024    Procedure: CYSTOSCOPY, WITH RETROGRADE PYELOGRAM AND URETERAL STENT INSERTION;  Surgeon: Reinaldo Ervin MD;  Location: [facility] OR;  Service: Urology;  Laterality: Left;    CYSTOSCOPY WITH URETEROSCOPY, RETROGRADE PYELOGRAPHY, AND INSERTION OF STENT Left 2/21/2024    Procedure: CYSTOSCOPY, WITH RETROGRADE PYELOGRAM AND URETERAL STENT INSERTION;  Surgeon: Reinaldo Ervin MD;  Location: [facility] OR;  Service: Urology;  Laterality: Left;    CYSTOURETEROSCOPY, WITH HOLMIUM LASER LITHOTRIPSY OF URETERAL CALCULUS AND STENT INSERTION Left 2/21/2024    Procedure: CYSTOURETEROSCOPY, WITH HOLMIUM LASER LITHOTRIPSY OF URETERAL CALCULUS AND STENT  INSERTION;  Surgeon: Reinaldo Ervin MD;  Location: [facility] OR;  Service: Urology;  Laterality: Left;  Please have semirigid ureteroscope, digital flexible ureteroscope, ellis laser, and tech (Maurice) if available.    EXTRACTION - STONE Left 2/21/2024    Procedure: EXTRACTION - STONE;  Surgeon: Reinaldo Ervin MD;  Location: [facility] OR;  Service: Urology;  Laterality: Left;    TRANSURETHRAL RESECTION OF PROSTATE N/A 1/24/2024    Procedure: TURP (TRANSURETHRAL RESECTION OF PROSTATE);  Surgeon: Reinaldo Ervin MD;  Location: [facility] OR;  Service: Urology;  Laterality: N/A;  Bipolar TURBT, 1 hr       Family History   Problem Relation Name Age of Onset    Breast cancer Sister Ms. Marquez        Social History     Socioeconomic History    Marital status:    Tobacco Use    Smoking status: Every Day     Current packs/day: 0.25     Average packs/day: 0.3 packs/day for 55.0 years (17.5 ttl pk-yrs)     Types: Cigarettes    Smokeless tobacco: Never   Substance and Sexual Activity    Alcohol use: Never    Drug use: Never    Sexual activity: Not Currently     Partners: Female   Social History Narrative    Works at [employer]        : John        1 offspring      Social Drivers of Health     Financial Resource Strain: Patient Declined (5/6/2025)    Overall Financial Resource Strain (CARDIA)     Difficulty of Paying Living Expenses: Patient declined   Food Insecurity: Patient Declined (5/6/2025)    Hunger Vital Sign     Worried About Running Out of Food in the Last Year: Patient declined     Ran Out of Food in the Last Year: Patient declined   Transportation Needs: Patient Declined (5/6/2025)    PRAPARE - Transportation     Lack of Transportation (Medical): Patient declined     Lack of Transportation (Non-Medical): Patient declined   Physical Activity: Sufficiently Active (5/6/2025)    Exercise Vital Sign     Days of Exercise per Week: 5 days     Minutes of Exercise per Session: 30 min   Stress: No Stress Concern Present  (5/6/2025)    [employer] - Occupational Stress Questionnaire     Feeling of Stress : Only a little   Housing Stability: Patient Declined (5/6/2025)    Housing Stability Vital Sign     Unable to Pay for Housing in the Last Year: Patient declined     Homeless in the Last Year: Patient declined       MEDICATIONS & ALLERGIES:     Medications Ordered Prior to Encounter[1]     Review of patient's allergies indicates:  No Known Allergies    Medications Reconciliation:   I have reconciled the patient's home medications and discharge medications with the patient/family. I have updated all changes.  Refer to After-Visit Medication List.    OBJECTIVE:     Vital Signs:  Vitals:    05/19/25 1049   BP: 118/78   Pulse: 62     Wt Readings from Last 3 Encounters:   05/19/25 1049 86.6 kg (191 lb)   05/12/25 1101 86.7 kg (191 lb 2.2 oz)   05/12/25 1042 86.7 kg (191 lb 2.2 oz)     Body mass index is 26.64 kg/m².   11/11/24 1617 87.9 kg (193 lb 12.6 oz)   09/17/24 0810 88.3 kg (194 lb 10.7 oz)   09/16/24 1040 88.1 kg (194 lb 3.6 oz)     Physical Exam:  General:  No distress.   HEENT: Head is normocephalic, atraumatic; ears are normal.    Eyes: Clear conjunctiva.  Neck: Supple, symmetrical neck; trachea midline.  Lungs: Clear to auscultation bilaterally and normal respiratory effort.  Cardiovascular: Heart with regular rate and rhythm.    Extremities: No LE edema. Pulses 2+ and symmetric.   Abdomen: Abdomen is soft, non-tender non-distended with normal bowel sounds.  Skin: Skin color, texture, turgor normal. No rashes.  Musculoskeletal: Normal gait.   Lymph Nodes: No cervical or supraclavicular adenopathy.  Neurologic: Normal strength and tone. No focal numbness or weakness.   Psychiatric: Normal affect. Alert.    Laboratory  Lab Results   Component Value Date    WBC 6.21 05/12/2025    HGB 12.9 (L) 05/12/2025    HCT 38.0 (L) 05/12/2025     05/12/2025    CHOL 145 11/11/2024    TRIG 107 11/11/2024     HDL 50 11/11/2024    ALT 38 05/12/2025    AST 25 05/12/2025     05/12/2025    K 4.0 05/12/2025     05/12/2025    CREATININE 1.2 05/12/2025    BUN 24 (H) 05/12/2025    CO2 22 (L) 05/12/2025    TSH 1.453 11/11/2024    PSA 0.02 05/12/2025    HGBA1C 8.2 (H) 05/12/2025       ASSESSMENT & PLAN:     Annual physical exam  - Reviewed and updated past and current medical problems.  Discussed treatment of current medical problems    -     Diabetic Eye Screening Photo - Automated Analysis; Future; Expected date: 05/19/2025    Type 2 diabetes mellitus without complication, without long-term current use of insulin  A1c 6.9 --> 7.5  --> 8.2  Plan: Increase metformin to 1000 mg BID. Add Jardiance.  Does not want injection    Rx:  -     metFORMIN (GLUCOPHAGE) 1000 MG tablet; Take 1 tablet (1,000 mg total) by mouth 2 (two) times daily with meals.  Dispense: 180 tablet; Refill: 3  -     empagliflozin (JARDIANCE) 10 mg tablet; Take 1 tablet (10 mg total) by mouth once daily.  Dispense: 90 tablet; Refill: 3  -     glimepiride (AMARYL) 2 MG tablet; Take 1 tablet (2 mg total) by mouth 2 (two) times daily with meals.  Dispense: 180 tablet; Refill: 3    Dyslipidemia   - Stable cholesterol on:   -     atorvastatin (LIPITOR) 20 MG tablet; Take 1 tablet (20 mg total) by mouth nightly.       Hypertension associated with type 2 diabetes mellitus   - BP under control on:  -     losartan (COZAAR) 25 MG tablet; Take 1 tablet (25 mg total) by mouth nightly.       Postablative hypothyroidism   - Stable on:  -     levothyroxine (SYNTHROID) 137 MCG Tab tablet; Take 1 tablet (137 mcg total) by mouth once daily.        Kidney stone  2-  - s/p Cystoureteroscopy with laser lithotripsy of ureteral stoen and stent insertion      2-29-24  - patient had residual stone adjacent to the stent and within the kidney on his imaging study.  I discussed options and recommended a second-look ureteroscopy.  Patient stated that he would prefer not to  "have another surgery at this time and would like his stent removed to give his stones chance to pass.  The stent was removed today and we will repeat imaging in 6 weeks.      Vitamin D deficiency     Change to Vitamin D 50,000 IU weekly.     Benign prostatic hyperplasia with urinary frequency  -     tamsulosin (FLOMAX) 0.4 mg Cap; Take 1 capsule (0.4 mg total) by mouth once daily.  -     finasteride (PROSCAR) 5 mg tablet; Take 1 tablet (5 mg total) by mouth once daily.       Prostate Cancer Stage IVB  Oncology 5-2025  - MRI prostate (12/11/23) revealed "  Diffusely abnormal appearance of the prostate highly concerning for malignancy (PI-RADS 5).  Suspected involvement of the bilateral seminal vesicles" as well as a suspicious right pelvic lymph node.  - PSA (12/27/23) was elevated at 13 ng/mL, 26 when corrected for finasteride  - biopsy of prostate (12/27/23) revealed Ursula 3+4=7 (Grade 2) prostate cancer in all biopsy sites.  - PSMA scan (1/4/24) revealed "biopsy-proven prostate malignancy with bladder invasion.  Tracer-avid lesions concerning for supraclavicular, thoracic, abdominopelvic, and osseous metastases."  - he began bicalutamide in January 2024.  - he underwent TURBT/TURP on 1/24/24.  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  - during trip to [place], he took bicalutamide, which is cheaper. He has not gone back on enzalutamide since the trip. He is currently taking bicalutamide. He would like to continue bicalutamide for now.  - Labs have been reviewed. Hemoglobin is 13.6 g/dL. PSA is 0.02 ng/mL  - okay to proceed with leuprolide today.  - recommend dental clearance in order to proceed with denosumab q12 weeks.    - return to clinic in August 2025 in preparation for next dose of Eligard.       Preventive Health Maintenance:     Screening for malignant neoplasm of colon  -     Ambulatory referral/consult to Endo Procedure " ; Future; Expected date: 05/20/2025     Return to Clinic for Follow Up with me:   Nov.    Scheduled Follow-up :  Future Appointments   Date Time Provider Department Center   5/20/2025  9:00 AM [facility] INTERNAL MEDICINE [facility]   5/27/2025 10:00 AM [facility] CT2 LIMIT 450 LBS [facility] CT SCAN [facility]   5/27/2025 12:00 PM [facility] PET CT1 LIMIT 500 LBS [facility] PET CT [facility]   6/2/2025  8:45 AM Reinaldo Ervin MD [facility] UROLOGY [facility]   8/11/2025  1:00 PM APPOINTMENT LAB, [facility] LAB [facility]   8/12/2025  1:20 PM Yasir Chi MD [facility] HEM ONC [facility]   8/12/2025  2:00 PM CHAIR 02 [facility] CHEMO [facility]   11/24/2025  9:00 AM Pawan Garza MD [facility]       After Visit Medication List :     Medication List            Accurate as of May 19, 2025 11:21 AM. If you have any questions, ask your nurse or doctor.                START taking these medications      empagliflozin 10 mg tablet  Commonly known as: JARDIANCE  Take 1 tablet (10 mg total) by mouth once daily.  Started by: Pawan Garza MD            CHANGE how you take these medications      metFORMIN 1000 MG tablet  Commonly known as: GLUCOPHAGE  Take 1 tablet (1,000 mg total) by mouth 2 (two) times daily with meals.  What changed:   medication strength  how much to take  when to take this  Changed by: Pawan Garza MD            CONTINUE taking these medications      atorvastatin 20 MG tablet  Commonly known as: LIPITOR  Take 1 tablet (20 mg total) by mouth nightly.     bicalutamide 50 MG Tab  Commonly known as: CASODEX  Take 1 tablet (50 mg total) by mouth once daily.     cholecalciferol (vitamin D3) 1,250 mcg (50,000 unit) capsule  Take 1 capsule (50,000 Units total) by mouth once a week.     finasteride 5 mg tablet  Commonly known as: PROSCAR  Take 1 tablet (5 mg total) by mouth once daily.     glimepiride 2 MG tablet  Commonly known as: AMARYL  Take 1 tablet (2 mg total) by mouth 2 (two)  times daily with meals.     levothyroxine 137 MCG Tab tablet  Commonly known as: SYNTHROID  Take 1 tablet (137 mcg total) by mouth once daily.     losartan 25 MG tablet  Commonly known as: COZAAR  Take 1 tablet (25 mg total) by mouth nightly.     tamsulosin 0.4 mg Cap  Commonly known as: FLOMAX  Take 1 capsule (0.4 mg total) by mouth once daily.               Where to Get Your Medications        These medications were sent to [id number] [address] [facility]  [address] [place] [address]      Phone: [phone]   empagliflozin 10 mg tablet  glimepiride 2 MG tablet  metFORMIN 1000 MG tablet         Signing Physician:  Pawan Garza MD         [1]   Current Outpatient Medications on File Prior to Visit   Medication Sig Dispense Refill    atorvastatin (LIPITOR) 20 MG tablet Take 1 tablet (20 mg total) by mouth nightly. 90 tablet 3    bicalutamide (CASODEX) 50 MG Tab Take 1 tablet (50 mg total) by mouth once daily. 180 tablet 1    cholecalciferol, vitamin D3, 1,250 mcg (50,000 unit) capsule Take 1 capsule (50,000 Units total) by mouth once a week. 12 capsule 3    finasteride (PROSCAR) 5 mg tablet Take 1 tablet (5 mg total) by mouth once daily. 90 tablet 3    levothyroxine (SYNTHROID) 137 MCG Tab tablet Take 1 tablet (137 mcg total) by mouth once daily. 90 tablet 3    losartan (COZAAR) 25 MG tablet Take 1 tablet (25 mg total) by mouth nightly. 90 tablet 3    tamsulosin (FLOMAX) 0.4 mg Cap Take 1 capsule (0.4 mg total) by mouth once daily. 90 capsule 3    [DISCONTINUED] glimepiride (AMARYL) 2 MG tablet Take 1 tablet (2 mg total) by mouth 2 (two) times daily with meals. 180 tablet 3    [DISCONTINUED] metFORMIN (GLUCOPHAGE) 500 MG tablet Take 1 tablet (500 mg total) by mouth 2 (two) times daily. 180 tablet 3     No current facility-administered medications on file prior to visit.

## 2025-05-21 ENCOUNTER — CLINICAL SUPPORT (OUTPATIENT)
Dept: ENDOSCOPY | Facility: HOSPITAL | Age: 65
End: 2025-05-21
Attending: INTERNAL MEDICINE
Payer: MEDICARE

## 2025-05-21 ENCOUNTER — CLINICAL SUPPORT (OUTPATIENT)
Dept: INTERNAL MEDICINE | Facility: CLINIC | Age: 65
End: 2025-05-21
Attending: INTERNAL MEDICINE
Payer: MEDICARE

## 2025-05-21 ENCOUNTER — TELEPHONE (OUTPATIENT)
Dept: INTERNAL MEDICINE | Facility: CLINIC | Age: 65
End: 2025-05-21

## 2025-05-21 DIAGNOSIS — Z12.11 SCREENING FOR MALIGNANT NEOPLASM OF COLON: ICD-10-CM

## 2025-05-21 DIAGNOSIS — Z12.11 SCREENING FOR MALIGNANT NEOPLASM OF COLON: Primary | ICD-10-CM

## 2025-05-21 DIAGNOSIS — E11.9 TYPE 2 DIABETES MELLITUS WITHOUT COMPLICATION, WITHOUT LONG-TERM CURRENT USE OF INSULIN: ICD-10-CM

## 2025-05-21 LAB — HM EYE EXAM: POSITIVE

## 2025-05-21 PROCEDURE — 92229 IMG RTA DETC/MNTR DS POC ALY: CPT | Mod: S$GLB,,, | Performed by: INTERNAL MEDICINE

## 2025-05-21 NOTE — PLAN OF CARE
Contacted the patient to schedule an endoscopy procedure(s) Colonoscopy . The patient did not answer the call and left a voice message requesting a call back. Used the language line ID:[id number]

## 2025-05-21 NOTE — TELEPHONE ENCOUNTER
Pt was in office today and was supposed to receive a call to get colonoscopy scheduled but missed the call. Attempted to reschedule however appointment desk stated a new order was needed. Pt requested new order be put in for Endo procedure.

## 2025-05-22 ENCOUNTER — TELEPHONE (OUTPATIENT)
Dept: ENDOSCOPY | Facility: HOSPITAL | Age: 65
End: 2025-05-22
Payer: MEDICARE

## 2025-05-22 ENCOUNTER — PATIENT MESSAGE (OUTPATIENT)
Dept: ENDOSCOPY | Facility: HOSPITAL | Age: 65
End: 2025-05-22

## 2025-05-22 NOTE — TELEPHONE ENCOUNTER
Contacted the patient with  #[phone] to schedule an endoscopy procedure(s) Colonoscopy . The patient did not answer the call and left a voice message requesting a call back. Letter mailed to home.             Endoscopy Scheduling Department  [facility] [address] [place] [address]  Take the [facility] Elevators to 4th Floor Endoscopy Lab      Date: 05/22/2025      Medical Record #       Dear  Francisco Marquez    An order for the following procedure(s) Colonoscopy was placed for you by Dr. Pawan Garza.    Since multiple attempts have been made to get in touch with you, this is the last notification. Please call the scheduling nurse to schedule this procedure as soon as possible.    If you have already scheduled this appointment, please disregard this letter. If you would like to cancel this request, please call the number listed below.     Sincerely,        Endoscopy Scheduling Department [phone]        Comments: Office hours are Monday through Friday 8-430p.

## 2025-05-27 ENCOUNTER — HOSPITAL ENCOUNTER (OUTPATIENT)
Dept: RADIOLOGY | Facility: HOSPITAL | Age: 65
Discharge: HOME | End: 2025-05-27
Attending: UROLOGY
Payer: MEDICARE

## 2025-05-27 DIAGNOSIS — C61 PROSTATE CANCER: ICD-10-CM

## 2025-05-27 DIAGNOSIS — N20.1 LEFT URETERAL STONE: ICD-10-CM

## 2025-05-27 DIAGNOSIS — R10.30 LOWER ABDOMINAL PAIN: ICD-10-CM

## 2025-05-27 PROCEDURE — 74176 CT ABD & PELVIS W/O CONTRAST: CPT | Mod: TC

## 2025-05-27 PROCEDURE — 74176 CT ABD & PELVIS W/O CONTRAST: CPT | Mod: 26,,, | Performed by: RADIOLOGY

## 2025-05-27 PROCEDURE — 78815 PET IMAGE W/CT SKULL-THIGH: CPT | Mod: 26,PS,, | Performed by: NUCLEAR MEDICINE

## 2025-05-27 PROCEDURE — 78815 PET IMAGE W/CT SKULL-THIGH: CPT | Mod: TC

## 2025-05-27 PROCEDURE — A9595 HC PIFLUFOLASTAT F-18, DX, PER 1 MCI: HCPCS | Mod: TB | Performed by: UROLOGY

## 2025-05-27 RX ADMIN — PIFLUFOLASTAT F-18 9.87 MILLICURIE: 80 INJECTION INTRAVENOUS at 12:05

## 2025-06-02 ENCOUNTER — PATIENT MESSAGE (OUTPATIENT)
Dept: ENDOSCOPY | Facility: HOSPITAL | Age: 65
End: 2025-06-02

## 2025-06-02 ENCOUNTER — TELEPHONE (OUTPATIENT)
Dept: ENDOSCOPY | Facility: HOSPITAL | Age: 65
End: 2025-06-02

## 2025-06-02 ENCOUNTER — OFFICE VISIT (OUTPATIENT)
Dept: UROLOGY | Facility: CLINIC | Age: 65
End: 2025-06-02
Payer: MEDICARE

## 2025-06-02 VITALS
SYSTOLIC BLOOD PRESSURE: 116 MMHG | BODY MASS INDEX: 26.98 KG/M2 | DIASTOLIC BLOOD PRESSURE: 76 MMHG | WEIGHT: 192.69 LBS | HEIGHT: 71 IN | HEART RATE: 67 BPM

## 2025-06-02 DIAGNOSIS — C61 PROSTATE CANCER: Primary | ICD-10-CM

## 2025-06-02 DIAGNOSIS — Z12.11 SPECIAL SCREENING FOR MALIGNANT NEOPLASMS, COLON: Primary | ICD-10-CM

## 2025-06-02 DIAGNOSIS — N40.1 BENIGN PROSTATIC HYPERPLASIA WITH URINARY FREQUENCY: ICD-10-CM

## 2025-06-02 DIAGNOSIS — C79.51 PROSTATE CANCER METASTATIC TO BONE: ICD-10-CM

## 2025-06-02 DIAGNOSIS — C61 PROSTATE CANCER METASTATIC TO BONE: ICD-10-CM

## 2025-06-02 DIAGNOSIS — N20.1 LEFT URETERAL STONE: ICD-10-CM

## 2025-06-02 DIAGNOSIS — R35.0 BENIGN PROSTATIC HYPERPLASIA WITH URINARY FREQUENCY: ICD-10-CM

## 2025-06-02 DIAGNOSIS — N22 CALCULUS OF URINARY TRACT IN DISEASES CLASSIFIED ELSEWHERE: ICD-10-CM

## 2025-06-02 DIAGNOSIS — N40.1 BPH WITH OBSTRUCTION/LOWER URINARY TRACT SYMPTOMS: ICD-10-CM

## 2025-06-02 DIAGNOSIS — N13.8 BPH WITH OBSTRUCTION/LOWER URINARY TRACT SYMPTOMS: ICD-10-CM

## 2025-06-02 DIAGNOSIS — Z12.11 SCREENING FOR MALIGNANT NEOPLASM OF COLON: ICD-10-CM

## 2025-06-02 PROCEDURE — 99999 PR PBB SHADOW E&M-EST. PATIENT-LVL IV: CPT | Mod: PBBFAC,,, | Performed by: UROLOGY

## 2025-06-02 PROCEDURE — 1159F MED LIST DOCD IN RCRD: CPT | Mod: CPTII,S$GLB,, | Performed by: UROLOGY

## 2025-06-02 PROCEDURE — 1126F AMNT PAIN NOTED NONE PRSNT: CPT | Mod: CPTII,S$GLB,, | Performed by: UROLOGY

## 2025-06-02 PROCEDURE — G2211 COMPLEX E/M VISIT ADD ON: HCPCS | Mod: S$GLB,,, | Performed by: UROLOGY

## 2025-06-02 PROCEDURE — 3288F FALL RISK ASSESSMENT DOCD: CPT | Mod: CPTII,S$GLB,, | Performed by: UROLOGY

## 2025-06-02 PROCEDURE — 3008F BODY MASS INDEX DOCD: CPT | Mod: CPTII,S$GLB,, | Performed by: UROLOGY

## 2025-06-02 PROCEDURE — 4010F ACE/ARB THERAPY RXD/TAKEN: CPT | Mod: CPTII,S$GLB,, | Performed by: UROLOGY

## 2025-06-02 PROCEDURE — 1157F ADVNC CARE PLAN IN RCRD: CPT | Mod: CPTII,S$GLB,, | Performed by: UROLOGY

## 2025-06-02 PROCEDURE — 3052F HG A1C>EQUAL 8.0%<EQUAL 9.0%: CPT | Mod: CPTII,S$GLB,, | Performed by: UROLOGY

## 2025-06-02 PROCEDURE — 1101F PT FALLS ASSESS-DOCD LE1/YR: CPT | Mod: CPTII,S$GLB,, | Performed by: UROLOGY

## 2025-06-02 PROCEDURE — 99215 OFFICE O/P EST HI 40 MIN: CPT | Mod: S$GLB,,, | Performed by: UROLOGY

## 2025-06-02 PROCEDURE — 1160F RVW MEDS BY RX/DR IN RCRD: CPT | Mod: CPTII,S$GLB,, | Performed by: UROLOGY

## 2025-06-02 PROCEDURE — 3078F DIAST BP <80 MM HG: CPT | Mod: CPTII,S$GLB,, | Performed by: UROLOGY

## 2025-06-02 PROCEDURE — 3074F SYST BP LT 130 MM HG: CPT | Mod: CPTII,S$GLB,, | Performed by: UROLOGY

## 2025-06-02 RX ORDER — FINASTERIDE 5 MG/1
5 TABLET, FILM COATED ORAL DAILY
Qty: 90 TABLET | Refills: 3 | Status: SHIPPED | OUTPATIENT
Start: 2025-06-02 | End: 2026-06-02

## 2025-06-02 RX ORDER — TAMSULOSIN HYDROCHLORIDE 0.4 MG/1
1 CAPSULE ORAL DAILY
Qty: 90 CAPSULE | Refills: 3 | Status: SHIPPED | OUTPATIENT
Start: 2025-06-02

## 2025-06-03 ENCOUNTER — CLINICAL SUPPORT (OUTPATIENT)
Dept: OPHTHALMOLOGY | Facility: CLINIC | Age: 65
End: 2025-06-03
Payer: MEDICARE

## 2025-06-03 ENCOUNTER — OFFICE VISIT (OUTPATIENT)
Dept: OPHTHALMOLOGY | Facility: CLINIC | Age: 65
End: 2025-06-03
Payer: MEDICARE

## 2025-06-03 DIAGNOSIS — E11.9 DIABETES MELLITUS TYPE 2 WITHOUT RETINOPATHY: Primary | ICD-10-CM

## 2025-06-03 DIAGNOSIS — H43.813 VITREOUS DEGENERATION OF BOTH EYES: ICD-10-CM

## 2025-06-03 DIAGNOSIS — H25.13 AGE-RELATED NUCLEAR CATARACT OF BOTH EYES: ICD-10-CM

## 2025-06-03 PROCEDURE — 92134 CPTRZ OPH DX IMG PST SGM RTA: CPT | Mod: S$GLB,,, | Performed by: OPHTHALMOLOGY

## 2025-06-03 PROCEDURE — 1126F AMNT PAIN NOTED NONE PRSNT: CPT | Mod: CPTII,S$GLB,, | Performed by: OPHTHALMOLOGY

## 2025-06-03 PROCEDURE — 92202 OPSCPY EXTND ON/MAC DRAW: CPT | Mod: S$GLB,,, | Performed by: OPHTHALMOLOGY

## 2025-06-03 PROCEDURE — 99999 PR PBB SHADOW E&M-EST. PATIENT-LVL III: CPT | Mod: PBBFAC,,, | Performed by: OPHTHALMOLOGY

## 2025-06-03 PROCEDURE — 4010F ACE/ARB THERAPY RXD/TAKEN: CPT | Mod: CPTII,S$GLB,, | Performed by: OPHTHALMOLOGY

## 2025-06-03 PROCEDURE — 3288F FALL RISK ASSESSMENT DOCD: CPT | Mod: CPTII,S$GLB,, | Performed by: OPHTHALMOLOGY

## 2025-06-03 PROCEDURE — 1157F ADVNC CARE PLAN IN RCRD: CPT | Mod: CPTII,S$GLB,, | Performed by: OPHTHALMOLOGY

## 2025-06-03 PROCEDURE — 1159F MED LIST DOCD IN RCRD: CPT | Mod: CPTII,S$GLB,, | Performed by: OPHTHALMOLOGY

## 2025-06-03 PROCEDURE — 1101F PT FALLS ASSESS-DOCD LE1/YR: CPT | Mod: CPTII,S$GLB,, | Performed by: OPHTHALMOLOGY

## 2025-06-03 PROCEDURE — 2023F DILAT RTA XM W/O RTNOPTHY: CPT | Mod: CPTII,S$GLB,, | Performed by: OPHTHALMOLOGY

## 2025-06-03 PROCEDURE — 92004 COMPRE OPH EXAM NEW PT 1/>: CPT | Mod: S$GLB,,, | Performed by: OPHTHALMOLOGY

## 2025-06-03 PROCEDURE — 1160F RVW MEDS BY RX/DR IN RCRD: CPT | Mod: CPTII,S$GLB,, | Performed by: OPHTHALMOLOGY

## 2025-06-03 PROCEDURE — 3052F HG A1C>EQUAL 8.0%<EQUAL 9.0%: CPT | Mod: CPTII,S$GLB,, | Performed by: OPHTHALMOLOGY

## 2025-07-15 ENCOUNTER — TELEPHONE (OUTPATIENT)
Dept: ENDOSCOPY | Facility: HOSPITAL | Age: 65
End: 2025-07-15
Payer: MEDICARE

## 2025-07-15 NOTE — TELEPHONE ENCOUNTER
Copied from CRM #[id number]. Topic: General Inquiry - Patient Advice  >> Jul 14, 2025 11:57 AM Rosangela wrote:  .Type:  Needs Medical Advice    Who Called: pt    Would the patient rather a call back or a response via [facility]? Call back   Best Call Back Number:   Additional Information:      Pt stated he would like a call back to get the prep information for his procedure on 7/28  >> Jul 14, 2025  1:52 PM Med Assistant Rosa wrote:    ----- Message -----  From: Rosangela Carey  Sent: 7/14/2025  11:59 AM CDT  To: Paige Lenz

## 2025-07-22 ENCOUNTER — TELEPHONE (OUTPATIENT)
Dept: ENDOSCOPY | Facility: HOSPITAL | Age: 65
End: 2025-07-22
Payer: MEDICARE

## 2025-07-24 ENCOUNTER — TELEPHONE (OUTPATIENT)
Dept: ENDOSCOPY | Facility: HOSPITAL | Age: 65
End: 2025-07-24
Payer: MEDICARE

## 2025-07-24 NOTE — TELEPHONE ENCOUNTER
Spoke with patient about arrival time @ 1030.   Colonoscopy confirmed, reminded to hold Jardiance for next 3 days. Verbalized understanding of prep instructions. No questions at this time.

## 2025-07-28 ENCOUNTER — HOSPITAL ENCOUNTER (OUTPATIENT)
Facility: HOSPITAL | Age: 65
Discharge: HOME | End: 2025-07-28
Attending: INTERNAL MEDICINE | Admitting: INTERNAL MEDICINE
Payer: MEDICARE

## 2025-07-28 ENCOUNTER — ANESTHESIA (OUTPATIENT)
Dept: ENDOSCOPY | Facility: HOSPITAL | Age: 65
End: 2025-07-28
Payer: MEDICARE

## 2025-07-28 ENCOUNTER — ANESTHESIA EVENT (OUTPATIENT)
Dept: ENDOSCOPY | Facility: HOSPITAL | Age: 65
End: 2025-07-28
Payer: MEDICARE

## 2025-07-28 VITALS
SYSTOLIC BLOOD PRESSURE: 123 MMHG | RESPIRATION RATE: 18 BRPM | OXYGEN SATURATION: 97 % | TEMPERATURE: 98 F | BODY MASS INDEX: 26.6 KG/M2 | DIASTOLIC BLOOD PRESSURE: 75 MMHG | WEIGHT: 190 LBS | HEIGHT: 71 IN | HEART RATE: 49 BPM

## 2025-07-28 DIAGNOSIS — Z12.11 SCREENING FOR MALIGNANT NEOPLASM OF COLON: ICD-10-CM

## 2025-07-28 PROBLEM — H43.813 VITREOUS DEGENERATION OF BOTH EYES: Status: RESOLVED | Noted: 2025-06-03 | Resolved: 2025-07-28

## 2025-07-28 PROBLEM — K57.90 DIVERTICULOSIS: Status: ACTIVE | Noted: 2025-07-28

## 2025-07-28 PROBLEM — Z86.0100 HISTORY OF COLON POLYPS: Status: ACTIVE | Noted: 2025-07-28

## 2025-07-28 PROBLEM — Z86.0100 HISTORY OF COLON POLYPS: Status: RESOLVED | Noted: 2025-07-28 | Resolved: 2025-07-28

## 2025-07-28 LAB — POCT GLUCOSE: 151 MG/DL (ref 70–110)

## 2025-07-28 PROCEDURE — 27201089 HC SNARE, DISP (ANY): Performed by: INTERNAL MEDICINE

## 2025-07-28 PROCEDURE — 88305 TISSUE EXAM BY PATHOLOGIST: CPT | Mod: TC | Performed by: INTERNAL MEDICINE

## 2025-07-28 PROCEDURE — 25000003 PHARM REV CODE 250

## 2025-07-28 PROCEDURE — 45385 COLONOSCOPY W/LESION REMOVAL: CPT | Mod: PT,,, | Performed by: INTERNAL MEDICINE

## 2025-07-28 PROCEDURE — 82962 GLUCOSE BLOOD TEST: CPT | Performed by: INTERNAL MEDICINE

## 2025-07-28 PROCEDURE — 63600175 PHARM REV CODE 636 W HCPCS

## 2025-07-28 PROCEDURE — 37000009 HC ANESTHESIA EA ADD 15 MINS: Performed by: INTERNAL MEDICINE

## 2025-07-28 PROCEDURE — 45385 COLONOSCOPY W/LESION REMOVAL: CPT | Mod: PT | Performed by: INTERNAL MEDICINE

## 2025-07-28 PROCEDURE — 37000008 HC ANESTHESIA 1ST 15 MINUTES: Performed by: INTERNAL MEDICINE

## 2025-07-28 RX ORDER — DEXMEDETOMIDINE HYDROCHLORIDE 100 UG/ML
INJECTION, SOLUTION INTRAVENOUS
Status: DISCONTINUED | OUTPATIENT
Start: 2025-07-28 | End: 2025-07-28

## 2025-07-28 RX ORDER — PROPOFOL 10 MG/ML
VIAL (ML) INTRAVENOUS CONTINUOUS PRN
Status: DISCONTINUED | OUTPATIENT
Start: 2025-07-28 | End: 2025-07-28

## 2025-07-28 RX ORDER — SODIUM CHLORIDE 9 MG/ML
INJECTION, SOLUTION INTRAVENOUS CONTINUOUS
Status: DISCONTINUED | OUTPATIENT
Start: 2025-07-28 | End: 2025-07-28 | Stop reason: HOSPADM

## 2025-07-28 RX ORDER — PROPOFOL 10 MG/ML
VIAL (ML) INTRAVENOUS
Status: DISCONTINUED | OUTPATIENT
Start: 2025-07-28 | End: 2025-07-28

## 2025-07-28 RX ADMIN — PROPOFOL 100 MCG/KG/MIN: 10 INJECTION, EMULSION INTRAVENOUS at 11:07

## 2025-07-28 RX ADMIN — DEXMEDETOMIDINE HYDROCHLORIDE 8 MCG: 100 INJECTION, SOLUTION, CONCENTRATE INTRAVENOUS at 11:07

## 2025-07-28 RX ADMIN — SODIUM CHLORIDE: 0.9 INJECTION, SOLUTION INTRAVENOUS at 11:07

## 2025-07-28 RX ADMIN — PROPOFOL 50 MG: 10 INJECTION, EMULSION INTRAVENOUS at 11:07

## 2025-07-28 NOTE — H&P
Short Stay Endoscopy History and Physical    PCP - Pawan Garza MD    Procedure - Colonoscopy  ASA - II  Mallampati - per anesthesia  History of Anesthesia problems - no  Family history Anesthesia problems - no     HPI:  This is a 64 y.o. male here for evaluation of : Colon cancer screening    Family history of colon cancer - no  History of polyps - na  Change in bowel habits - no  Rectal bleeding - no      ROS:  Constitutional: No fevers, chills, No weight loss  ENT: No allergies  CV: No chest pain  Pulm: No shortness of breath  GI: see HPI  Derm: No rash    Medical History:  has a past medical history of Age-related nuclear cataract of both eyes (06/03/2025), Dyslipidemia (02/25/2013), History of colon polyps (07/28/2025), Hypertension associated with type 2 diabetes mellitus (12/19/2023), Kidney stone (12/19/2023), Postablative hypothyroidism (02/25/2013), Prostate cancer (01/16/2024), Type 2 diabetes mellitus without complication, without long-term current use of insulin (03/14/2011), Vitamin D deficiency (02/25/2013), and Vitreous degeneration of both eyes (06/03/2025).    Surgical History:  has a past surgical history that includes Transurethral resection of prostate (N/A, 01/24/2024); Cystoscopy with ureteroscopy, retrograde pyelography, and insertion of stent (Left, 01/24/2024); cystoureteroscopy, with holmium laser lithotripsy of ureteral calculus and stent insertion (Left, 02/21/2024); extraction - stone (Left, 02/21/2024); and Cystoscopy with ureteroscopy, retrograde pyelography, and insertion of stent (Left, 02/21/2024).    Family History: family history includes Breast cancer in his sister.. Otherwise no colon cancer, inflammatory bowel disease, or GI malignancies.    Social History:  reports that he has been smoking cigarettes. He has a 17.5 pack-year smoking history. He has never used smokeless tobacco. He reports that he does not drink alcohol and does not use drugs.    Review of patient's  allergies indicates:  No Known Allergies    Medications:   Prescriptions Prior to Admission[1]      Objective Findings:    Vital Signs: see nursing notes  Physical Exam:  General Appearance: Well appearing in no acute distress  Eyes:    No scleral icterus  ENT: Neck supple  Lungs: CTA anteriorly  Heart:  S1, S2 normal, no murmurs heard  Abdomen: Soft, non tender, non distended with positive bowel sounds. No hepatosplenomegaly, ascites, or mass  Extremities: no edema  Skin: No rash      Labs:  Lab Results   Component Value Date    WBC 6.21 05/12/2025    HGB 12.9 (L) 05/12/2025    HCT 38.0 (L) 05/12/2025     05/12/2025    CHOL 145 11/11/2024    TRIG 107 11/11/2024    HDL 50 11/11/2024    ALT 38 05/12/2025    AST 25 05/12/2025     05/12/2025    K 4.0 05/12/2025     05/12/2025    CREATININE 1.2 05/12/2025    BUN 24 (H) 05/12/2025    CO2 22 (L) 05/12/2025    TSH 1.453 11/11/2024    PSA 0.02 05/12/2025    HGBA1C 8.2 (H) 05/12/2025       I have explained the risks and benefits of endoscopy procedures to the patient including but not limited to bleeding, perforation, infection, and death.    Adam Gibson MD         [1]   Medications Prior to Admission   Medication Sig Dispense Refill Last Dose/Taking    atorvastatin (LIPITOR) 20 MG tablet Take 1 tablet (20 mg total) by mouth nightly. 90 tablet 3 7/27/2025    bicalutamide (CASODEX) 50 MG Tab Take 1 tablet (50 mg total) by mouth once daily. 180 tablet 1 7/27/2025    cholecalciferol, vitamin D3, 1,250 mcg (50,000 unit) capsule Take 1 capsule (50,000 Units total) by mouth once a week. 12 capsule 3 7/27/2025    finasteride (PROSCAR) 5 mg tablet Take 1 tablet (5 mg total) by mouth once daily. 90 tablet 3 7/27/2025    levothyroxine (SYNTHROID) 137 MCG Tab tablet Take 1 tablet (137 mcg total) by mouth once daily. 90 tablet 3 7/27/2025    losartan (COZAAR) 25 MG tablet Take 1 tablet (25 mg total) by mouth nightly. 90 tablet 3 7/27/2025    tamsulosin  (FLOMAX) 0.4 mg Cap Take 1 capsule (0.4 mg total) by mouth once daily. 90 capsule 3 7/27/2025    empagliflozin (JARDIANCE) 10 mg tablet Take 1 tablet (10 mg total) by mouth once daily. 90 tablet 3 7/24/2025    glimepiride (AMARYL) 2 MG tablet Take 1 tablet (2 mg total) by mouth 2 (two) times daily with meals. 180 tablet 3 7/26/2025    metFORMIN (GLUCOPHAGE) 1000 MG tablet Take 1 tablet (1,000 mg total) by mouth 2 (two) times daily with meals. 180 tablet 3 7/26/2025

## 2025-07-28 NOTE — ANESTHESIA POSTPROCEDURE EVALUATION
Anesthesia Post Evaluation    Patient: Waldemar Marquez    Procedure(s) Performed: Procedure(s) (LRB):  COLONOSCOPY, SCREENING, LOW RISK PATIENT (N/A)    Final Anesthesia Type: general      Patient location during evaluation: GI PACU  Patient participation: Yes- Able to Participate  Level of consciousness: awake and alert, oriented and awake  Post-procedure vital signs: reviewed and stable  Pain management: adequate  Airway patency: patent    PONV status at discharge: No PONV  Anesthetic complications: no      Cardiovascular status: stable  Respiratory status: unassisted and room air  Hydration status: euvolemic  Follow-up not needed.              Vitals Value Taken Time   /75 07/28/25 12:10   Temp 36.7 °C (98.1 °F) 07/28/25 11:40   Pulse 49 07/28/25 12:10   Resp 18 07/28/25 12:10   SpO2 97 % 07/28/25 12:10         No case tracking events are documented in the log.      Pain/Ian Score: Ian Score: 10 (7/28/2025 12:10 PM)

## 2025-07-28 NOTE — [employer] PATIENT INSTRUCTIONS
Discharge Summary/Instructions after an Endoscopic Procedure  Patient Name: Waldemar Marquez  Patient MRN: [id number]  Patient YOB: 1960 Monday, July 28, 2025  Adam Gibson MD  Dear patient,  As a result of recent federal legislation (The Federal Cures Act), you may   receive lab or pathology results from your procedure in your [facility]   account before your physician is able to contact you. Your physician or   their representative will relay the results to you with their   recommendations at their soonest availability.  Thank you,  Your health is very important to us during the Covid Crisis. Following your   procedure today, you will receive a daily text for 2 weeks asking about   signs or symptoms of Covid 19.  Please respond to this text when you   receive it so we can follow up and keep you as safe as possible.   RESTRICTIONS:  During your procedure today, you received medications for sedation.  These   medications may affect your judgment, balance and coordination.  Therefore,   for 24 hours, you have the following restrictions:   - DO NOT drive a car, operate machinery, make legal/financial decisions,   sign important papers or drink alcohol.    ACTIVITY:  Today: no heavy lifting, straining or running due to procedural   sedation/anesthesia.  The following day: return to full activity including work.  DIET:  Eat and drink normally unless instructed otherwise.     TREATMENT FOR COMMON SIDE EFFECTS:  - Mild abdominal pain, nausea, belching, bloating or excessive gas:  rest,   eat lightly and use a heating pad.  - Sore Throat: treat with throat lozenges and/or gargle with warm salt   water.  - Because air was used during the procedure, expelling large amounts of air   from your rectum or belching is normal.  - If a bowel prep was taken, you may not have a bowel movement for 1-3 days.    This is normal.  SYMPTOMS TO WATCH FOR AND REPORT TO YOUR PHYSICIAN:  1. Abdominal pain or bloating, other  than gas cramps.  2. Chest pain.  3. Back pain.  4. Signs of infection such as: chills or fever occurring within 24 hours   after the procedure.  5. Rectal bleeding, which would show as bright red, maroon, or black stools.   (A tablespoon of blood from the rectum is not serious, especially if   hemorrhoids are present.)  6. Vomiting.  7. Weakness or dizziness.  GO DIRECTLY TO THE NEAREST EMERGENCY ROOM IF YOU HAVE ANY OF THE FOLLOWING:      Difficulty breathing              Chills and/or fever over 101 F   Persistent vomiting and/or vomiting blood   Severe abdominal pain   Severe chest pain   Black, tarry stools   Bleeding- more than one tablespoon   Any other symptom or condition that you feel may need urgent attention  Your doctor recommends these additional instructions:  If any biopsies were taken, your doctors clinic will contact you in 1 to 2   weeks with any results.  - Discharge patient to home (ambulatory).   - Patient has a contact number available for emergencies.  The signs and   symptoms of potential delayed complications were discussed with the   patient.  Return to normal activities tomorrow.  Written discharge   instructions were provided to the patient.   - Resume previous diet.   - Continue present medications.   - Return to primary care physician as previously scheduled.   - Repeat colonoscopy in 5 years for surveillance.  For questions, problems or results please call your physician - Adam Gibson MD.  EMERGENCY PHONE NUMBER: [phone],  LAB RESULTS: [phone]  IF A COMPLICATION OR EMERGENCY SITUATION ARISES AND YOU ARE UNABLE TO REACH   YOUR PHYSICIAN - GO DIRECTLY TO THE EMERGENCY ROOM.  Adam Gibson MD  7/28/2025 11:40:32 AM  This report has been verified and signed electronically.  Dear patient,  As a result of recent federal legislation (The Federal Cures Act), you may   receive lab or pathology results from your procedure in your [facility]   account  before your physician is able to contact you. Your physician or   their representative will relay the results to you with their   recommendations at their soonest availability.  Thank you,  [employer]

## 2025-07-28 NOTE — PLAN OF CARE
Admission process complete. Patient ready for procedure. Plan of care reviewed with patient. Preoperative fasting appropriate for procedure and sedation. Call light in reach and bed in lowest position.   Past Medical History:   Diagnosis Date    Dyslipidemia 02/25/2013    Hypertension associated with type 2 diabetes mellitus 12/19/2023    Kidney stone 12/19/2023    Postablative hypothyroidism 02/25/2013    radioactive ablation    Prostate cancer 01/16/2024    Type 2 diabetes mellitus without complication, without long-term current use of insulin 03/14/2011    Vitamin D deficiency 02/25/2013

## 2025-07-28 NOTE — TRANSFER OF CARE
"Anesthesia Transfer of Care Note    Patient: Waldemar Marquez    Procedure(s) Performed: Procedure(s) (LRB):  COLONOSCOPY, SCREENING, LOW RISK PATIENT (N/A)    Patient location: GI    Anesthesia Type: general    Transport from OR: Transported from OR on room air with adequate spontaneous ventilation    Post pain: adequate analgesia    Post assessment: no apparent anesthetic complications and tolerated procedure well    Post vital signs: stable    Level of consciousness: awake    Nausea/Vomiting: no nausea/vomiting    Complications: none    Transfer of care protocol was followed      Last vitals: Visit Vitals  /81 (BP Location: Left arm, Patient Position: Lying)   Pulse (!) 57   Temp 36.4 °C (97.5 °F) (Temporal)   Resp 20   Ht 5' 11" (1.803 m)   Wt 86.2 kg (190 lb)   SpO2 96%   BMI 26.50 kg/m²     "

## 2025-07-28 NOTE — ANESTHESIA PREPROCEDURE EVALUATION
07/28/2025  Waldemar Marquez is a 64 y.o., male here for a colonoscopy.       Pre-op Assessment    I have reviewed the Patient Summary Reports.    I have reviewed the NPO Status.   I have reviewed the Medications.     Review of Systems  Anesthesia Hx:  No problems with previous Anesthesia               Denies Personal Hx of Anesthesia complications.                    Social:  Smoker       Cardiovascular:     Hypertension           hyperlipidemia                               Renal/:   renal calculi               Endocrine:  Diabetes Hypothyroidism              Physical Exam  General: Well nourished, Cooperative, Alert and Oriented    Airway:  Mallampati: II   Mouth Opening: Normal  TM Distance: Normal  Tongue: Normal  Neck ROM: Normal ROM    Chest/Lungs:  Clear to auscultation, Normal Respiratory Rate    Heart:  Rate: Normal  Rhythm: Regular Rhythm        Anesthesia Plan  Type of Anesthesia, risks & benefits discussed:    Anesthesia Type: Gen Natural Airway  Intra-op Monitoring Plan: Standard ASA Monitors  Post Op Pain Control Plan: multimodal analgesia  Induction:  IV  Informed Consent: Informed consent signed with the Patient and all parties understand the risks and agree with anesthesia plan.  All questions answered.   ASA Score: 2    Ready For Surgery From Anesthesia Perspective.     .

## 2025-07-29 LAB
ESTROGEN SERPL-MCNC: NORMAL PG/ML
INSULIN SERPL-ACNC: NORMAL U[IU]/ML
LAB AP CLINICAL INFORMATION: NORMAL
LAB AP GROSS DESCRIPTION: NORMAL
LAB AP PERFORMING LOCATION(S): NORMAL
LAB AP REPORT FOOTNOTES: NORMAL

## 2025-08-04 ENCOUNTER — TELEPHONE (OUTPATIENT)
Dept: HEMATOLOGY/ONCOLOGY | Facility: CLINIC | Age: 65
End: 2025-08-04
Payer: MEDICARE

## 2025-08-04 NOTE — TELEPHONE ENCOUNTER
Pt rescheduled for 8/18        Copied from CRM #[id number]. Topic: Appointments - Appointment Access  >> Aug 4, 2025  9:13 AM Janice wrote:  Pt Requesting to Reschedule an Appointment     Pt is requesting to Reschedule an appointment that our scheduling dept cannot Reschedule.    Who called: pt  Initial appt date: 8/12/25  When pt wants appt: 8/19/25  Reason: pt didn't say  Best call back #: [phone]  Additional notes: pt said this is for both his doctor's and infusion appt  >> Aug 4, 2025  9:32 AM SONIDO Glaser wrote:  I rescheduled to 8/18 at 4p  ----- Message -----  From: Renetta Iglesias RN  Sent: 8/4/2025   9:27 AM CDT  To: Jailyn Edge RN    John can we reschedule his 8/12 infusion to either 8/18, 8/19/, 8/20. Those are the days he said he can come  ----- Message -----  From: Yasir Chi MD  Sent: 8/4/2025   9:22 AM CDT  To: Renetta Iglesias RN      ----- Message -----  From: Janice White  Sent: 8/4/2025   9:14 AM CDT  To: Alon Lenz    >> Aug 4, 2025  9:27 AM SONIDO Jackson wrote:  John can we reschedule his 8/12 infusion to either 8/18, 8/19/, 8/20. Those are the days he said he can come  ----- Message -----  From: Yasir Chi MD  Sent: 8/4/2025   9:22 AM CDT  To: Renetta Iglesias RN      ----- Message -----  From: Janice White  Sent: 8/4/2025   9:14 AM CDT  To: Alon Lenz    >> Aug 4, 2025  9:22 AM Yasir Chi MD wrote:    ----- Message -----  From: Janice White  Sent: 8/4/2025   9:14 AM CDT  To: Alon Lenz

## 2025-08-18 ENCOUNTER — LAB VISIT (OUTPATIENT)
Dept: LAB | Facility: HOSPITAL | Age: 65
End: 2025-08-18
Attending: INTERNAL MEDICINE
Payer: MEDICARE

## 2025-08-18 ENCOUNTER — OFFICE VISIT (OUTPATIENT)
Dept: HEMATOLOGY/ONCOLOGY | Facility: CLINIC | Age: 65
End: 2025-08-18
Payer: MEDICARE

## 2025-08-18 ENCOUNTER — INFUSION (OUTPATIENT)
Dept: INFUSION THERAPY | Facility: HOSPITAL | Age: 65
End: 2025-08-18
Attending: INTERNAL MEDICINE
Payer: MEDICARE

## 2025-08-18 VITALS
RESPIRATION RATE: 18 BRPM | TEMPERATURE: 98 F | SYSTOLIC BLOOD PRESSURE: 106 MMHG | DIASTOLIC BLOOD PRESSURE: 59 MMHG | OXYGEN SATURATION: 95 % | BODY MASS INDEX: 27.2 KG/M2 | WEIGHT: 195 LBS | HEART RATE: 73 BPM

## 2025-08-18 DIAGNOSIS — E11.9 TYPE 2 DIABETES MELLITUS WITHOUT COMPLICATION, WITHOUT LONG-TERM CURRENT USE OF INSULIN: ICD-10-CM

## 2025-08-18 DIAGNOSIS — E11.59 HYPERTENSION ASSOCIATED WITH TYPE 2 DIABETES MELLITUS: ICD-10-CM

## 2025-08-18 DIAGNOSIS — C61 PROSTATE CANCER: Primary | ICD-10-CM

## 2025-08-18 DIAGNOSIS — I15.2 HYPERTENSION ASSOCIATED WITH TYPE 2 DIABETES MELLITUS: ICD-10-CM

## 2025-08-18 DIAGNOSIS — C79.51 SECONDARY MALIGNANT NEOPLASM OF BONE: ICD-10-CM

## 2025-08-18 DIAGNOSIS — D63.0 ANEMIA IN NEOPLASTIC DISEASE: ICD-10-CM

## 2025-08-18 DIAGNOSIS — C77.8 SECONDARY AND MALIGNANT NEOPLASM OF LYMPH NODES OF MULTIPLE SITES: ICD-10-CM

## 2025-08-18 DIAGNOSIS — C61 PROSTATE CANCER: ICD-10-CM

## 2025-08-18 LAB
ABSOLUTE EOSINOPHIL (OHS): 0.39 K/UL
ABSOLUTE MONOCYTE (OHS): 0.71 K/UL (ref 0.3–1)
ABSOLUTE NEUTROPHIL COUNT (OHS): 3.28 K/UL (ref 1.8–7.7)
ALBUMIN SERPL BCP-MCNC: 4 G/DL (ref 3.5–5.2)
ALP SERPL-CCNC: 58 UNIT/L (ref 40–150)
ALT SERPL W/O P-5'-P-CCNC: 28 UNIT/L (ref 10–44)
ANION GAP (OHS): 9 MMOL/L (ref 8–16)
AST SERPL-CCNC: 30 UNIT/L (ref 11–45)
BASOPHILS # BLD AUTO: 0.05 K/UL
BASOPHILS NFR BLD AUTO: 0.7 %
BILIRUB SERPL-MCNC: 0.5 MG/DL (ref 0.1–1)
BUN SERPL-MCNC: 25 MG/DL (ref 8–23)
CALCIUM SERPL-MCNC: 9.8 MG/DL (ref 8.7–10.5)
CHLORIDE SERPL-SCNC: 108 MMOL/L (ref 95–110)
CO2 SERPL-SCNC: 22 MMOL/L (ref 23–29)
CREAT SERPL-MCNC: 1.3 MG/DL (ref 0.5–1.4)
EAG (OHS): 163 MG/DL (ref 68–131)
ERYTHROCYTE [DISTWIDTH] IN BLOOD BY AUTOMATED COUNT: 13.3 % (ref 11.5–14.5)
GFR SERPLBLD CREATININE-BSD FMLA CKD-EPI: >60 ML/MIN/1.73/M2
GLUCOSE SERPL-MCNC: 144 MG/DL (ref 70–110)
HBA1C MFR BLD: 7.3 % (ref 4–5.6)
HCT VFR BLD AUTO: 37.6 % (ref 40–54)
HGB BLD-MCNC: 12.4 GM/DL (ref 14–18)
IMM GRANULOCYTES # BLD AUTO: 0.04 K/UL (ref 0–0.04)
IMM GRANULOCYTES NFR BLD AUTO: 0.6 % (ref 0–0.5)
LYMPHOCYTES # BLD AUTO: 2.21 K/UL (ref 1–4.8)
MCH RBC QN AUTO: 30.4 PG (ref 27–31)
MCHC RBC AUTO-ENTMCNC: 33 G/DL (ref 32–36)
MCV RBC AUTO: 92 FL (ref 82–98)
NUCLEATED RBC (/100WBC) (OHS): 0 /100 WBC
PLATELET # BLD AUTO: 220 K/UL (ref 150–450)
PMV BLD AUTO: 9.5 FL (ref 9.2–12.9)
POTASSIUM SERPL-SCNC: 4.1 MMOL/L (ref 3.5–5.1)
PROT SERPL-MCNC: 7.8 GM/DL (ref 6–8.4)
PSA SERPL-MCNC: 0.03 NG/ML
RBC # BLD AUTO: 4.08 M/UL (ref 4.6–6.2)
RELATIVE EOSINOPHIL (OHS): 5.8 %
RELATIVE LYMPHOCYTE (OHS): 33.1 % (ref 18–48)
RELATIVE MONOCYTE (OHS): 10.6 % (ref 4–15)
RELATIVE NEUTROPHIL (OHS): 49.2 % (ref 38–73)
SODIUM SERPL-SCNC: 139 MMOL/L (ref 136–145)
TESTOST SERPL-MCNC: 17 NG/DL (ref 304–1227)
WBC # BLD AUTO: 6.68 K/UL (ref 3.9–12.7)

## 2025-08-18 PROCEDURE — 96402 CHEMO HORMON ANTINEOPL SQ/IM: CPT

## 2025-08-18 PROCEDURE — 83036 HEMOGLOBIN GLYCOSYLATED A1C: CPT

## 2025-08-18 PROCEDURE — 1160F RVW MEDS BY RX/DR IN RCRD: CPT | Mod: CPTII,95,, | Performed by: INTERNAL MEDICINE

## 2025-08-18 PROCEDURE — 1157F ADVNC CARE PLAN IN RCRD: CPT | Mod: CPTII,95,, | Performed by: INTERNAL MEDICINE

## 2025-08-18 PROCEDURE — 4010F ACE/ARB THERAPY RXD/TAKEN: CPT | Mod: CPTII,95,, | Performed by: INTERNAL MEDICINE

## 2025-08-18 PROCEDURE — 1159F MED LIST DOCD IN RCRD: CPT | Mod: CPTII,95,, | Performed by: INTERNAL MEDICINE

## 2025-08-18 PROCEDURE — 84153 ASSAY OF PSA TOTAL: CPT

## 2025-08-18 PROCEDURE — 84132 ASSAY OF SERUM POTASSIUM: CPT

## 2025-08-18 PROCEDURE — 85025 COMPLETE CBC W/AUTO DIFF WBC: CPT

## 2025-08-18 PROCEDURE — 63600175 PHARM REV CODE 636 W HCPCS: Mod: JZ,TB | Performed by: INTERNAL MEDICINE

## 2025-08-18 PROCEDURE — 98007 SYNCH AUDIO-VIDEO EST HI 40: CPT | Mod: 95,,, | Performed by: INTERNAL MEDICINE

## 2025-08-18 PROCEDURE — 3051F HG A1C>EQUAL 7.0%<8.0%: CPT | Mod: CPTII,95,, | Performed by: INTERNAL MEDICINE

## 2025-08-18 PROCEDURE — 36415 COLL VENOUS BLD VENIPUNCTURE: CPT

## 2025-08-18 PROCEDURE — 84403 ASSAY OF TOTAL TESTOSTERONE: CPT

## 2025-08-18 RX ADMIN — LEUPROLIDE ACETATE 22.5 MG: 22.5 INJECTION, SUSPENSION, EXTENDED RELEASE SUBCUTANEOUS at 03:08

## (undated) DEVICE — SET IRR URLGY 2LINE UNIV SPIKE

## (undated) DEVICE — GLOVE BIOGEL SKINSENSE PI 7.0

## (undated) DEVICE — JELLY LUBRICANT STERILE 4 OZ

## (undated) DEVICE — CATH URTRL OPEN END STR TIP 5F

## (undated) DEVICE — SYR ONLY LUER LOCK 20CC

## (undated) DEVICE — BAG LINGEMAN DRAIN UROLOGY

## (undated) DEVICE — SCRUB DYNA-HEX LIQ 4% CHG 4OZ

## (undated) DEVICE — TOWEL OR DISP STRL BLUE 4/PK

## (undated) DEVICE — SYR 50ML CATH TIP

## (undated) DEVICE — SYR ONLY 60CC TOOMEY

## (undated) DEVICE — CATH URETERAL DUAL LUMEN 10FR

## (undated) DEVICE — EXTRACTOR STONE 4WR NIT 2.2F 1

## (undated) DEVICE — SOL IRR NACL .9% 3000ML

## (undated) DEVICE — Device

## (undated) DEVICE — GOWN POLY REINF X-LONG 2XL

## (undated) DEVICE — MORCELATOR BLADES

## (undated) DEVICE — GUIDEWIRE NITINOL HYBRID 150CM

## (undated) DEVICE — CATH POLLACK OPEN-END FLEXI-TI

## (undated) DEVICE — DRAPE T CYSTOSCOPY STERILE

## (undated) DEVICE — ELECTRODE RESECTSCP HI 24FR

## (undated) DEVICE — DRESSING TRANS 4X4 3/4

## (undated) DEVICE — URINARY DRAINAGE BAG

## (undated) DEVICE — GAUZE SPONGE 4X4 12PLY

## (undated) DEVICE — CONTAINER MULTIPURPOSE/SPECIME

## (undated) DEVICE — CONTAINER SPECIMEN STRL 4OZ

## (undated) DEVICE — LOOP CUTTING BPLR 24/26F .30MM

## (undated) DEVICE — GUIDE WIRE MOTION .035 X 150CM

## (undated) DEVICE — SHEATH URET FLEXOR 12FR 45CM

## (undated) DEVICE — BAG PRESSURE INFUSER 3000CC